# Patient Record
Sex: FEMALE | NOT HISPANIC OR LATINO | Employment: OTHER | URBAN - METROPOLITAN AREA
[De-identification: names, ages, dates, MRNs, and addresses within clinical notes are randomized per-mention and may not be internally consistent; named-entity substitution may affect disease eponyms.]

---

## 2017-01-09 ENCOUNTER — RECORDS - HEALTHEAST (OUTPATIENT)
Dept: BONE DENSITY | Facility: CLINIC | Age: 72
End: 2017-01-09

## 2017-01-09 ENCOUNTER — RECORDS - HEALTHEAST (OUTPATIENT)
Dept: ADMINISTRATIVE | Facility: OTHER | Age: 72
End: 2017-01-09

## 2017-01-09 DIAGNOSIS — Z85.3 PERSONAL HISTORY OF MALIGNANT NEOPLASM OF BREAST: ICD-10-CM

## 2017-01-09 DIAGNOSIS — M85.80 OTHER SPECIFIED DISORDERS OF BONE DENSITY AND STRUCTURE, UNSPECIFIED SITE: ICD-10-CM

## 2017-01-09 DIAGNOSIS — M89.9 DISORDER OF BONE, UNSPECIFIED: ICD-10-CM

## 2017-01-11 ENCOUNTER — RECORDS - HEALTHEAST (OUTPATIENT)
Dept: ADMINISTRATIVE | Facility: OTHER | Age: 72
End: 2017-01-11

## 2017-01-18 ENCOUNTER — COMMUNICATION - HEALTHEAST (OUTPATIENT)
Dept: FAMILY MEDICINE | Facility: CLINIC | Age: 72
End: 2017-01-18

## 2017-01-25 ENCOUNTER — OFFICE VISIT - HEALTHEAST (OUTPATIENT)
Dept: RHEUMATOLOGY | Facility: CLINIC | Age: 72
End: 2017-01-25

## 2017-01-25 DIAGNOSIS — M19.90 OSTEOARTHRITIS: ICD-10-CM

## 2017-01-25 DIAGNOSIS — M85.80 OSTEOPENIA: ICD-10-CM

## 2017-03-02 ENCOUNTER — OFFICE VISIT - HEALTHEAST (OUTPATIENT)
Dept: FAMILY MEDICINE | Facility: CLINIC | Age: 72
End: 2017-03-02

## 2017-03-02 ENCOUNTER — COMMUNICATION - HEALTHEAST (OUTPATIENT)
Dept: TELEHEALTH | Facility: CLINIC | Age: 72
End: 2017-03-02

## 2017-03-02 DIAGNOSIS — S83.207A ACUTE MENISCAL TEAR OF LEFT KNEE, INITIAL ENCOUNTER: ICD-10-CM

## 2017-03-02 DIAGNOSIS — E87.1 HYPONATREMIA: ICD-10-CM

## 2017-03-02 DIAGNOSIS — Z01.818 PRE-OPERATIVE EXAM: ICD-10-CM

## 2017-03-02 LAB
ATRIAL RATE - MUSE: 67 BPM
DIASTOLIC BLOOD PRESSURE - MUSE: NORMAL MMHG
INTERPRETATION ECG - MUSE: NORMAL
P AXIS - MUSE: 74 DEGREES
PR INTERVAL - MUSE: 144 MS
QRS DURATION - MUSE: 70 MS
QT - MUSE: 388 MS
QTC - MUSE: 409 MS
R AXIS - MUSE: 82 DEGREES
SYSTOLIC BLOOD PRESSURE - MUSE: NORMAL MMHG
T AXIS - MUSE: 73 DEGREES
VENTRICULAR RATE- MUSE: 67 BPM

## 2017-03-02 ASSESSMENT — MIFFLIN-ST. JEOR: SCORE: 1113.42

## 2017-03-03 ENCOUNTER — RECORDS - HEALTHEAST (OUTPATIENT)
Dept: ADMINISTRATIVE | Facility: OTHER | Age: 72
End: 2017-03-03

## 2017-03-07 ENCOUNTER — RECORDS - HEALTHEAST (OUTPATIENT)
Dept: ADMINISTRATIVE | Facility: OTHER | Age: 72
End: 2017-03-07

## 2017-03-09 ENCOUNTER — COMMUNICATION - HEALTHEAST (OUTPATIENT)
Dept: FAMILY MEDICINE | Facility: CLINIC | Age: 72
End: 2017-03-09

## 2017-04-03 ENCOUNTER — COMMUNICATION - HEALTHEAST (OUTPATIENT)
Dept: ONCOLOGY | Facility: CLINIC | Age: 72
End: 2017-04-03

## 2017-04-03 ENCOUNTER — OFFICE VISIT - HEALTHEAST (OUTPATIENT)
Dept: FAMILY MEDICINE | Facility: CLINIC | Age: 72
End: 2017-04-03

## 2017-04-03 DIAGNOSIS — R61 UNEXPLAINED NIGHT SWEATS: ICD-10-CM

## 2017-04-03 DIAGNOSIS — E87.1 HYPONATREMIA: ICD-10-CM

## 2017-05-18 ENCOUNTER — AMBULATORY - HEALTHEAST (OUTPATIENT)
Dept: LAB | Facility: CLINIC | Age: 72
End: 2017-05-18

## 2017-05-18 DIAGNOSIS — E87.1 HYPONATREMIA: ICD-10-CM

## 2017-05-23 ENCOUNTER — COMMUNICATION - HEALTHEAST (OUTPATIENT)
Dept: FAMILY MEDICINE | Facility: CLINIC | Age: 72
End: 2017-05-23

## 2017-05-30 ENCOUNTER — OFFICE VISIT - HEALTHEAST (OUTPATIENT)
Dept: FAMILY MEDICINE | Facility: CLINIC | Age: 72
End: 2017-05-30

## 2017-05-30 DIAGNOSIS — L50.9 FULL BODY HIVES: ICD-10-CM

## 2017-06-07 ENCOUNTER — OFFICE VISIT - HEALTHEAST (OUTPATIENT)
Dept: ONCOLOGY | Facility: CLINIC | Age: 72
End: 2017-06-07

## 2017-06-07 DIAGNOSIS — C50.919 BREAST CANCER (H): ICD-10-CM

## 2017-06-07 ASSESSMENT — MIFFLIN-ST. JEOR: SCORE: 1085.75

## 2017-06-08 LAB — BREAST CARCINOMA ASSOC AG(CA 27.29) - HISTORICAL: 34.1 U/ML

## 2017-06-09 ENCOUNTER — COMMUNICATION - HEALTHEAST (OUTPATIENT)
Dept: ONCOLOGY | Facility: CLINIC | Age: 72
End: 2017-06-09

## 2017-06-15 ENCOUNTER — COMMUNICATION - HEALTHEAST (OUTPATIENT)
Dept: ONCOLOGY | Facility: CLINIC | Age: 72
End: 2017-06-15

## 2017-10-12 ENCOUNTER — AMBULATORY - HEALTHEAST (OUTPATIENT)
Dept: NURSING | Facility: CLINIC | Age: 72
End: 2017-10-12

## 2017-10-12 ENCOUNTER — COMMUNICATION - HEALTHEAST (OUTPATIENT)
Dept: ONCOLOGY | Facility: CLINIC | Age: 72
End: 2017-10-12

## 2017-10-12 DIAGNOSIS — Z23 NEED FOR IMMUNIZATION AGAINST INFLUENZA: ICD-10-CM

## 2017-10-20 ENCOUNTER — RECORDS - HEALTHEAST (OUTPATIENT)
Dept: ADMINISTRATIVE | Facility: OTHER | Age: 72
End: 2017-10-20

## 2017-10-26 ENCOUNTER — OFFICE VISIT - HEALTHEAST (OUTPATIENT)
Dept: RHEUMATOLOGY | Facility: CLINIC | Age: 72
End: 2017-10-26

## 2017-10-26 DIAGNOSIS — M13.0 POLYARTHRITIS OF HAND: ICD-10-CM

## 2017-10-26 ASSESSMENT — MIFFLIN-ST. JEOR: SCORE: 1085.75

## 2017-11-20 ENCOUNTER — OFFICE VISIT - HEALTHEAST (OUTPATIENT)
Dept: FAMILY MEDICINE | Facility: CLINIC | Age: 72
End: 2017-11-20

## 2017-11-20 DIAGNOSIS — R30.0 DYSURIA: ICD-10-CM

## 2017-11-20 NOTE — ASSESSMENT & PLAN NOTE
Patient is describing dysuria and urgency to void.  She is status post hysterectomy approximately 20+ years ago.  She also had some type of bladder sling procedure.  Symptoms are new in the past3 weeks.  She had an expected postsurgical physical exam.  -Urinalysis today not showing infection   -Recommended attention to bowel function with judicious use of stool softening agents to achieve more regular bowel movements.  -If not improving with these measures, consider referral to gynecology to discuss urodynamic testing.

## 2018-03-16 ENCOUNTER — RECORDS - HEALTHEAST (OUTPATIENT)
Dept: ADMINISTRATIVE | Facility: OTHER | Age: 73
End: 2018-03-16

## 2018-04-09 ENCOUNTER — COMMUNICATION - HEALTHEAST (OUTPATIENT)
Dept: FAMILY MEDICINE | Facility: CLINIC | Age: 73
End: 2018-04-09

## 2018-04-09 DIAGNOSIS — M25.569 KNEE PAIN: ICD-10-CM

## 2018-05-09 ENCOUNTER — RECORDS - HEALTHEAST (OUTPATIENT)
Dept: ADMINISTRATIVE | Facility: OTHER | Age: 73
End: 2018-05-09

## 2018-06-15 ENCOUNTER — OFFICE VISIT - HEALTHEAST (OUTPATIENT)
Dept: FAMILY MEDICINE | Facility: CLINIC | Age: 73
End: 2018-06-15

## 2018-06-15 DIAGNOSIS — Z01.818 PRE-OPERATIVE EXAMINATION: ICD-10-CM

## 2018-06-15 DIAGNOSIS — M17.12 PRIMARY OSTEOARTHRITIS OF LEFT KNEE: ICD-10-CM

## 2018-06-15 DIAGNOSIS — Z12.11 SCREEN FOR COLON CANCER: ICD-10-CM

## 2018-06-15 LAB
ANION GAP SERPL CALCULATED.3IONS-SCNC: 12 MMOL/L (ref 5–18)
BUN SERPL-MCNC: 13 MG/DL (ref 8–28)
CALCIUM SERPL-MCNC: 9.8 MG/DL (ref 8.5–10.5)
CHLORIDE BLD-SCNC: 98 MMOL/L (ref 98–107)
CO2 SERPL-SCNC: 24 MMOL/L (ref 22–31)
CREAT SERPL-MCNC: 0.59 MG/DL (ref 0.6–1.1)
ERYTHROCYTE [DISTWIDTH] IN BLOOD BY AUTOMATED COUNT: 11.2 % (ref 11–14.5)
GFR SERPL CREATININE-BSD FRML MDRD: >60 ML/MIN/1.73M2
GLUCOSE BLD-MCNC: 86 MG/DL (ref 70–125)
HCT VFR BLD AUTO: 38.3 % (ref 35–47)
HGB BLD-MCNC: 13.2 G/DL (ref 12–16)
MCH RBC QN AUTO: 31.8 PG (ref 27–34)
MCHC RBC AUTO-ENTMCNC: 34.5 G/DL (ref 32–36)
MCV RBC AUTO: 92 FL (ref 80–100)
PLATELET # BLD AUTO: 187 THOU/UL (ref 140–440)
PMV BLD AUTO: 6.4 FL (ref 7–10)
POTASSIUM BLD-SCNC: 4.3 MMOL/L (ref 3.5–5)
RBC # BLD AUTO: 4.15 MILL/UL (ref 3.8–5.4)
SODIUM SERPL-SCNC: 134 MMOL/L (ref 136–145)
WBC: 5 THOU/UL (ref 4–11)

## 2018-06-15 ASSESSMENT — MIFFLIN-ST. JEOR: SCORE: 1078.95

## 2018-06-15 NOTE — ASSESSMENT & PLAN NOTE
Reviewed the documentation/thought process leading up to the decision to pursue total left knee arthroplasty.  I discussed this with the patient and she is clear and her desire to have the surgery in order to improve the quality of her life and her ability to walk.  There is no contra indication to surgery.  We discussed that she will be anticoagulated per Dr. Soria' recommendations in his note.  She is comfortable with these recommendations.  -Check laboratory tests.  History of hyponatremia which is been stable and previously worked up.  Anticipate low normal sodium or slightly decreased sodium.  Check hemoglobin.  -The patient would like to continue follow-up with rheumatology.  We discussed follow-up plans.

## 2018-06-18 ENCOUNTER — COMMUNICATION - HEALTHEAST (OUTPATIENT)
Dept: FAMILY MEDICINE | Facility: CLINIC | Age: 73
End: 2018-06-18

## 2018-06-21 LAB
ATRIAL RATE - MUSE: 67 BPM
DIASTOLIC BLOOD PRESSURE - MUSE: NORMAL MMHG
INTERPRETATION ECG - MUSE: NORMAL
P AXIS - MUSE: 62 DEGREES
PR INTERVAL - MUSE: 146 MS
QRS DURATION - MUSE: 64 MS
QT - MUSE: 396 MS
QTC - MUSE: 418 MS
R AXIS - MUSE: 76 DEGREES
SYSTOLIC BLOOD PRESSURE - MUSE: NORMAL MMHG
T AXIS - MUSE: 77 DEGREES
VENTRICULAR RATE- MUSE: 67 BPM

## 2018-07-05 ENCOUNTER — RECORDS - HEALTHEAST (OUTPATIENT)
Dept: ADMINISTRATIVE | Facility: OTHER | Age: 73
End: 2018-07-05

## 2018-07-09 ENCOUNTER — RECORDS - HEALTHEAST (OUTPATIENT)
Dept: ADMINISTRATIVE | Facility: OTHER | Age: 73
End: 2018-07-09

## 2018-07-11 ENCOUNTER — RECORDS - HEALTHEAST (OUTPATIENT)
Dept: LAB | Facility: CLINIC | Age: 73
End: 2018-07-11

## 2018-07-11 ENCOUNTER — RECORDS - HEALTHEAST (OUTPATIENT)
Dept: ADMINISTRATIVE | Facility: OTHER | Age: 73
End: 2018-07-11

## 2018-07-11 LAB
ALP SERPL-CCNC: 104 U/L (ref 45–120)
CALCIUM SERPL-MCNC: 9.7 MG/DL (ref 8.5–10.5)
PTH-INTACT SERPL-MCNC: 35 PG/ML (ref 10–86)

## 2018-07-12 LAB — 25(OH)D3 SERPL-MCNC: 40.1 NG/ML (ref 30–80)

## 2018-07-24 ENCOUNTER — OFFICE VISIT - HEALTHEAST (OUTPATIENT)
Dept: ONCOLOGY | Facility: CLINIC | Age: 73
End: 2018-07-24

## 2018-07-24 ENCOUNTER — AMBULATORY - HEALTHEAST (OUTPATIENT)
Dept: INFUSION THERAPY | Facility: CLINIC | Age: 73
End: 2018-07-24

## 2018-07-24 DIAGNOSIS — C50.919 BREAST CANCER (H): ICD-10-CM

## 2018-07-24 LAB
ALBUMIN SERPL-MCNC: 3.8 G/DL (ref 3.5–5)
ALP SERPL-CCNC: 101 U/L (ref 45–120)
ALT SERPL W P-5'-P-CCNC: 82 U/L (ref 0–45)
ANION GAP SERPL CALCULATED.3IONS-SCNC: 9 MMOL/L (ref 5–18)
AST SERPL W P-5'-P-CCNC: 51 U/L (ref 0–40)
BASOPHILS # BLD AUTO: 0 THOU/UL (ref 0–0.2)
BASOPHILS NFR BLD AUTO: 1 % (ref 0–2)
BILIRUB SERPL-MCNC: 0.3 MG/DL (ref 0–1)
BUN SERPL-MCNC: 12 MG/DL (ref 8–28)
CALCIUM SERPL-MCNC: 9.5 MG/DL (ref 8.5–10.5)
CHLORIDE BLD-SCNC: 96 MMOL/L (ref 98–107)
CO2 SERPL-SCNC: 26 MMOL/L (ref 22–31)
CREAT SERPL-MCNC: 0.61 MG/DL (ref 0.6–1.1)
EOSINOPHIL # BLD AUTO: 0.3 THOU/UL (ref 0–0.4)
EOSINOPHIL NFR BLD AUTO: 6 % (ref 0–6)
ERYTHROCYTE [DISTWIDTH] IN BLOOD BY AUTOMATED COUNT: 12.8 % (ref 11–14.5)
GFR SERPL CREATININE-BSD FRML MDRD: >60 ML/MIN/1.73M2
GLUCOSE BLD-MCNC: 125 MG/DL (ref 70–125)
HCT VFR BLD AUTO: 31.2 % (ref 35–47)
HGB BLD-MCNC: 11.1 G/DL (ref 12–16)
LYMPHOCYTES # BLD AUTO: 1 THOU/UL (ref 0.8–4.4)
LYMPHOCYTES NFR BLD AUTO: 20 % (ref 20–40)
MCH RBC QN AUTO: 30.8 PG (ref 27–34)
MCHC RBC AUTO-ENTMCNC: 35.6 G/DL (ref 32–36)
MCV RBC AUTO: 87 FL (ref 80–100)
MONOCYTES # BLD AUTO: 0.5 THOU/UL (ref 0–0.9)
MONOCYTES NFR BLD AUTO: 10 % (ref 2–10)
NEUTROPHILS # BLD AUTO: 3.1 THOU/UL (ref 2–7.7)
NEUTROPHILS NFR BLD AUTO: 64 % (ref 50–70)
PLATELET # BLD AUTO: 224 THOU/UL (ref 140–440)
PMV BLD AUTO: 8.7 FL (ref 8.5–12.5)
POTASSIUM BLD-SCNC: 4.1 MMOL/L (ref 3.5–5)
PROT SERPL-MCNC: 7.3 G/DL (ref 6–8)
RBC # BLD AUTO: 3.6 MILL/UL (ref 3.8–5.4)
SODIUM SERPL-SCNC: 131 MMOL/L (ref 136–145)
WBC: 4.9 THOU/UL (ref 4–11)

## 2018-07-25 LAB — CANCER AG27-29 SERPL-ACNC: 49 U/ML (ref 0–39)

## 2018-07-26 ENCOUNTER — AMBULATORY - HEALTHEAST (OUTPATIENT)
Dept: ONCOLOGY | Facility: CLINIC | Age: 73
End: 2018-07-26

## 2018-07-26 DIAGNOSIS — Z85.3 HISTORY OF BREAST CANCER: ICD-10-CM

## 2018-08-03 ENCOUNTER — COMMUNICATION - HEALTHEAST (OUTPATIENT)
Dept: ONCOLOGY | Facility: CLINIC | Age: 73
End: 2018-08-03

## 2018-08-03 ENCOUNTER — AMBULATORY - HEALTHEAST (OUTPATIENT)
Dept: ONCOLOGY | Facility: CLINIC | Age: 73
End: 2018-08-03

## 2018-08-08 ENCOUNTER — HOSPITAL ENCOUNTER (OUTPATIENT)
Dept: CT IMAGING | Facility: CLINIC | Age: 73
Discharge: HOME | End: 2018-08-08
Attending: INTERNAL MEDICINE

## 2018-08-08 DIAGNOSIS — Z85.3 HISTORY OF BREAST CANCER: ICD-10-CM

## 2018-08-09 ENCOUNTER — OFFICE VISIT - HEALTHEAST (OUTPATIENT)
Dept: ONCOLOGY | Facility: CLINIC | Age: 73
End: 2018-08-09

## 2018-08-09 DIAGNOSIS — R59.1 LYMPHADENOPATHY: ICD-10-CM

## 2018-08-09 DIAGNOSIS — Z85.3 HISTORY OF BREAST CANCER: ICD-10-CM

## 2018-08-09 DIAGNOSIS — R79.89 ELEVATED LFTS: ICD-10-CM

## 2018-08-09 ASSESSMENT — MIFFLIN-ST. JEOR: SCORE: 1080.76

## 2018-08-13 ENCOUNTER — RECORDS - HEALTHEAST (OUTPATIENT)
Dept: ADMINISTRATIVE | Facility: OTHER | Age: 73
End: 2018-08-13

## 2018-09-04 ENCOUNTER — COMMUNICATION - HEALTHEAST (OUTPATIENT)
Dept: FAMILY MEDICINE | Facility: CLINIC | Age: 73
End: 2018-09-04

## 2018-09-10 ENCOUNTER — RECORDS - HEALTHEAST (OUTPATIENT)
Dept: ADMINISTRATIVE | Facility: OTHER | Age: 73
End: 2018-09-10

## 2018-09-11 ENCOUNTER — RECORDS - HEALTHEAST (OUTPATIENT)
Dept: ADMINISTRATIVE | Facility: OTHER | Age: 73
End: 2018-09-11

## 2018-09-17 ENCOUNTER — RECORDS - HEALTHEAST (OUTPATIENT)
Dept: ADMINISTRATIVE | Facility: OTHER | Age: 73
End: 2018-09-17

## 2019-02-06 ENCOUNTER — OFFICE VISIT - HEALTHEAST (OUTPATIENT)
Dept: ONCOLOGY | Facility: CLINIC | Age: 74
End: 2019-02-06

## 2019-02-06 ENCOUNTER — AMBULATORY - HEALTHEAST (OUTPATIENT)
Dept: INFUSION THERAPY | Facility: CLINIC | Age: 74
End: 2019-02-06

## 2019-02-06 DIAGNOSIS — R59.1 LYMPHADENOPATHY: ICD-10-CM

## 2019-02-06 DIAGNOSIS — Z85.3 HISTORY OF BREAST CANCER: ICD-10-CM

## 2019-02-06 LAB
ALBUMIN SERPL-MCNC: 4.2 G/DL (ref 3.5–5)
ALP SERPL-CCNC: 67 U/L (ref 45–120)
ALT SERPL W P-5'-P-CCNC: 33 U/L (ref 0–45)
ANION GAP SERPL CALCULATED.3IONS-SCNC: 13 MMOL/L (ref 5–18)
AST SERPL W P-5'-P-CCNC: 35 U/L (ref 0–40)
BASOPHILS # BLD AUTO: 0 THOU/UL (ref 0–0.2)
BASOPHILS NFR BLD AUTO: 0 % (ref 0–2)
BILIRUB SERPL-MCNC: 0.6 MG/DL (ref 0–1)
BUN SERPL-MCNC: 14 MG/DL (ref 8–28)
CALCIUM SERPL-MCNC: 9.4 MG/DL (ref 8.5–10.5)
CHLORIDE BLD-SCNC: 99 MMOL/L (ref 98–107)
CO2 SERPL-SCNC: 23 MMOL/L (ref 22–31)
CREAT SERPL-MCNC: 0.65 MG/DL (ref 0.6–1.1)
EOSINOPHIL # BLD AUTO: 0 THOU/UL (ref 0–0.4)
EOSINOPHIL NFR BLD AUTO: 0 % (ref 0–6)
ERYTHROCYTE [DISTWIDTH] IN BLOOD BY AUTOMATED COUNT: 11.8 % (ref 11–14.5)
GFR SERPL CREATININE-BSD FRML MDRD: >60 ML/MIN/1.73M2
GLUCOSE BLD-MCNC: 114 MG/DL (ref 70–125)
HCT VFR BLD AUTO: 34.2 % (ref 35–47)
HGB BLD-MCNC: 12.4 G/DL (ref 12–16)
LDH SERPL L TO P-CCNC: 273 U/L (ref 125–220)
LYMPHOCYTES # BLD AUTO: 0.8 THOU/UL (ref 0.8–4.4)
LYMPHOCYTES NFR BLD AUTO: 14 % (ref 20–40)
MCH RBC QN AUTO: 31.2 PG (ref 27–34)
MCHC RBC AUTO-ENTMCNC: 36.3 G/DL (ref 32–36)
MCV RBC AUTO: 86 FL (ref 80–100)
MONOCYTES # BLD AUTO: 0.5 THOU/UL (ref 0–0.9)
MONOCYTES NFR BLD AUTO: 10 % (ref 2–10)
NEUTROPHILS # BLD AUTO: 4.1 THOU/UL (ref 2–7.7)
NEUTROPHILS NFR BLD AUTO: 75 % (ref 50–70)
PLATELET # BLD AUTO: 172 THOU/UL (ref 140–440)
PMV BLD AUTO: 8.8 FL (ref 8.5–12.5)
POTASSIUM BLD-SCNC: 3.9 MMOL/L (ref 3.5–5)
PROT SERPL-MCNC: 7.5 G/DL (ref 6–8)
RBC # BLD AUTO: 3.97 MILL/UL (ref 3.8–5.4)
SODIUM SERPL-SCNC: 135 MMOL/L (ref 136–145)
WBC: 5.4 THOU/UL (ref 4–11)

## 2019-02-07 LAB — CANCER AG27-29 SERPL-ACNC: 31 U/ML (ref 0–39)

## 2019-02-13 ENCOUNTER — RECORDS - HEALTHEAST (OUTPATIENT)
Dept: ADMINISTRATIVE | Facility: OTHER | Age: 74
End: 2019-02-13

## 2019-06-19 ENCOUNTER — RECORDS - HEALTHEAST (OUTPATIENT)
Dept: ADMINISTRATIVE | Facility: OTHER | Age: 74
End: 2019-06-19

## 2019-10-16 ENCOUNTER — OFFICE VISIT - HEALTHEAST (OUTPATIENT)
Dept: ONCOLOGY | Facility: CLINIC | Age: 74
End: 2019-10-16

## 2019-10-16 DIAGNOSIS — R59.1 LYMPHADENOPATHY: ICD-10-CM

## 2019-10-16 DIAGNOSIS — Z85.3 HISTORY OF BREAST CANCER: ICD-10-CM

## 2019-10-16 DIAGNOSIS — C50.812 MALIGNANT NEOPLASM OF OVERLAPPING SITES OF LEFT BREAST IN FEMALE, ESTROGEN RECEPTOR POSITIVE (H): ICD-10-CM

## 2019-10-16 DIAGNOSIS — Z17.0 MALIGNANT NEOPLASM OF OVERLAPPING SITES OF LEFT BREAST IN FEMALE, ESTROGEN RECEPTOR POSITIVE (H): ICD-10-CM

## 2020-07-21 ENCOUNTER — COMMUNICATION - HEALTHEAST (OUTPATIENT)
Dept: ADMINISTRATIVE | Facility: HOSPITAL | Age: 75
End: 2020-07-21

## 2020-08-06 ENCOUNTER — COMMUNICATION - HEALTHEAST (OUTPATIENT)
Dept: ADMINISTRATIVE | Facility: HOSPITAL | Age: 75
End: 2020-08-06

## 2020-08-27 ENCOUNTER — RECORDS - HEALTHEAST (OUTPATIENT)
Dept: ADMINISTRATIVE | Facility: OTHER | Age: 75
End: 2020-08-27

## 2020-10-22 ENCOUNTER — OFFICE VISIT - HEALTHEAST (OUTPATIENT)
Dept: ONCOLOGY | Facility: CLINIC | Age: 75
End: 2020-10-22

## 2020-10-22 DIAGNOSIS — C50.812 MALIGNANT NEOPLASM OF OVERLAPPING SITES OF LEFT BREAST IN FEMALE, ESTROGEN RECEPTOR POSITIVE (H): ICD-10-CM

## 2020-10-22 DIAGNOSIS — Z17.0 MALIGNANT NEOPLASM OF OVERLAPPING SITES OF LEFT BREAST IN FEMALE, ESTROGEN RECEPTOR POSITIVE (H): ICD-10-CM

## 2020-10-29 ENCOUNTER — OFFICE VISIT - HEALTHEAST (OUTPATIENT)
Dept: FAMILY MEDICINE | Facility: CLINIC | Age: 75
End: 2020-10-29

## 2020-10-29 DIAGNOSIS — Z00.00 ROUTINE GENERAL MEDICAL EXAMINATION AT A HEALTH CARE FACILITY: ICD-10-CM

## 2020-10-29 DIAGNOSIS — I10 BENIGN ESSENTIAL HYPERTENSION: ICD-10-CM

## 2020-10-29 DIAGNOSIS — R73.01 ELEVATED FASTING GLUCOSE: ICD-10-CM

## 2020-10-29 DIAGNOSIS — Z85.3 HISTORY OF BREAST CANCER: ICD-10-CM

## 2020-10-29 DIAGNOSIS — E78.00 PURE HYPERCHOLESTEROLEMIA: ICD-10-CM

## 2020-10-29 DIAGNOSIS — M85.80 OSTEOPENIA, UNSPECIFIED LOCATION: ICD-10-CM

## 2020-10-29 LAB
ALT SERPL W P-5'-P-CCNC: 46 U/L (ref 0–45)
ANION GAP SERPL CALCULATED.3IONS-SCNC: 12 MMOL/L (ref 5–18)
BUN SERPL-MCNC: 15 MG/DL (ref 8–28)
CALCIUM SERPL-MCNC: 9.6 MG/DL (ref 8.5–10.5)
CHLORIDE BLD-SCNC: 99 MMOL/L (ref 98–107)
CO2 SERPL-SCNC: 24 MMOL/L (ref 22–31)
CREAT SERPL-MCNC: 0.66 MG/DL (ref 0.6–1.1)
ERYTHROCYTE [DISTWIDTH] IN BLOOD BY AUTOMATED COUNT: 11.8 % (ref 11–14.5)
GFR SERPL CREATININE-BSD FRML MDRD: >60 ML/MIN/1.73M2
GLUCOSE BLD-MCNC: 95 MG/DL (ref 70–125)
HCT VFR BLD AUTO: 41.4 % (ref 35–47)
HGB BLD-MCNC: 14.1 G/DL (ref 12–16)
MCH RBC QN AUTO: 32.3 PG (ref 27–34)
MCHC RBC AUTO-ENTMCNC: 34.1 G/DL (ref 32–36)
MCV RBC AUTO: 95 FL (ref 80–100)
PLATELET # BLD AUTO: 194 THOU/UL (ref 140–440)
PMV BLD AUTO: 6.5 FL (ref 7–10)
POTASSIUM BLD-SCNC: 4 MMOL/L (ref 3.5–5)
RBC # BLD AUTO: 4.37 MILL/UL (ref 3.8–5.4)
SODIUM SERPL-SCNC: 135 MMOL/L (ref 136–145)
WBC: 4.8 THOU/UL (ref 4–11)

## 2020-10-29 ASSESSMENT — MIFFLIN-ST. JEOR: SCORE: 1123.18

## 2020-10-29 NOTE — ASSESSMENT & PLAN NOTE
Patient presents for annual exam.  She says that much of her emotional energy over the past year has been focused on supporting her  who is undergoing Parkinson's disease and has experienced a decline of overall function.  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  She recently saw her oncologist who believes that she is unlikely to have recurrence of breast cancer.  Blood pressure concerns as discussed elsewhere.  She takes a number of supplements.  We will discontinue checking cholesterol as the patient states a strong preferenceto not to be on a cholesterol-lowering medication.  Osteopenia as discussed elsewhere.

## 2020-10-29 NOTE — ASSESSMENT & PLAN NOTE
Declined treatment for osteopenia last year.  Recheck DEXA in 1-2 years given abnormal and worsening result.

## 2020-10-29 NOTE — ASSESSMENT & PLAN NOTE
This patient has had persistent elevation of blood pressure across clinic visits (2 different health system) over the past couple of years.  She does meet criteria for essential hypertension.  We discussed that treating high blood pressure is in her interest given her family history of stroke in her mother.  She did agree to a prescription of lisinopril.  10 mg was prescribed.  She will check her blood pressure at home frequently and report back whether or not her body is responding.  She was resistant to my suggestion that she return to clinic for recheck.  However,given the severity of elevation I think it is reasonable start this medication.  Consider BMP recheck.

## 2020-10-30 ENCOUNTER — COMMUNICATION - HEALTHEAST (OUTPATIENT)
Dept: FAMILY MEDICINE | Facility: CLINIC | Age: 75
End: 2020-10-30

## 2020-10-30 LAB — HBA1C MFR BLD: 5.1 %

## 2020-12-03 ENCOUNTER — AMBULATORY - HEALTHEAST (OUTPATIENT)
Dept: FAMILY MEDICINE | Facility: CLINIC | Age: 75
End: 2020-12-03

## 2020-12-03 DIAGNOSIS — I10 BENIGN ESSENTIAL HYPERTENSION: ICD-10-CM

## 2020-12-03 DIAGNOSIS — L98.9 SKIN LESION: ICD-10-CM

## 2020-12-03 LAB
ANION GAP SERPL CALCULATED.3IONS-SCNC: 13 MMOL/L (ref 5–18)
BUN SERPL-MCNC: 14 MG/DL (ref 8–28)
CALCIUM SERPL-MCNC: 9.3 MG/DL (ref 8.5–10.5)
CHLORIDE BLD-SCNC: 95 MMOL/L (ref 98–107)
CO2 SERPL-SCNC: 24 MMOL/L (ref 22–31)
CREAT SERPL-MCNC: 0.73 MG/DL (ref 0.6–1.1)
GFR SERPL CREATININE-BSD FRML MDRD: >60 ML/MIN/1.73M2
GLUCOSE BLD-MCNC: 123 MG/DL (ref 70–125)
POTASSIUM BLD-SCNC: 3.8 MMOL/L (ref 3.5–5)
SODIUM SERPL-SCNC: 132 MMOL/L (ref 136–145)

## 2020-12-03 NOTE — ASSESSMENT & PLAN NOTE
Blood pressure improved with today's measurement.  No side effects from lisinopril.  Check basic metabolic panel.  Recheck in 6 monthsrecommended.

## 2020-12-07 LAB
LAB AP CHARGES (HE HISTORICAL CONVERSION): NORMAL
PATH REPORT.COMMENTS IMP SPEC: NORMAL
PATH REPORT.FINAL DX SPEC: NORMAL
PATH REPORT.GROSS SPEC: NORMAL
PATH REPORT.MICROSCOPIC SPEC OTHER STN: NORMAL
PATH REPORT.RELEVANT HX SPEC: NORMAL
RESULT FLAG (HE HISTORICAL CONVERSION): NORMAL

## 2020-12-08 ENCOUNTER — COMMUNICATION - HEALTHEAST (OUTPATIENT)
Dept: FAMILY MEDICINE | Facility: CLINIC | Age: 75
End: 2020-12-08

## 2020-12-08 DIAGNOSIS — E87.1 HYPONATREMIA: ICD-10-CM

## 2021-02-05 ENCOUNTER — RECORDS - HEALTHEAST (OUTPATIENT)
Dept: ADMINISTRATIVE | Facility: OTHER | Age: 76
End: 2021-02-05

## 2021-03-05 ENCOUNTER — RECORDS - HEALTHEAST (OUTPATIENT)
Dept: ADMINISTRATIVE | Facility: OTHER | Age: 76
End: 2021-03-05

## 2021-03-09 ENCOUNTER — OFFICE VISIT - HEALTHEAST (OUTPATIENT)
Dept: FAMILY MEDICINE | Facility: CLINIC | Age: 76
End: 2021-03-09

## 2021-03-09 DIAGNOSIS — I10 BENIGN ESSENTIAL HYPERTENSION: ICD-10-CM

## 2021-03-09 NOTE — ASSESSMENT & PLAN NOTE
This patient's blood pressure measurements over the past month have been elevated.  She is a history of hyponatremia of unclear etiology.  She is tolerated lisinopril well.  No obvious secondary cause of worsening hypertension.  We will simply titrate lisinopril from 10 mg to 20 mg/day.  Check basic metabolic panel.  Nurse visit will be scheduled in a couple of weeks for blood pressure measurement.  I will see her back likely in 1 month when she comes in for a preoperative exam (cataracts).

## 2021-05-13 ENCOUNTER — OFFICE VISIT - HEALTHEAST (OUTPATIENT)
Dept: FAMILY MEDICINE | Facility: CLINIC | Age: 76
End: 2021-05-13

## 2021-05-13 DIAGNOSIS — I10 BENIGN ESSENTIAL HYPERTENSION: ICD-10-CM

## 2021-05-13 DIAGNOSIS — Z01.818 PREOP GENERAL PHYSICAL EXAM: ICD-10-CM

## 2021-05-13 DIAGNOSIS — H26.9 CATARACT OF BOTH EYES, UNSPECIFIED CATARACT TYPE: ICD-10-CM

## 2021-05-13 DIAGNOSIS — C50.812 MALIGNANT NEOPLASM OF OVERLAPPING SITES OF LEFT BREAST IN FEMALE, ESTROGEN RECEPTOR POSITIVE (H): ICD-10-CM

## 2021-05-13 DIAGNOSIS — Z17.0 MALIGNANT NEOPLASM OF OVERLAPPING SITES OF LEFT BREAST IN FEMALE, ESTROGEN RECEPTOR POSITIVE (H): ICD-10-CM

## 2021-05-13 DIAGNOSIS — Z01.818 PRE-OP EXAM: ICD-10-CM

## 2021-05-13 LAB
ANION GAP SERPL CALCULATED.3IONS-SCNC: 10 MMOL/L (ref 5–18)
BUN SERPL-MCNC: 18 MG/DL (ref 8–28)
CALCIUM SERPL-MCNC: 9.3 MG/DL (ref 8.5–10.5)
CHLORIDE BLD-SCNC: 96 MMOL/L (ref 98–107)
CO2 SERPL-SCNC: 27 MMOL/L (ref 22–31)
CREAT SERPL-MCNC: 0.7 MG/DL (ref 0.6–1.1)
GFR SERPL CREATININE-BSD FRML MDRD: >60 ML/MIN/1.73M2
GLUCOSE BLD-MCNC: 104 MG/DL (ref 70–125)
POTASSIUM BLD-SCNC: 5 MMOL/L (ref 3.5–5)
SODIUM SERPL-SCNC: 133 MMOL/L (ref 136–145)

## 2021-05-13 ASSESSMENT — MIFFLIN-ST. JEOR: SCORE: 1110.7

## 2021-05-13 NOTE — ASSESSMENT & PLAN NOTE
No contraindication to surgery.  Able to perform >4 metabolic equivalents.  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

## 2021-05-14 ENCOUNTER — COMMUNICATION - HEALTHEAST (OUTPATIENT)
Dept: FAMILY MEDICINE | Facility: CLINIC | Age: 76
End: 2021-05-14

## 2021-05-14 DIAGNOSIS — I10 BENIGN ESSENTIAL HYPERTENSION: ICD-10-CM

## 2021-05-14 NOTE — ASSESSMENT & PLAN NOTE
Potassium is elevated.  The patient's blood pressure did come down after she was seated and rested for about 1/2-hour.  Given risks of hyperkalemia, I think it is worth while at this time to maintain her current dose of 20 mg.  Prescription was adjusted at the pharmacy.  We will need to contact the patient and let her know of this adjustment from our discussion yesterday.

## 2021-05-27 VITALS
WEIGHT: 143 LBS | TEMPERATURE: 98 F | BODY MASS INDEX: 24.41 KG/M2 | DIASTOLIC BLOOD PRESSURE: 62 MMHG | SYSTOLIC BLOOD PRESSURE: 138 MMHG | HEART RATE: 72 BPM | OXYGEN SATURATION: 97 % | HEIGHT: 64 IN

## 2021-05-30 VITALS — WEIGHT: 134.7 LBS | BODY MASS INDEX: 22.07 KG/M2

## 2021-05-30 VITALS — WEIGHT: 136.6 LBS | BODY MASS INDEX: 21.95 KG/M2 | HEIGHT: 66 IN

## 2021-05-30 VITALS — WEIGHT: 128 LBS | BODY MASS INDEX: 21.63 KG/M2

## 2021-05-31 VITALS — BODY MASS INDEX: 20.97 KG/M2 | WEIGHT: 130.5 LBS | HEIGHT: 66 IN

## 2021-05-31 VITALS — WEIGHT: 130.5 LBS | HEIGHT: 66 IN | BODY MASS INDEX: 20.97 KG/M2

## 2021-05-31 VITALS — WEIGHT: 133.4 LBS | BODY MASS INDEX: 21.86 KG/M2

## 2021-05-31 VITALS — BODY MASS INDEX: 20.81 KG/M2 | WEIGHT: 127 LBS

## 2021-06-01 VITALS — WEIGHT: 129.4 LBS | HEIGHT: 66 IN | BODY MASS INDEX: 20.79 KG/M2

## 2021-06-01 VITALS — WEIGHT: 130 LBS | BODY MASS INDEX: 21.3 KG/M2

## 2021-06-01 VITALS — WEIGHT: 129 LBS | HEIGHT: 66 IN | BODY MASS INDEX: 20.73 KG/M2

## 2021-06-02 VITALS — WEIGHT: 131.9 LBS | BODY MASS INDEX: 21.62 KG/M2

## 2021-06-02 NOTE — PROGRESS NOTES
[facility] Hematology and Oncology Progress Note    Patient: Sharon L Schwab  MRN: [id number]  Date of Service:10/16/2019      Reason for Visit    Chief Complaint   Patient presents with     HE Cancer     History of breast cancer       Assessment and Plan  Cancer Staging  No matching staging information was found for the patient.    ECOG Performance   ECOG Performance Status: 0     Distress Assessment  Distress Assessment Score: No distress    Pain  Currently in Pain: No/denies    #.  Several upper normal size lymphadenopathy above and below the diaphragm.  Incidentally found in August 2018.   Possibly reactive or low-grade lymphoproliferative neoplasm.  Currently asymptomatic.     Patient would like to hold off any further evaluation or repeat imaging at this point and I agree with that.    Continue clinical exam once a year and sooner with any concern.     #.  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  #.  Left breast invasive lobular carcinoma, stage I, ER positive in 2002.  Post left breast mastectomy and axillary lymph node dissection.  No adjuvant radiation or chemotherapy.  Completed 5 years of tamoxifen.  #.  Low bone density      Clinically well without evidence of breast cancer recurrence.  Examination was unremarkable.  No need for mammogram or imaging at this point.  Continue clinical exam once a year.  Call me sooner with any concern.      #.  Chronic arthritis  . She is using CBD while and which has been helping her a lot.    Problem List    1. Lymphadenopathy     2. History of breast cancer        ______________________________________________________________________________  Diagnosis  1989- diagnosed with DCIS in the right breast by screening mammogram.  2002-left breast invasive lobular carcinoma, early stage.  No lymph no involvement.    Treatment to date  1989-right radical mastectomy and axillary lymph node dissection.  2002-left breast radical  mastectomy and axillary lymph node dissection.  No adjuvant chemotherapy or radiation.  Completed 5 years of tamoxifen.  2008-hysterectomy and bilateral oophorectomy.    History of Present Illness    Eva present herself today for ongoing follow-up.  She is doing well overall.  No new concern.  She has ongoing arthritis, but she found CBD while has been really helpful for her.  Denies any fever, drenching night sweats.  No other lumps or bumps.  Appetite is good.  No new bone pain.     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)  Hot flashes/Night Sweats: Concerns  Neurosensory  Neurosensory (WDL): All neurosensory elements are within defined limits  Eye   Eye Disorder (WDL): All eye disorder elements are within defined limits  Ear  Ear Disorder (WDL): All ear disorder elements are within defined limits  Cardiovascular  Cardiovascular (WDL): All cardiovascular elements are within defined limits  Pulmonary  Respiratory (WDL): Within Defined Limits  Gastrointestinal  Gastrointestinal (WDL): Exceptions to WDL  Constipation: Concerns(prune juice prn)  Genitourinary  Genitourinary (WDL): All genitourinary elements are within defined limits  Lymphatic  Lymph (WDL): All lymph disorder elements are within defined limits  Musculoskeletal and Connective Tissue  Musculoskeletal and Connetive Tissue Disorders (WDL): Exceptions to WDL  Arthralgia: Concerns(bilateral hands and knees)  Integumentary  Integumentary (WDL): All integumentary elements are within defined limits  Patient Coping  Patient Coping: Accepting  Accompanied by  Accompanied by: Alone  Oral Chemo Adherence       Past History  Past Medical History:   Diagnosis Date     Breast CA (H)        Physical Exam    Recent Vitals 10/16/2019   Height -   Weight 131 lbs 6 oz   BSA (m2) 1.66 m2   /85   Pulse 70   Temp 98.1   Temp src 1   SpO2  99   Some recent data might be hidden     General: alert, awake, not in acute distress  HEENT: Head: Normal, normocephalic, atraumatic.  Eye: Normal external eye, conjunctiva, lids cornea, MYRON.  Ears:  Non-tender.  Nose: Normal external nose, mucus membranes and septum.  Pharynx: Dental Hygiene adequate. Normal buccal mucosa. Normal pharynx.  Neck / Thyroid: Supple, no masses, nodes, nodules or enlargement.  Lymphatics: No abnormally enlarged lymph nodes.  Chest: Normal chest wall and respirations. Clear to auscultation.  Breasts: Bilateral mastectomy with post bilateral implants.  No palpable skin nodules or abnormalities seen.  Heart: S1 S2 RRR, no murmur.   Abdomen: abdomen is soft without significant tenderness, masses, organomegaly or guarding  Extremities: normal strength, tone, and muscle mass  Skin: normal. no rash or abnormalities  CNS: non focal.      Lab Results    No results found for this or any previous visit (from the past 168 hour(s)).    Imaging    No results found.    Signed by: August Arvizu MD

## 2021-06-03 VITALS
WEIGHT: 131.4 LBS | HEART RATE: 70 BPM | SYSTOLIC BLOOD PRESSURE: 183 MMHG | DIASTOLIC BLOOD PRESSURE: 85 MMHG | BODY MASS INDEX: 21.53 KG/M2 | TEMPERATURE: 98.1 F | OXYGEN SATURATION: 99 %

## 2021-06-05 VITALS
WEIGHT: 144 LBS | OXYGEN SATURATION: 99 % | DIASTOLIC BLOOD PRESSURE: 80 MMHG | RESPIRATION RATE: 16 BRPM | BODY MASS INDEX: 24.72 KG/M2 | TEMPERATURE: 98.2 F | HEART RATE: 68 BPM | SYSTOLIC BLOOD PRESSURE: 152 MMHG

## 2021-06-05 VITALS
RESPIRATION RATE: 16 BRPM | HEART RATE: 80 BPM | BODY MASS INDEX: 24.37 KG/M2 | DIASTOLIC BLOOD PRESSURE: 74 MMHG | SYSTOLIC BLOOD PRESSURE: 126 MMHG | TEMPERATURE: 98.1 F | WEIGHT: 142 LBS | OXYGEN SATURATION: 98 %

## 2021-06-05 VITALS
DIASTOLIC BLOOD PRESSURE: 90 MMHG | BODY MASS INDEX: 24.59 KG/M2 | SYSTOLIC BLOOD PRESSURE: 160 MMHG | HEART RATE: 74 BPM | OXYGEN SATURATION: 98 % | RESPIRATION RATE: 20 BRPM | TEMPERATURE: 98.2 F | HEIGHT: 64 IN | WEIGHT: 144 LBS

## 2021-06-05 VITALS
WEIGHT: 142.8 LBS | SYSTOLIC BLOOD PRESSURE: 216 MMHG | OXYGEN SATURATION: 98 % | DIASTOLIC BLOOD PRESSURE: 99 MMHG | BODY MASS INDEX: 23.4 KG/M2 | TEMPERATURE: 98.3 F | HEART RATE: 80 BPM

## 2021-06-08 NOTE — PROGRESS NOTES
ASSESSMENT/PLAN:     Eva Shepard is a 71 y.o. female patient with history of   1. Osteoarthritis    2. Osteopenia     who presents for a new patient visit.    Patient with a history of osteoarthritis affecting primarily the hands and the left knee.  Within the last month she had left knee Hyalgan injection and corticosteroid injection which she states did not significantly help.  The joint pain is exacerbated with cold weather.  She does take ibuprofen liquid gelcaps which help the pain.  She has been tolerating this medication without any issues.  Of note she had seen a rheumatologist in [place] prior to moving here 4 months ago who had placed her on hydroxychloroquine presumably for osteoarthritis.  She was also given prednisone when necessary for joint pain flares.  She states the prednisone does not help during any flareup of her pain and she is unsure hydroxychloroquine has been helping.  She has no signs or symptoms of systemic lupus or rheumatoid arthritis.  At this point given no evidence of inflammatory arthritis have recommended that she stop taking prednisone even as a when necessary basis.  She should take NSAIDs as needed for her joint pains given that these have proven beneficial.  We'll check renal function panel today to ensure that her kidney function is stable.  Hydroxychloroquine has been some cases been used for severe erosive arthritis in patients who have not responded to traditional medications.  Given that patient has been responding to NSAIDs and does not have any contraindications including coronary artery disease, history of peptic ulcer disease, significant hypertension, GI bleed, chronic kidney disease- as needed NSAID medication is still a good option for her.  Have recommended that she stop the hydroxychloroquine and assess for any worsening of her joint pains.  If she sees no difference in her joint symptoms while off the hydroxychloroquine, would not restart.  Other potential  "medications for treating osteoarthritis if she does not respond to ibuprofen include meloxicam, Celebrex, duloxetine.  Patient states that she does not need new treatment at this time.  She is to follow-up with the orthopedic surgeon within the next 2 months to consider knee replacement of the left knee.        Assessments and associated orders:   1. Osteoarthritis     2. Osteopenia             Risk and benefits of medications were addressed during this visit for possible future treatment. Handouts and educational materials  were provided to patient.     Return to clinic in 3-6mos for f/u.        SUBJECTIVE:     Eva Shepard is a 71 y.o. female with history of   Patient Active Problem List   Diagnosis     Arthritis     History of breast cancer     History of urinary incontinence     Pure hypercholesterolemia      Osteopenia    here for a new patient visit on  1/25/2017.      Patient recently relocated to [place] from [place].  She was followed by a rheumatologist there who treated her osteoarthritis.  She was started on hydroxychloroquine several years ago, presumably for osteoarthritis.  She does not carry a diagnosis of SLE or RA.  She was also given prednisone to take on an as needed basis, again unclear but presumably for osteoarthritic flare.  Today she states that her joint pain is primarily in the left knee and worse with walking and weightbearing.  In the past, she has had some joint pain in the hands associated with stiffness in the morning for about 10-15 minutes but currently does not have any pain.  She states that since she started drinking a \"seaweed\" juice she noticed a remarkable improvement in the pain in her hands.  She is unsure if he hydroxychloroquine really help with anything and has noted that the prednisone does not help when she takes it for flares.  She has bony enlargement of the DIP and PIPs.  She otherwise denies any ulcers in the mouth or nose, no rashes, no dry eyes or dry mouth, " no Raynaud symptoms, no family history of autoimmune diseases, no history of psoriasis or inflammatory bowel disease, no history of uveitis.  She had a recent DEXA scan done which revealed osteopenia and takes calcium and vitamin D.  She walks regularly and is limited only by her left knee pain.  She was seen by an orthopedic surgeon initially in December 2016 and had Hyalgan injections which did not help with the pain.  She was subsequently seen on January 11, 2017 and had a corticosteroid injection to the left knee which also did not help.  She notices that the weather in [place] definitely makes her joint pains after.    Otherwise, she denies any fevers, chills, shakes, nausea, vomiting, diarrhea, chest pain, shortness of breath, or headache.  No complaints of lymphadenopathy, oral ulcers, rashes, kidney stones or infections.        Review of Systems:  General: No weight loss/weight gain, no fatigue  Eyes: No visual changes or vision loss  Nose: no ulcers in the nose, no nasal discharge, no epistaxis  Ears: no changes in hearing, or hearing loss  Cardiovascular: no chest pain, palpiations  Respiratory: no chronic cough, productive cough, shortness of breath  Gastrointestinal: no abdominal pain, nausea, vomiting, diarrhea, melena, hematochezia  Musculoskeletal: see HPI  Neurological: no seizures, stroke, focal weakness, numbness/tingling  Endocrine: no heat or cold intolerance    Limitation on activities as noted in the MDHAQ scanned in the EMR. Further historical information, including ROS as noted in the multidimensional health assessment questionnaire scanned in the EMR and in the assessment and plan section.    Past Medical History   Diagnosis Date     Breast CA      Past Surgical History   Procedure Laterality Date     Mastectomy       Mastectomy       Hysterectomy       Incontinence surgery       Social History   Substance Use Topics     Smoking status: Never Smoker     Smokeless tobacco: Never Used      Alcohol use Not on file     Family History   Problem Relation Age of Onset     Stroke Mother      Heart disease Brother          Current Outpatient Prescriptions   Medication Sig Dispense Refill     ascorbic acid, vitamin C, (ASCORBIC ACID WITH MANUEL HIPS) 500 MG tablet Take 500 mg by mouth daily.       aspirin 81 mg chewable tablet Chew 81 mg daily.       CALCIUM CITRATE/VITAMIN D3 (CITRACAL + D ORAL) Take 1 tablet by mouth 3 (three) times a day.       cinnamon bark (CINNAMON) 500 mg capsule Take 1,000 mg by mouth daily.       hydroxychloroquine (PLAQUENIL) 200 mg tablet Take 400 mg by mouth daily.       NON FORMULARY lionu juice seaweed 20mg daily       OMEGA 3,6,9 COMBINATION NO.7 ORAL Take 1,000 mg by mouth daily.       omega-3 fatty acids (FISH OIL) 500 mg cap Take 1 tablet by mouth daily.       predniSONE (DELTASONE) 10 MG tablet Take 1 tablet (10 mg total) by mouth daily as needed. 30 tablet 0     PROCYAN OLIG/UBI/VIT A/HB#155 (PYCNOGENOL COMPLEX ORAL) Take 60 mg by mouth daily.       No current facility-administered medications for this visit.        SEE STANDARD SCANNED SHEET FOR MORE DETAILS OF INFORMATION DISCUSSED including expectations sheet.  Reviewed during visit today.     HPI information along with expectation sheet addressed and fully covered as per patient agreement before leaving office visit today.     See staff notes with full reviewed documented details.      OBJECTIVE:  There were no vitals filed for this visit.    General Appearance: Pleasant, alert, appropriate appearance for age. No acute distress  Head Exam: Normal. Normocephalic, atraumatic.  Eye Exam: Normal external eye, conjunctiva, lids, cornea. MYRON.  Ear Exam: Normal TM's bilaterally. Normal auditory canals and external ears.   Nose Exam: Normal external nose, mucus membranes, and septum. No ulcers  OroPharynx Exam: Dental hygiene adequate. Normal buccal mucosa. Normal pharynx. No ulcers.  Neck Exam: Supple, no masses or  nodes.Thyroid Exam: No nodules or enlargement.  Chest/Respiratory Exam: Normal chest wall and respirations. Clear to auscultation.  Cardiovascular Exam: Regular rate and rhythm. S1, S2, no murmur, click, gallop, or rubs.  Gastrointestinal Exam: Soft, non-tender, no masses or organomegaly.  Lymphatic Exam: Non-palpable nodes in neck, clavicular, axillary, or inguinal regions.  Skin: no rash  Neurologic Exam: Nonfocal, normal gross motor and sensory exams, no tremor.    MSK: No synovitis in the hands or the feet.  Heberden's and Sandoval's nodes throughout the bilateral hands.  Full range of motion at the elbows and the shoulders.  No effusions in the right knee, mild effusion in the left knee with soft tissue hypertrophy.  No synovitis in the ankles or the toes.  Full range of motion at the right knee and left knee with slightly limited flexion by about 10 .     No results found for this or any previous visit.  No results found for: WBC, HGB, HCT, MCV, PLT  No results found for: ALT, AST, GGT, ALKPHOS, BILITOT

## 2021-06-09 ENCOUNTER — COMMUNICATION - HEALTHEAST (OUTPATIENT)
Dept: FAMILY MEDICINE | Facility: CLINIC | Age: 76
End: 2021-06-09

## 2021-06-09 DIAGNOSIS — I10 BENIGN ESSENTIAL HYPERTENSION: ICD-10-CM

## 2021-06-09 RX ORDER — LISINOPRIL 20 MG/1
20 TABLET ORAL DAILY
Qty: 90 TABLET | Refills: 1 | Status: SHIPPED | OUTPATIENT
Start: 2021-06-09 | End: 2021-11-17

## 2021-06-09 NOTE — PROGRESS NOTES
Assessment/Plan:    Hyponatremia  71-year-old woman with history of laboratory abnormality consistent with hyponatremia.  No obvious etiology.  Based on her history, it might be some degree of SIADH or primary polydipsia.  This is also in the context of unexplained hot flashes which is been present since treatment for breast cancer.  I suspect these are related to the treatments that she has had for breast cancer.  Given lack of clear explanation and lack of other associated symptoms in intensity/stability I am not recommending an aggressive workup but we will recheck her BMP in 1-2 months time period.  At that time, we will also confirm that she is in normal thyroid hormone.  Serum osmolality will also be obtained.  She does not have any concurrent symptoms of multiple myeloma.   -See EMR for laboratory orders.   -Given that she is describing symptoms of hot flashes, I am recommending that she accelerate her establishment of care with the local oncology group and be seen in the next 1-2 months to discuss confirm that this is expected result of previous cancer therapies.    Mor Chaidez MD  _______________________________    Chief Complaint   Patient presents with     Follow-up     recent blood work      Subjective: Eva Shepard is a 71 y.o. year old female who I have seen in clinic before who presents with the following chronic complaint(s):    hyponatremia:   -The patient has been told that she has diluted her blood prior to previous blood measurements.  She tells me that she consumes massive amounts of water throughout the day.  She has no known liver disease, kidney disease, diabetes.  She feels well, indeed she feels better than she has in quite some time following her recent knee surgery.  She also tells me that she has had problems with low potassiums in the past but not recently.  Hot flashes/night sweats as described below.  She is a cancer survivor.  No back pain.    Hot flashes and night sweats:    "- with \"cycle.\"  Sudden sensation of tiredness and \"wants to rip clothes off.\"  Sleepy.  Has these non-stop but worse towards the end of the months.  Start to finish is ~5 minutes.    - menopause   - had with breast cancer medications.  Has been off a long time.   - will see oncology in October.    - has been on SSRI for hotflash was on therapy at the time     ROS: Complete review of systems obtained.  Pertinent items are listed above.     The following portions of the patient's history were reviewed and updated as appropriate: allergies, current medications, past medical history, past social history and problem list.    Objective:    weight is 134 lb 11.2 oz (61.1 kg). Her oral temperature is 97.9  F (36.6  C). Her blood pressure is 130/60 and her pulse is 76.   General: No acute distress  Psych: Normal affect.      This note has been dictated using voice recognition software. Any grammatical or context distortions are unintentional and inherent to the software  "

## 2021-06-09 NOTE — PROGRESS NOTES
"Assessment/Plan:      Visit for Preoperative Exam.      Had been on steroids for 13 years for \"arthritis.\"  Prednisone had been discontinued.  Consider stress dose steroids.      No contradindication to surgery.  Able to perform >4METS.      Patient approved for surgery with general or local anesthesia. Postoperative Care will be managed by Hospital Service. Labs will be done as indicated. No Cardiology consultation or non-invasive testing. No active cardiac conditions.     Subjective:     Chief Complaint   Patient presents with     Pre-op Exam     Scheduled Procedure: knee scope left   Surgery Date:  03/20/2017  Surgery Location:  [facility]  Surgeon:  Dr. Juan [facility]     Current Outpatient Prescriptions   Medication Sig Dispense Refill     ascorbic acid, vitamin C, (ASCORBIC ACID WITH MANUEL HIPS) 500 MG tablet Take 500 mg by mouth daily.       aspirin 81 mg chewable tablet Chew 81 mg daily.       CALCIUM CITRATE/VITAMIN D3 (CITRACAL + D ORAL) Take 1 tablet by mouth 3 (three) times a day.       cinnamon bark (CINNAMON) 500 mg capsule Take 1,000 mg by mouth daily.       NON FORMULARY lionu juice seaweed 20mg daily       OMEGA 3,6,9 COMBINATION NO.7 ORAL Take 1,000 mg by mouth daily.       omega-3 fatty acids (FISH OIL) 500 mg cap Take 1 tablet by mouth daily.       PROCYAN OLIG/UBI/VIT A/HB#155 (PYCNOGENOL COMPLEX ORAL) Take 60 mg by mouth daily.       No current facility-administered medications for this visit.        Allergies   Allergen Reactions     Shellfish Containing Products          There is no immunization history on file for this patient.    Patient Active Problem List   Diagnosis     Arthritis     History of breast cancer     History of urinary incontinence     Pure hypercholesterolemia      Osteopenia       Past Medical History:   Diagnosis Date     Breast CA        Social History     Social History     Marital status:      Spouse name: N/A     Number of children: N/A     " Years of education: N/A     Occupational History     Not on file.     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  Feel recently.  Walking was worse after fall.  MRI showed torn meniscus.  She might need a replacement. She has received injections for a number of years.      Recent Health  Fever: no  Chills: no  Fatigue: no  Chest Pain: no  Cough: no  Dyspnea: no  Urinary Frequency: no  Nausea: no  Vomiting: no   Diarrhea: no  Abdominal Pain: no  Easy Bruising: yes  Lower Extremity Swelling: no  Poor Exercise Tolerance: no    Most recent Health Maintenance Visit:  6 month(s) ago    Pertinent History  Prior Anesthesia: yes  Previous Anesthesia Reaction:  no  Diabetes: no  Cardiovascular Disease: no  Pulmonary Disease: no  Renal Disease: no  GI Disease: no  Sleep Apnea: no  Thromboembolic Problems: no  Clotting Disorder: no  Bleeding Disorder: no  Transfusion Reaction: No.  Had a transfusion [id number] (acute blood loss after elective breast surgery). No reaction.    Impaired Immunity: no  Steroid use in the last 6 months: yes prednisone   Frequent Aspirin use: no    No family history of anesthesia reaction, clotting disorder and bleeding disorder    Social history of patient does not wear denture or partial plates and there are no concerns regarding care after surgery    After surgery, the patient plans to recover at home with family.    Review of Systems  Review of Systems   Constitutional: Negative.    HENT: Negative.    Eyes: Negative.    Respiratory: Negative.    Cardiovascular: Negative.    Gastrointestinal:  "Negative.    Endocrine: Negative.    Genitourinary: Negative.    Musculoskeletal: Negative.    Skin: Negative.    Neurological: Negative.    Hematological: Negative.    Psychiatric/Behavioral: Negative.              Objective:         Vitals:    03/02/17 1514   BP: 134/74   Pulse: 74   Resp: 24   Temp: 97.7  F (36.5  C)   TempSrc: Oral   SpO2: 97%   Weight: 136 lb 9.6 oz (62 kg)   Height: 5' 5.5\" (1.664 m)       Physical Exam:  Physical Exam   Constitutional: She appears well-developed and well-nourished.   HENT:   Right Ear: External ear normal.   Left Ear: External ear normal.   Nose: Nose normal.   Mouth/Throat: Oropharynx is clear and moist.   Eyes: Conjunctivae and EOM are normal. Pupils are equal, round, and reactive to light. Right eye exhibits no discharge. Left eye exhibits no discharge.   Neck: No thyromegaly present.   Cardiovascular: Normal rate, regular rhythm and normal heart sounds.    No murmur heard.  Pulmonary/Chest: Effort normal and breath sounds normal.   Abdominal: Soft. Bowel sounds are normal. She exhibits no distension and no mass. There is no tenderness. There is no rebound and no guarding.   Musculoskeletal: Normal range of motion. She exhibits edema.   Trace edema at left ankle.     Lymphadenopathy:     She has no cervical adenopathy.   Neurological: She is alert. She has normal reflexes.   Skin: Skin is warm and dry. No rash noted.   Psychiatric: She has a normal mood and affect.     EKG: My personal interpretation-normal sinus rhythm.      Recent Results (from the past 24 hour(s))   Electrocardiogram Perform and Read   Result Value Ref Range    SYSTOLIC BLOOD PRESSURE  mmHg    DIASTOLIC BLOOD PRESSURE  mmHg    VENTRICULAR RATE 67 BPM    ATRIAL RATE 67 BPM    P-R INTERVAL 144 ms    QRS DURATION 70 ms    Q-T INTERVAL 388 ms    QTC CALCULATION (BEZET) 409 ms    P Axis 74 degrees    R AXIS 82 degrees    T AXIS 73 degrees    MUSE DIAGNOSIS       Normal sinus rhythm  Normal ECG  No previous " ECGs available

## 2021-06-09 NOTE — PROGRESS NOTES
I spoke with Tawana at  oncology, where patient is scheduled in October. Tawana, is going to call Eva later on today and get her appointment moved up sooner, she said that would not be a problem.  Manjula

## 2021-06-10 NOTE — PROGRESS NOTES
Subjective:      Patient ID: Eva Shepard is a 71 y.o. female.    Chief Complaint:    HPI Eva Shepard is a 71 y.o. female who presents today complaining of rash and itching x 2 days. Rash started on arms and underarms, and is now spreading down the torso onto the legs. Patient reports that she has sensitive skin. She just moved here from [place]. She was staying with a friend for a few days and used Tide with scent. She usually uses scent free detergent. She also reports that she has some itching on her hand. She wears gloves when she is gardening. She denies any sick symptoms such as fever or chills. Patient reports that she is taking benadryl regularly with little improvement of her symptoms.       Past Medical History:   Diagnosis Date     Breast CA        Past Surgical History:   Procedure Laterality Date     HYSTERECTOMY       INCONTINENCE SURGERY       MASTECTOMY       MASTECTOMY         Family History   Problem Relation Age of Onset     Stroke Mother      Heart disease Brother        Social History   Substance Use Topics     Smoking status: Never Smoker     Smokeless tobacco: Never Used     Alcohol use None       Review of Systems   Constitutional: Negative for chills and fever.   HENT: Negative.    Respiratory: Negative.    Cardiovascular: Negative.    Gastrointestinal: Negative.    Skin: Positive for rash.       Objective:     /76  Pulse 71  Temp 98.3  F (36.8  C) (Oral)   Resp 14  Wt 133 lb 6.4 oz (60.5 kg)  SpO2 98%  BMI 21.86 kg/m2    Physical Exam   Constitutional: She appears well-developed. No distress.   HENT:   Head: Normocephalic and atraumatic.   Right Ear: External ear normal.   Left Ear: External ear normal.   Pulmonary/Chest: Effort normal. No respiratory distress.   Skin: Rash noted. Rash is urticarial. She is not diaphoretic.   Large welt like hives in the left axilla and on the back. Some on the torso and groin area. Patient has some mild excoriation on her palms.     Nursing note and vitals reviewed.    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 Patient was prescribed a short burst of Prednisone to help with the itching and inflammation. I also recommended that she re-wash all of her clothes with scentless detergent.       Procedures      Assessment / Plan:     1. Full body hives  predniSONE (DELTASONE) 20 MG tablet         Patient Instructions   1.Take 2 Tablets of Prednisone for 5 days.   2.Continue taking the Benadryl as you are.   3.Rewash your clothing in scent free detergent.   4.Follow up if no improvement or other symptoms such as lip or throat swelling develop.

## 2021-06-11 NOTE — CONSULTS
[facility] Hematology and Oncology Consult Note    Patient: Eva Shepard  MRN: [id number]  Date of Service: 06/07/2017      Reason for Visit    Referred by Dr. Mor Chaidez MD regarding follow up care for breast cancer    Assessment/Plan    ECOG Performance   ECOG Performance Status: 0  Distress Assessment  Distress Assessment Score: No distress    71-year-old female with history of ER positive right breast DCIS in 1989 and stage I ER/OK positive infiltrating lobular carcinoma of the left breast cancer in 2002.  She underwent right modified radical mastectomy with a right axillary lymph node dissection in 1989. She then had left modified radical mastectomy and axillary lymph node dissection in 10/2002.  She did not receive radiation or chemotherapy.  She was treated with tamoxifen for 5 years.  Reported genetic testing was negative although I do not have actual report.    She is transferring care to us as she is moving back to [place].  She was previously followed by annual chest x-ray and labs including hemogram, complete metabolic profile and CA 27-29. She is about 26-28 years out from her initial diagnosis and overall it appears to be very good prognostic disease.     We discussed about a follow-up surveillance after bilateral mastectomies for early stage breast cancer.  According to the NCCN guidelines, she will not need blood work or chest x-ray, but clinical exam.   We discussed about surveillance going forward.  She reported that it will make her feel better if we see at least once a year with labs and CXR.  I discussed about potential radiation exposure or even a small dose from the chest x-ray.  I explained that I might follow-up for next year with labs and chest x-ray as she has been routinely performing these surveillance measures.  If things going well, I encourage her to return to clinic to exam follow-up alone and the labs according to standard annual physical exam by Dr. Mor Chaidez.  I  discussed about the risk and benefits of continuing annual chest x-ray especially she is nearly 30 years out from her diagnosis of breast cancer due to a small but actual exposure radiation with chest x-ray every year.    - She does not clearly have any evidence of clinical recurrence of breast cancer.  Screening mammogram is not needed.  -As she had recent hemogram and complete metabolic profile within 3 months, I would not recommend repeating these tests.  However I requested CA 27-29 to compare from her baseline.  I requested chest x-ray to complete at her convenience.  - She is also getting colonoscopy every 5 years because of her breast cancer per patient report.  She reported she never had polyps.  She is due for another colonoscopy in 2 years when she turns 73.  I would let gastroenterologist to determine further screening and I do not necessarily think that she needed every 5 year colonoscopy based on her history of breast cancer, rather issued entirely based on the findings on colonoscopy.  -She has osteopenia that she takes calcium and vitamin D supplementation.  -Follow-up with me on Brock Szymanski NP in a year for exam and labs +/-chest x-ray.  She is advised to call me with any concerns or question arise in the meantime.    TT: 65 minutes and more than 50% was spent on coordination and counseling of care.    Problem List    1. Breast cancer  Breast Carcinoma-Associated Antigen (CA 27.29)    XR Chest PA and Lateral     ______________________________________________________________________________    History    Ms. Eva Shepard is a 71 y.o. female presented today herself to establish care with us.  She was initially diagnosed with DCIS in the right breast by screening mammogram in 1989.  She underwent radical mastectomy and axillary lymph node dissection.  In 2002, she was subsequently found to have left breast invasive lobular carcinoma and reported small and no lymph node involvement.  She underwent  left radical mastectomy and axillary lymph node dissection in 2002.  She did not receive any radiation or chemotherapy.  She completed 5 years of tamoxifen as adjuvant endocrine therapy.  She underwent a hysterectomy with bilateral oophorectomy while she had a bladder repair in 2008.  She reported that she underwent genetic testing less than 10 years ago and reported she does not carry mutation.    Her first surgery was completed at [facility] in [facility].  Subsequently she has been followed by an oncologist in [place].  She is now moving back to [place] to closer to her family.  She has hot flushes.  She has tried several different medications and strategies in the past and not help.  She does not have any interest in pursuing any other intervention for hot flashes.  She reported her mother and her sister had hot flashes until in the 70s.  Apart from that she does not have any discomfort or pain.  No new bone pain.  She has chronic arthritis.  She has 2 children and several grandchildren.  She has never smoker.  She drinks alcohol about 8 drinks per week.    Her menarche was at age 11.  LMP was around age 48.  She had 3 pregnancies and 2 children.  Age at first pregnancy was 19.  She was on hormone replacement therapy for about 2 years before she was diagnosed with breast cancer.  Now she is not taking any systemic hormonal therapy.    She reported family history of breast and ovarian cancer but details were unclear as her family did not disclosed much.    Past History    Past Medical History:   Diagnosis Date     Breast CA     osteopenia  Family History   Problem Relation Age of Onset     Stroke Mother      Heart disease Brother        Social History     Social History     Marital status:      Spouse name: N/A     Number of children: N/A     Years of education: N/A     Occupational History     Not on file.     Social History Main Topics     Smoking status: Never Smoker     Smokeless tobacco: Never Used      Alcohol use Not on file     Drug use: Not on file     Sexual activity: Not on file     Other Topics Concern     Not on file     Social History Narrative        Allergies    Allergies   Allergen Reactions     Shellfish Containing Products        Review of Systems    General  General (WDL): Exceptions to WDL  Fatigue: None  Fever: None  Generalized Muscle Weakness: None  Weight Loss: No  ENT  ENT (WDL): Exceptions to WDL  Changes in vision: None  Glasses or Contacts: Yes - Recent (Less than 3 months)  Hearing loss: None  Hearing Aids: None  Tinnitus: None  Pain/Pressure in ears: None  Sinus Congestion/Drainage: None  Hoarseness: None  Sore Throat: None  Dental Problems: None  Dentures: None  Respiratory  Respiratory (WDL): All respiratory elements are within defined limits  Cardiovascular  Cardiovascular (WDL): All cardiovascular elements are within defined limits  Endocrine  Endocrine (WDL): Exceptions to WDL  Heat Intolerance: None  Excessive Thirst: None  Cold Intolerance: None  Excessive Urination: None  Hotflashes: Yes- Chronic (Greater than 3 months)  Gastrointestinal  Gastrointestinal (WDL): All gastrointestinal elements are within defined limits  Musculoskeletal  Musculoskeletal (WDL): Exceptions to WDL  Range of Motion Limitation: None  Joint pain: Yes - Recent (Less than 3 months)  Back Pain: None  Activity Assistance: None  Difficulty to lie flat for more than 30 minutes: None  Pain interfering with walking: None  Muscle pain or stiffness: None  Recent fall: None  Assistive device: None  Neurological  Neurological (WDL): All neurological elements are within defined limits  Psychological/Emotional  Psychological/Emotional (WDL): All psychological/emotional elements are within defined limits  Hematological/Lymphatic  Hematological/Lymphatic (WDL): All hematological/lymphatic elements are within defined limits  Dermatological  Dermatologic (WDL): All dermatological elements are within defined  "limits  Genitourinary/Reproductive  Genitourinary/Reproductive (WDL): All genitourinary/reproductive elements are within defined limits  Reproductive (Females only)     Pain  Currently in Pain: No/denies (recent knee arthroscopy)    Physical Exam    Recent Vitals 6/7/2017   Height 5' 5.5\"   Weight 130 lbs 8 oz   BSA (m2) 1.65 m2   /73   Pulse 75   Temp -   Temp src -   SpO2 99     General: alert, awake, not in acute distress  HEENT: Head: Normal, normocephalic, atraumatic.  Eye: Normal external eye, conjunctiva, lids cornea, MYRON.  Ears:  Non-tender.  Nose: Normal external nose, mucus membranes and septum.  Pharynx: Normal buccal mucosa. Normal pharynx.  Neck / Thyroid: Supple, no masses, nodes, nodules or enlargement.  Lymphatics: No abnormally enlarged lymph nodes.  Chest: Normal chest wall and respirations. Clear to auscultation.  Breasts: Bilateral mastectomies with implants placed.  Nose palpable nodules or skin abnormalities.  Heart: S1 S2 RRR, no murmur.   Abdomen: abdomen is soft without significant tenderness, masses, organomegaly or guarding  Extremities: normal strength, tone, and muscle mass  Skin: normal. no rash or abnormalities  CNS: non focal.      Lab Results    No results found for this or any previous visit (from the past 168 hour(s)).    Imaging Results    No results found.      Signed by: August Arvizu MD  "

## 2021-06-12 NOTE — PROGRESS NOTES
[facility] Hematology and Oncology Progress Note    Patient: Sharon L Schwab  MRN: [id number]  Date of Service:10/22/2020      Reason for Visit    Chief Complaint   Patient presents with     HE Cancer     Breast Cancer       Assessment and Plan  Cancer Staging  Malignant neoplasm of overlapping sites of left breast in female, estrogen receptor positive (H)  Staging form: Breast, AJCC 8th Edition  - Clinical: No stage assigned - Unsigned      ECOG Performance   ECOG Performance Status: 0     Distress Assessment  Distress Assessment Score: No distress    Pain  Currently in Pain: No/denies  Pain Score (Initial OR Reassessment): 5  Location: hands, knees    #.  Right breast DCIS in 1989, hormone receptor positive left breast invasive lobular carcinoma in 2002.    No clinical signs and symptoms suggestive of breast cancer recurrence.  No need for mammographic surveillance, given post bilateral mastectomies.   She would like to continue annual clinical exam.  Follow-up with me in 1 year.    #.  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  Asymptomatic.    No clinically palpable lymphadenopathy.  No B symptoms.  Continue clinical surveillance.    #.  Significantly elevated blood pressure.    Reported she has home blood pressure monitoring device and it normally showed 150-160 and systolic blood pressure.  She was nervous about coming in here today with driving in the snow this morning because there is elevated blood pressure reading.  She has been taking medication regularly.  I strongly advised her to follow-up with her primary care provider.  She agreed with the plan.     Problem List    1. Malignant neoplasm of overlapping sites of left breast in female, estrogen receptor positive (H)        ______________________________________________________________________________  Diagnosis  1989- diagnosed with DCIS in  the right breast by screening mammogram.  2002-left breast invasive lobular carcinoma, early stage.  No lymph no involvement.    Treatment to date  1989-right radical mastectomy and axillary lymph node dissection.  2002-left breast radical mastectomy and axillary lymph node dissection.  No adjuvant chemotherapy or radiation.  Completed 5 years of tamoxifen.  2008-hysterectomy and bilateral oophorectomy.    History of Present Illness    Eva presented herself today.    She has gained about 9 pounds since last visit a year ago.  She denies any new health issue.  Her  passed away last year and she is now remarried to her first .  Her appetite is good.  Her energy level is decent.  She has chronic arthritis in her knees and hands the overall stable.    Pain Status  Currently in Pain: No/denies    Review of Systems    Constitutional  Constitutional (WDL): Exceptions to WDL  Fatigue: None  Fever: None  Chills: None  Weight Gain: 5 - <10% from baseline(9# 10/16/19)  Weight Loss: None  Neurosensory  Neurosensory (WDL): All neurosensory elements are within defined limits  Eye   Eye Disorder (WDL): All eye disorder elements are within defined limits  Ear  Ear Disorder (WDL): All ear disorder elements are within defined limits  Cardiovascular  Cardiovascular (WDL): All cardiovascular elements are within defined limits  Pulmonary  Respiratory (WDL): Within Defined Limits  Gastrointestinal  Gastrointestinal (WDL): All gastrointestinal elements are within defined limits  Genitourinary  Genitourinary (WDL): All genitourinary elements are within defined limits  Lymphatic  Lymph (WDL): All lymph disorder elements are within defined limits  Musculoskeletal and Connective Tissue  Musculoskeletal and Connetive Tissue Disorders (WDL): Exceptions to WDL  Arthralgia: Moderate pain, limiting instrumental ADL  Bone Pain: None  Muscle Weakness : None  Myalgia: Moderate pain, limiting instrumental  ADL  Integumentary  Integumentary (WDL): All integumentary elements are within defined limits  Patient Coping  Patient Coping: Accepting;Open/discussion  Distress Assessment  Distress Assessment Score: No distress  Accompanied by  Accompanied by: Alone  Oral Chemo Adherence       Past History  Past Medical History:   Diagnosis Date     Breast CA (H)        Physical Exam    Recent Vitals 10/22/2020   Height -   Weight -   BSA (m2) -   /99   Pulse -   Temp -   Temp src -   SpO2 -   Some recent data might be hidden     General: alert, awake, not in acute distress  HEENT: Head: Normal, normocephalic, atraumatic.  Eye: Normal external eye, conjunctiva, lids cornea, MYRON.  Ears:  Non-tender.  Nose: Normal external nose, mucus membranes and septum.  Pharynx: Dental Hygiene adequate. Normal buccal mucosa. Normal pharynx.  Neck / Thyroid: Supple, no masses, nodes, nodules or enlargement.  Lymphatics: No abnormally enlarged lymph nodes.  Chest: Normal chest wall and respirations. Clear to auscultation.  Breasts: Bilateral mastectomy with post implants.  No palpable abnormalities.  Heart: S1 S2 RRR, no murmur.   Abdomen: abdomen is soft without significant tenderness, masses, organomegaly or guarding  Extremities: normal strength, tone, and muscle mass  Skin: normal. no rash or abnormalities  CNS: non focal.      Lab Results    No results found for this or any previous visit (from the past 168 hour(s)).    Imaging    No results found.    Signed by: August Arvizu MD

## 2021-06-12 NOTE — PROGRESS NOTES
Assessment and Plan:     Patient has been advised of split billing requirements and indicates understanding: Yes  Problem List Items Addressed This Visit     History of breast cancer    Relevant Orders    HM2(CBC w/o Differential) (Completed)    Pure hypercholesterolemia     Osteopenia     Declined treatment for osteopenia last year.  Recheck DEXA in 1-2 years given abnormal and worsening result.           Benign essential hypertension     This patient has had persistent elevation of blood pressure across clinic visits (2 different health system) over the past couple of years.  She does meet criteria for essential hypertension.  We discussed that treating high blood pressure is in her interest given her family history of stroke in her mother.  She did agree to a prescription of lisinopril.  10 mg was prescribed.  She will check her blood pressure at home frequently and report back whether or not her body is responding.  She was resistant to my suggestion that she return to clinic for recheck.  However, given the severity of elevation I think it is reasonable start this medication.  Consider BMP recheck.         Relevant Medications    lisinopriL (PRINIVIL,ZESTRIL) 10 MG tablet    Other Relevant Orders    Basic Metabolic Panel    ALT (SGPT)    Routine general medical examination at a health care facility - Primary     Patient presents for annual exam.  She says that much of her emotional energy over the past year has been focused on supporting her  who is undergoing Parkinson's disease and has experienced a decline of overall function.  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  She recently saw her oncologist who believes that she is unlikely to have recurrence of breast cancer.  Blood pressure concerns as discussed elsewhere.  She takes a number of supplements.  We will discontinue checking cholesterol as the patient states a strong preference to not to be on a  "cholesterol-lowering medication.  Osteopenia as discussed elsewhere.           Other Visit Diagnoses     Elevated fasting glucose        Relevant Orders    Basic Metabolic Panel    ALT (SGPT)    Glycosylated Hemoglobin A1c        The patient's current medical problems were reviewed.    I have had an Advance Directives discussion with the patient.  The following health maintenance schedule was reviewed with the patient and provided in printed form in the after visit summary:   Health Maintenance   Topic Date Due     HEPATITIS C SCREENING  1945     DXA SCAN  01/09/2019     Pneumococcal Vaccine: Pediatrics (0 to 5 Years) and At-Risk Patients (6 to 64 Years) (2 of 3 - PPSV23) 11/03/2020 (Originally 3/13/2019)     Pneumococcal Vaccine: 65+ Years (2 of 2 - PPSV23) 11/03/2020 (Originally 1/16/2020)     MEDICARE ANNUAL WELLNESS VISIT  10/29/2021     FALL RISK ASSESSMENT  10/29/2021     LIPID  12/12/2021     ADVANCE CARE PLANNING  10/29/2025     COLORECTAL CANCER SCREENING  09/10/2028     TD 18+ HE  03/04/2029     INFLUENZA VACCINE RULE BASED  Completed     ZOSTER VACCINES  Completed     HEPATITIS B VACCINES  Aged Out        Subjective:   Chief Complaint: Sharon L Schwab is an 75 y.o. female here for an Annual Wellness visit.   HPI:      Some aches and pains.   with parkinson's (symptoms are mostly movement.)  with DM. Kids live close by.  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  Busier than normal in the context of COVID-19.      BP: \"I am always high.\"  At home BP is in the 150s.  Mom with history of stroke.  Colonoscopy was okay last year.      Knee osteoarthritis: she continues to see rheum for right knee pain.       Review of Systems:  Please see above.  The rest of the review of systems are negative for all systems.    Patient Care Team:  Mor Chaidez MD as PCP - General (Family Medicine)  Lombardi, Susan L, RN as RN, Care Manager  August Arvizu MD as Physician (Hematology and Oncology)  St Roman, " MD Mor as Assigned PCP  August Arvizu MD as Assigned Cancer Care Provider     Patient Active Problem List   Diagnosis     Arthritis     History of breast cancer     History of urinary incontinence     Pure hypercholesterolemia      Osteopenia     Unexplained night sweats     Polyarthritis of hand     Dysuria     Primary osteoarthritis of left knee     Lymphadenopathy     Malignant neoplasm of overlapping sites of left breast in female, estrogen receptor positive (H)     Benign essential hypertension     Routine general medical examination at a health care facility     Past Medical History:   Diagnosis Date     Breast CA (H)       Past Surgical History:   Procedure Laterality Date     HYSTERECTOMY       INCONTINENCE SURGERY       MASTECTOMY       MASTECTOMY       REPLACEMENT TOTAL KNEE      left knee      Family History   Problem Relation Age of Onset     Stroke Mother      No Medical Problems Father      Heart disease Brother      Arthritis Sister      Breast cancer Paternal Grandmother      No Medical Problems Daughter      No Medical Problems Daughter      Colon cancer Neg Hx      Prostate cancer Neg Hx       Social History     Socioeconomic History     Marital status:      Spouse name: Not on file     Number of children: Not on file     Years of education: Not on file     Highest education level: Not on file   Occupational History     Not on file   Social Needs     Financial resource strain: Not on file     Food insecurity     Worry: Not on file     Inability: Not on file     Transportation needs     Medical: Not on file     Non-medical: Not on file   Tobacco Use     Smoking status: Never Smoker     Smokeless tobacco: Never Used   Substance and Sexual Activity     Alcohol use: Not on file     Drug use: Not on file     Sexual activity: Not on file   Lifestyle     Physical activity     Days per week: Not on file     Minutes per session: Not on file     Stress: Not on file   Relationships      "Social connections     Talks on phone: Not on file     Gets together: Not on file     Attends Protestant service: Not on file     Active member of club or organization: Not on file     Attends meetings of clubs or organizations: Not on file     Relationship status: Not on file     Intimate partner violence     Fear of current or ex partner: Not on file     Emotionally abused: Not on file     Physically abused: Not on file     Forced sexual activity: Not on file   Other Topics Concern     Not on file   Social History Narrative     Not on file      Current Outpatient Medications   Medication Sig Dispense Refill     ascorbic acid, vitamin C, (ASCORBIC ACID WITH MANUEL HIPS) 500 MG tablet Take 500 mg by mouth daily.       aspirin 81 MG EC tablet Take 81 mg by mouth daily.       CALCIUM CITRATE/VITAMIN D3 (CITRACAL + D ORAL) Take 1 tablet by mouth 3 (three) times a day.       cholecalciferol, vitamin D3, (VITAMIN D3) 2,000 unit capsule Take 2,000 Units by mouth daily.       cinnamon bark (CINNAMON) 500 mg capsule Take 1,000 mg by mouth daily.       OMEGA 3,6,9 COMBINATION NO.7 ORAL Take 1,000 mg by mouth daily.       lisinopriL (PRINIVIL,ZESTRIL) 10 MG tablet Take 1 tablet (10 mg total) by mouth daily. 30 tablet 11     No current facility-administered medications for this visit.       Objective:   Vital Signs:   Visit Vitals  /90 (Patient Site: Left Arm, Patient Position: Sitting, Cuff Size: Adult Regular)   Pulse 74   Temp 98.2  F (36.8  C) (Oral)   Resp 20   Ht 5' 4\" (1.626 m)   Wt 144 lb (65.3 kg)   SpO2 98% Comment: room air   Breastfeeding No   BMI 24.72 kg/m      VisionScreening:  No exam data present     PHYSICAL EXAM  Physical Exam  Constitutional:       General: She is not in acute distress.     Appearance: She is well-developed.   HENT:      Head: Normocephalic and atraumatic.      Right Ear: External ear normal.      Left Ear: External ear normal.      Mouth/Throat:      Pharynx: No oropharyngeal exudate. "   Eyes:      General: No scleral icterus.        Left eye: No discharge.      Conjunctiva/sclera: Conjunctivae normal.      Pupils: Pupils are equal, round, and reactive to light.   Neck:      Musculoskeletal: Normal range of motion and neck supple.      Thyroid: No thyromegaly.   Cardiovascular:      Rate and Rhythm: Normal rate and regular rhythm.      Heart sounds: Normal heart sounds. No murmur. No friction rub. No gallop.    Pulmonary:      Effort: Pulmonary effort is normal. No respiratory distress.      Breath sounds: Normal breath sounds. No wheezing.   Abdominal:      General: There is no distension.      Palpations: Abdomen is soft. There is no mass.      Tenderness: There is no abdominal tenderness.      Hernia: No hernia is present.   Musculoskeletal: Normal range of motion.   Lymphadenopathy:      Cervical: No cervical adenopathy.   Skin:     General: Skin is warm.      Findings: No rash.   Neurological:      Mental Status: She is alert and oriented to person, place, and time.      Cranial Nerves: No cranial nerve deficit.      Deep Tendon Reflexes: Reflexes are normal and symmetric. Reflexes normal.   Psychiatric:         Behavior: Behavior normal.         Thought Content: Thought content normal.         Judgment: Judgment normal.             Assessment Results 10/29/2020   Activities of Daily Living No help needed   Instrumental Activities of Daily Living No help needed   Get Up and Go Score Less than 12 seconds   Mini Cog Total Score 4   Some recent data might be hidden     A Mini-Cog score of 0-2 suggests the possibility of dementia, score of 3-5 suggests no dementia    Identified Health Risks:     The patient reports that she drinks more than one alcoholic drink per day but denies binge or excessive drinking. She was counseled and given information about possible harmful effects of excessive alcohol intake.  She is at risk for falling and has been provided with information to reduce the risk of  falling at home.  Patient's advanced directive was discussed and I am comfortable with the patient's wishes.

## 2021-06-13 NOTE — PROGRESS NOTES
Assessment/Plan:    Benign essential hypertension  Blood pressure improved with today's measurement.  No side effects from lisinopril.  Check basic metabolic panel.  Recheck in 6 months recommended.    Skin lesion  Anterior chest.  Procedure only.  Medical decision making included with recent physical.    Return in about 6 months (around 6/3/2021) for Hypertension.    Mor Chaidez MD  _______________________________    Chief Complaint   Patient presents with     Follow-up     blood pressure     [place]     pt would like mole on chest removed     Subjective: Sharon L Schwab is a 75 y.o. year old female who returns to clinic for the following chronic complaints/concerns:     BP:   - no concerns.  No lightheadedness.  No dizziness.  No cough.  Doing well.     Skin lesion: Anterior chest.  Identified during recent physical.  Interested in excision.  See procedure note.    Review of systems is negative except for as shown in the HPI.    The following portions of the patient's history were reviewed and updated as appropriate: allergies, current medications, past medical history and problem list.    Objective:    weight is 142 lb (64.4 kg). Her oral temperature is 98.1  F (36.7  C). Her blood pressure is 126/74 and her pulse is 80. Her respiration is 16 and oxygen saturation is 98%.   General: No acute distress  Respiratory: Nonlabored breathing.  Psych: Bright affect.  Skin: Anterior medial chest just left of the distal left sternal border with 2-3 mm circular homogenous slightly raised dark lesion.  No other adjacent lesions with similar appearance.  Underlying the lesion is a 4 mm in length raised flesh-colored lesion.    No data recorded  No data recorded  No data recorded  No data recorded    No results found for this or any previous visit (from the past 24 hour(s)).    Additional History from Old Records Summarized (2): no  Decision to Obtain Records (1): no  Radiology Tests Summarized or Ordered (1): no  Labs  Reviewed or Ordered (1): yes  Medicine Test Summarized or Ordered (1): no  Independent Review of EKG or X-RAY(2 each): no    This note has been dictated using voice recognition software. Any grammatical or context distortions are unintentional and inherent to the software

## 2021-06-13 NOTE — PROGRESS NOTES
ASSESSMENT AND PLAN:  Eva Shepard 72 y.o. female is seen here on 10/26/17 for evaluation of polyarthralgias, well described osteoarthritis, in the past on hydroxychloroquine, been off it for the past 8 months without noticing any deleterious effects.  That she has arthropathy of her hands is quite apparent clinically.  This may be an atypical joint for primary degenerative arthropathy such as #2 and #3 MCPs more likely a secondary degenerative process such as calcium pyrophosphate disease, and of course rheumatoid arthritis can affect the same joints.  Of the various options that we have today including taking x-rays and looking into anti-CCP antibody rheumatoid factor she is more inclined to keep it under a watch and return for follow-up.  Down the road.  That sounds reasonable.  She is aware that should she have any significant symptoms she will return sooner.  We will meet here in 6 months.  Diagnoses and all orders for this visit:    Polyarthritis of hand          HISTORY OF PRESENTING ILLNESS ON 10/26/17 :  Eva Shepard 72 y.o. is here for follow-up of her visit here earlier this year with Dr. Una doyle rheumatologist.  She had moved from [place] back to [place] to be closer to her family after having lost her  in 2016 February.  She has had children and grandchildren [place].  She was on hydroxychloroquine and presumably for osteoarthritis.  She had discussed this with Dr. Sidhu and the decision was for her to stop and see how she does.  Having stopped it over the past nearly 8 months now she does not feel that anything has changed for the worse.  She still takes ibuprofen off and on.  She would have discomfort in her knees, in her hands.  She points to the second MCP on the right hand and the left hand to be the more bothersome area.  This is noted to be mild.  3.0/10.  She has undergone arthroscopic surgery of the knee, she is undergoing currently a course of Visco supplementation at  orthopedics.  She is able to do all her day-to-day activities without difficulty there is no stiffness in the morning.  So she is not only not stiff in the morning but does many of the chores in the morning regarding feeding the horses.  She has no history of rash psoriasis she has history of constipation easy bruising.  Is lost 5 pounds of weight recently.  There is no history of smoking alcohol 4 drinks per week.  She has had mastectomy bilateral for breast cancer in 1989.  There is no family history of psoriasis ulcerative colitis Crohn's disease rheumatoid arthritis or lupus.  .    Further historical information, including ROS and limitation in activities as noted in the multidimensional health assessment questionnaire scanned in the EMR and in the assessment and plan section.    ALLERGIES:Shellfish containing products    PAST MEDICAL/ACTIVE PROBLEMS/MEDICATION/SOCIAL DATA  Past Medical History:   Diagnosis Date     Breast CA      History   Smoking Status     Never Smoker   Smokeless Tobacco     Never Used     Patient Active Problem List   Diagnosis     Arthritis     History of breast cancer     History of urinary incontinence     Pure hypercholesterolemia      Osteopenia     Hyponatremia     Unexplained night sweats     Current Outpatient Prescriptions   Medication Sig Dispense Refill     ascorbic acid, vitamin C, (ASCORBIC ACID WITH MANUEL HIPS) 500 MG tablet Take 500 mg by mouth daily.       aspirin 81 mg chewable tablet Chew 81 mg daily.       CALCIUM CITRATE/VITAMIN D3 (CITRACAL + D ORAL) Take 1 tablet by mouth 3 (three) times a day.       cinnamon bark (CINNAMON) 500 mg capsule Take 1,000 mg by mouth daily.       HYDROcodone-acetaminophen 5-325 mg per tablet   0     NON FORMULARY lionu juice seaweed 20mg daily       OMEGA 3,6,9 COMBINATION NO.7 ORAL Take 1,000 mg by mouth daily.       omega-3 fatty acids (FISH OIL) 500 mg cap Take 1 tablet by mouth daily.       PROCYAN OLIG/UBI/VIT A/HB#155 (PYCNOGENOL  "COMPLEX ORAL) Take 60 mg by mouth daily.       No current facility-administered medications for this visit.      DETAILED EXAMINATION  10/26/17  :  Vitals:    10/26/17 1006   BP: 156/70   Pulse: 60   Weight: 130 lb 8 oz (59.2 kg)   Height: 5' 5.5\" (1.664 m)     Alert oriented. Head including the face is examined for malar rash, heliotropes, scarring, lupus pernio. Eyes examined for redness such as in episcleritis/scleritis, periorbital lesions.   Neck examined  for lymph nodes, range of motion Both upper and lower extremities (all four) examined for swollen, warm &/or  tender joints, range of motion, rash, muscle weakness, edema. The salient normal / abnormal findings are appended.  She has JLT of the knees she has Heberden nodes she has squaring of the first CMC's.  She has quite impressive synovial thickening of the MCPs #2 and 3 bilaterally.  Probably more prominent on the right second MCP area.    LAB / IMAGING DATA:  ALT   Date Value Ref Range Status   01/25/2017 42 0 - 45 U/L Final     Albumin   Date Value Ref Range Status   01/25/2017 3.9 3.5 - 5.0 g/dL Final     Creatinine   Date Value Ref Range Status   05/18/2017 0.66 0.60 - 1.10 mg/dL Final   03/02/2017 0.61 0.60 - 1.10 mg/dL Final   01/25/2017 0.61 0.60 - 1.10 mg/dL Final       WBC   Date Value Ref Range Status   03/02/2017 4.3 4.0 - 11.0 thou/uL Final     Hemoglobin   Date Value Ref Range Status   03/02/2017 12.7 12.0 - 16.0 g/dL Final     Platelets   Date Value Ref Range Status   03/02/2017 237 140 - 440 thou/uL Final       No results found for: JONAS, RF, SEDRATE       "

## 2021-06-13 NOTE — PROGRESS NOTES
"Skin / nail biopsy    Date/Time: 12/3/2020 10:16 AM  Performed by: Mor Chaidez MD  Authorized by: Mor Chiadez MD   Consent: Written consent obtained.  Risks and benefits: risks, benefits and alternatives were discussed  Consent given by: patient  Patient understanding: patient states understanding of the procedure being performed  Patient consent: the patient's understanding of the procedure matches consent given  Procedure consent: procedure consent matches procedure scheduled  Relevant documents: relevant documents present and verified  Site marked: the operative site was marked  Required items: required blood products, implants, devices, and special equipment available  Patient identity confirmed: verbally with patient  Time out: Immediately prior to procedure a \"time out\" was called to verify the correct patient, procedure, equipment, support staff and site/side marked as required.  Preparation: Patient was prepped and draped in the usual sterile fashion.  Local anesthesia used: yes    Anesthesia:  Local anesthesia used: yes    Sedation:  Patient sedated: no    Patient tolerance: Patient tolerated the procedure well with no immediate complications  Comments: After discussion of risks and benefits, using sterile technique a shave biopsy was taken of a 2-3 mm circular dark lesion on the anterior chest.  Hemostasis obtained.  EBL less than 1 cc.  Bandage applied.  Specimen dropped in a marked container for pathology evaluation.          "

## 2021-06-14 NOTE — PROGRESS NOTES
"Assessment/Plan:    Problem List Items Addressed This Visit     Dysuria - Primary     Patient is describing dysuria and urgency to void.  She is status post hysterectomy approximately 20+ years ago.  She also had some type of bladder sling procedure.  Symptoms are new in the past 3 weeks.  She had an expected postsurgical physical exam.  -Urinalysis today not showing infection   -Recommended attention to bowel function with judicious use of stool softening agents to achieve more regular bowel movements.  -If not improving with these measures, consider referral to gynecology to discuss urodynamic testing.         Relevant Orders    Urinalysis-UC if Indicated (Completed)          Return in about 4 weeks (around 12/18/2017) for recheck follow-up visit.    Mor Chaidez MD  _______________________________    Chief Complaint   Patient presents with     Questions For Providers/results     pt states she is concerned her \"bladder is falling\"      Subjective: Eva Shepard is a 72 y.o. year old female who returns to clinic for the following chronic complaints/concerns:     Bladder concerns:   - monthly symptoms of dysuria.  Pressure.  She is s/p bladder repair.  S/p Hysterectomy in 2008   - she does not leak urine.  She does have urgency.     - she does wear a pad in case.   - BM: 1x/week.  She takes prune juice.     - duration: the last three weeks has had more urgency   - she has had double voiding.    Review of systems is negative except for as shown in the HPI.    The following portions of the patient's history were reviewed and updated as appropriate: allergies, current medications, past medical history and problem list.    Objective:    weight is 127 lb (57.6 kg). Her oral temperature is 98.2  F (36.8  C). Her blood pressure is 126/60 and her pulse is 72.   General: No acute distress, pleasant.  Abdomen: Soft, nondistended, nontender.  : External genitalia is without abnormalities.  The vaginal vault mucosa is " without abnormalities.  No cervix is present.  No discharge.  Bimanual exam does not identify abnormalities.    No results found for this or any previous visit (from the past 24 hour(s)).    Additional History from Old Records Summarized (2): no  Decision to Obtain Records (1): no  Radiology Tests Summarized or Ordered (1): no  Labs Reviewed or Ordered (1): yes  Medicine Test Summarized or Ordered (1): no  Independent Review of EKG or X-RAY(2 each): no    This note has been dictated using voice recognition software. Any grammatical or context distortions are unintentional and inherent to the software

## 2021-06-15 PROBLEM — R61 UNEXPLAINED NIGHT SWEATS: Status: ACTIVE | Noted: 2017-04-03

## 2021-06-15 NOTE — PROGRESS NOTES
Assessment/Plan:    Benign essential hypertension  This patient's blood pressure measurements over the past month have been elevated.  She is a history of hyponatremia of unclear etiology.  She is tolerated lisinopril well.  No obvious secondary cause of worsening hypertension.  We will simply titrate lisinopril from 10 mg to 20 mg/day.  Check basic metabolic panel.  Nurse visit will be scheduled in a couple of weeks for blood pressure measurement.  I will see her back likely in 1 month when she comes in for a preoperative exam (cataracts).     Return in about 4 weeks (around 4/6/2021) for Recheck follow-up visit, Hypertension.    Mor Chaidez MD  _______________________________    Chief Complaint   Patient presents with     Follow-up     blood pressure      Subjective: Sharon L Schwab is a 75 y.o. year old female who I have seen in clinic before who presents with the following acute complaint(s):    BP:   -The patient has had some recent problems with her eyes which her ophthalmology clinic has associated with elevated blood pressure.  Her measurements at home have been consistently elevated (through home care nurse using a manual cuff).  She otherwise feels well.  No lightheadedness.  No dizziness.  No fainting.  No falling down.  She does not identify any side effects from the lisinopril that she is currently taking.  She wonders if she needs additional dosing given the recent measurements.    ROS: Complete review of systems obtained.  Pertinent items are listed above.     The following portions of the patient's history were reviewed and updated as appropriate: allergies, current medications, past medical history and problem list.     Objective:   /80 (Patient Site: Left Arm, Patient Position: Sitting, Cuff Size: Adult Regular)   Pulse 68   Temp 98.2  F (36.8  C) (Oral)   Resp 16   Wt 144 lb (65.3 kg)   SpO2 99% Comment: room air  Breastfeeding No   BMI 24.72 kg/m    Gen: No acute distress,  pleasant.  Cardiac: Regular rate and rhythm, normal S1/S2, no murmurs of the gallops  Respiratory: Clear to auscultation bilaterally.  Psych: Normal affect    No results found for this or any previous visit (from the past 24 hour(s)).  No results found.    This note has been dictated using voice recognition software. Any grammatical or context distortions are unintentional and inherent to the software

## 2021-06-16 PROBLEM — C50.812 MALIGNANT NEOPLASM OF OVERLAPPING SITES OF LEFT BREAST IN FEMALE, ESTROGEN RECEPTOR POSITIVE (H): Status: ACTIVE | Noted: 2019-10-17

## 2021-06-16 PROBLEM — H26.9 CATARACT OF BOTH EYES, UNSPECIFIED CATARACT TYPE: Status: ACTIVE | Noted: 2021-05-13

## 2021-06-16 PROBLEM — R30.0 DYSURIA: Status: ACTIVE | Noted: 2017-11-20

## 2021-06-16 PROBLEM — Z17.0 MALIGNANT NEOPLASM OF OVERLAPPING SITES OF LEFT BREAST IN FEMALE, ESTROGEN RECEPTOR POSITIVE (H): Status: ACTIVE | Noted: 2019-10-17

## 2021-06-16 PROBLEM — L98.9 SKIN LESION: Status: ACTIVE | Noted: 2020-12-03

## 2021-06-16 PROBLEM — I10 BENIGN ESSENTIAL HYPERTENSION: Status: ACTIVE | Noted: 2020-10-29

## 2021-06-16 PROBLEM — M13.0 POLYARTHRITIS OF HAND: Status: ACTIVE | Noted: 2017-10-26

## 2021-06-16 PROBLEM — M17.12 PRIMARY OSTEOARTHRITIS OF LEFT KNEE: Status: ACTIVE | Noted: 2018-06-15

## 2021-06-16 PROBLEM — Z00.00 ROUTINE GENERAL MEDICAL EXAMINATION AT A HEALTH CARE FACILITY: Status: ACTIVE | Noted: 2020-10-29

## 2021-06-16 PROBLEM — R59.1 LYMPHADENOPATHY: Status: ACTIVE | Noted: 2019-10-17

## 2021-06-17 NOTE — TELEPHONE ENCOUNTER
Benign essential hypertension  Potassium is elevated.  The patient's blood pressure did come down after she was seated and rested for about 1/2-hour.  Given risks of hyperkalemia, I think it is worth while at this time to maintain her current dose of 20 mg.  Prescription was adjusted at the pharmacy.  We will need to contact the patient and let her know of this adjustment from our discussion yesterday.

## 2021-06-17 NOTE — PATIENT INSTRUCTIONS - HE

## 2021-06-17 NOTE — PROGRESS NOTES
[facility]  [address]  [place] [address]  Dept: [phone]  Dept Fax: [phone]  Primary Provider: Mor Chaidez MD    PREOPERATIVE EVALUATION:  Today's date: 5/13/2021    Sharon L Schwab is a 75 y.o. female who presents for a preoperative evaluation.    Surgical Information:  Surgery/Procedure: cataract right eye and left eye   Surgery Location: [facility]   Surgeon: Dr. Carver   Surgery Date: 05/17/2021 and 06/07/2021  Time of Surgery: TBD   Where patient plans to recover: At home with family  Fax number for surgical facility: [phone]    Type of Anesthesia Anticipated: Local with MAC    Benign essential hypertension  BP elevated at home and in clinic.  Increase lisinopril.  Check BMP.      Cataract of both eyes, unspecified cataract type  No contraindication to surgery.  Able to perform >4 metabolic equivalents.  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       Malignant neoplasm of overlapping sites of left breast in female, estrogen receptor positive (H)  Infrequent surveillance.  Not a concern this year.     Subjective     HPI related to upcoming procedure: cannot see at night.  Concerns for high blood pressure.  RN for  checks periodically.  SHe feels well.  Loss of visual acuity.  No chest pain.  No chest tightness.     Preop Questions 5/13/2021   Have you ever had a heart attack or stroke? No   Have you ever had surgery on your heart or blood vessels, such as a stent placement, a coronary artery bypass, or surgery on an artery in your head, neck, heart, or legs? No   Do you have chest pain with activity? No   Do you have a history of  heart failure? No   Do you currently have a cold, bronchitis or symptoms of other infection? No   Do you have a cough, shortness of breath, or wheezing? No   Do you or anyone in your family have previous history of blood clots? No   Do you or does anyone in your family have a serious  bleeding problem such as prolonged bleeding following surgeries or cuts? No   Have you ever had problems with anemia or been told to take iron pills? No   Have you had any abnormal blood loss such as black, tarry or bloody stools, or abnormal vaginal bleeding? No   Have you ever had a blood transfusion? No   Are you willing to have a blood transfusion if it is medically needed before, during, or after your surgery? Yes   Have you or any of your relatives ever had problems with anesthesia? No   Do you have sleep apnea, excessive snoring or daytime drowsiness? No   Do you have any artifical heart valves or other implanted medical devices like a pacemaker, defibrillator, or continuous glucose monitor? No   Do you have artificial joints? YES - left knee   Are you allergic to latex? No     Health Care Directive:  Patient has a Health Care Directive on file.     Review of Systems   Constitutional: Negative.    HENT: Negative.    Eyes: Negative.    Respiratory: Negative.    Cardiovascular: Negative.    Gastrointestinal: Negative.    Genitourinary: Negative.    Musculoskeletal: Negative.    Skin: Negative.    Neurological: Negative.    Psychiatric/Behavioral: Negative.        Patient Active Problem List    Diagnosis Date Noted     Cataract of both eyes, unspecified cataract type 05/13/2021     Skin lesion 12/03/2020     Benign essential hypertension 10/29/2020     Routine general medical examination at a health care facility 10/29/2020     Lymphadenopathy 10/17/2019     Malignant neoplasm of overlapping sites of left breast in female, estrogen receptor positive (H) 10/17/2019     Primary osteoarthritis of left knee 06/15/2018     Dysuria 11/20/2017     Polyarthritis of hand 10/26/2017     Unexplained night sweats 04/03/2017     Arthritis 12/09/2016     History of breast cancer 12/09/2016     History of urinary incontinence 12/09/2016     Pure hypercholesterolemia  12/09/2016     Osteopenia 12/09/2016     Past Medical  "History:   Diagnosis Date     Breast CA (H)      Past Surgical History:   Procedure Laterality Date     HYSTERECTOMY       INCONTINENCE SURGERY       MASTECTOMY       MASTECTOMY       REPLACEMENT TOTAL KNEE      left knee     Current Outpatient Medications   Medication Sig Dispense Refill     ascorbic acid, vitamin C, (ASCORBIC ACID WITH MANUEL HIPS) 500 MG tablet Take 500 mg by mouth daily.       aspirin 81 MG EC tablet Take 81 mg by mouth daily.       CALCIUM CITRATE/VITAMIN D3 (CITRACAL + D ORAL) Take 1 tablet by mouth 3 (three) times a day.       cholecalciferol, vitamin D3, (VITAMIN D3) 2,000 unit capsule Take 2,000 Units by mouth daily.       cinnamon bark (CINNAMON) 500 mg capsule Take 1,000 mg by mouth daily.       lisinopriL (PRINIVIL,ZESTRIL) 40 MG tablet Take 1 tablet (40 mg total) by mouth daily. 90 tablet 1     OMEGA 3,6,9 COMBINATION NO.7 ORAL Take 1,000 mg by mouth daily.       No current facility-administered medications for this visit.        Allergies   Allergen Reactions     Glucosamine Angioedema     Shellfish Containing Products        Social History     Tobacco Use     Smoking status: Never Smoker     Smokeless tobacco: Never Used   Substance Use Topics     Alcohol use: Not on file       Social History     Substance and Sexual Activity   Drug Use Not on file        Objective     /68 (Patient Site: Left Arm, Patient Position: Sitting, Cuff Size: Adult Regular)   Pulse 72   Temp 98  F (36.7  C) (Oral)   Ht 5' 3.5\" (1.613 m)   Wt 143 lb (64.9 kg)   SpO2 97% Comment: room air  Breastfeeding No   BMI 24.93 kg/m    Physical Exam   Constitutional: She appears well-developed and well-nourished.   HENT:   Right Ear: External ear normal.   Left Ear: External ear normal.   Nose: Nose normal.   Mouth/Throat: Oropharynx is clear and moist.   Eyes: Pupils are equal, round, and reactive to light. Conjunctivae and EOM are normal. Right eye exhibits no discharge. Left eye exhibits no discharge. "   Neck: No thyromegaly present.   Cardiovascular: Normal rate, regular rhythm and normal heart sounds.   No murmur heard.  Pulmonary/Chest: Effort normal and breath sounds normal.   Abdominal: Soft. Bowel sounds are normal. She exhibits no distension and no mass. There is no abdominal tenderness. There is no rebound and no guarding.   Musculoskeletal: Normal range of motion.      Comments: No joint swelling or deformity.   Lymphadenopathy:     She has no cervical adenopathy.   Neurological: She is alert. She has normal reflexes.   Skin: Skin is warm and dry. No rash noted.   Psychiatric: She has a normal mood and affect.         Recent Labs   Lab Test 12/03/20  1019 10/29/20  1202   HGB  --  14.1   PLT  --  194   * 135*   K 3.8 4.0   CREATININE 0.73 0.66        PRE-OP Diagnostics:   No results found for this or any previous visit (from the past 48 hour(s)).  No EKG required for low risk surgery (cataract, skin procedure, breast biopsy, etc).    REVISED CARDIAC RISK INDEX (RCRI)   The patient has the following serious cardiovascular risks for perioperative complications:   - No serious cardiac risks = 0 points    RCRI INTERPRETATION: 0 points: Class I (very low risk - 0.4% complication rate)         Signed Electronically by: Mor Chaidez MD    Copy of this evaluation report is provided to requesting physician.

## 2021-06-18 NOTE — PROGRESS NOTES
Preoperative Exam    Scheduled Procedure: left knee replacement   Surgery Date:  06/27/2018  Surgery Location: [facility]     Surgeon:  Dr. Soria     Assessment/Plan:     Problem List Items Addressed This Visit     Primary osteoarthritis of left knee     Reviewed the documentation/thought process leading up to the decision to pursue total left knee arthroplasty.  I discussed this with the patient and she is clear and her desire to have the surgery in order to improve the quality of her life and her ability to walk.  There is no contra indication to surgery.  We discussed that she will be anticoagulated per Dr. Soria' recommendations in his note.  She is comfortable with these recommendations.  -Check laboratory tests.  History of hyponatremia which is been stable and previously worked up.  Anticipate low normal sodium or slightly decreased sodium.  Check hemoglobin.  -The patient would like to continue follow-up with rheumatology.  We discussed follow-up plans.         Relevant Orders    HM2(CBC w/o Differential) (Completed)    Basic Metabolic Panel (Completed)      Other Visit Diagnoses     Pre-operative examination    -  Primary    Relevant Orders    Electrocardiogram Perform and Read (Completed)    HM2(CBC w/o Differential) (Completed)    Basic Metabolic Panel (Completed)    Screen for colon cancer        Relevant Orders    Ambulatory referral for Colonoscopy        Surgical Procedure Risk: Intermediate (reported cardiac risk generally 1-5%)  Have you had prior anesthesia?: Yes  Have you or any family members had a previous anesthesia reaction:  No  Do you or any family members have a history of a clotting or bleeding disorder?: No  Cardiac Risk Assessment: no increased risk for major cardiac complications    Patient approved for surgery with general or local anesthesia.  No contraindication to surgery.  Able to perform >4 metabolic equivalents.    Functional Status: Independent  Patient plans to recover at  home with family.     Subjective:      Sharon L Schwab is a 72 y.o. female who presents for a preoperative consultation.  This patient has approximately 14 years of knee pain without specific injury.  Pain is worse with stairs and standing as well as walking.  She has stiffness as well as decreased range of motion.  She has tried cortisone injections.  She has tried viscous supplementation injections.  Minimal relief.  She is also had a left knee arthroscopy with partial medial meniscectomy in 2017.  X-rays show bone-on-bone arthritis the medial compartment.  Reviewed the patient's most recent note from her orthopedist who is recommending total knee arthroplasty.    All other systems reviewed and are negative, other than those listed in the HPI.    Pertinent History  Do you have difficulty breathing or chest pain after walking up a flight of stairs: No  History of obstructive sleep apnea: No  Steroid use in the last 6 months: No  Frequent Aspirin/NSAID use: Yes: daily ASA   Prior Blood Transfusion: yes due to breast surgery from implant   Prior Blood Transfusion Reaction: No  If for some reason prior to, during or after the procedure, if it is medically indicated, would you be willing to have a blood transfusion?:  There is no transfusion refusal.    Current Outpatient Prescriptions   Medication Sig Dispense Refill     ascorbic acid, vitamin C, (ASCORBIC ACID WITH MANUEL HIPS) 500 MG tablet Take 500 mg by mouth daily.       aspirin 81 mg chewable tablet Chew 81 mg daily.       CALCIUM CITRATE/VITAMIN D3 (CITRACAL + D ORAL) Take 1 tablet by mouth 3 (three) times a day.       cinnamon bark (CINNAMON) 500 mg capsule Take 1,000 mg by mouth daily.       NON FORMULARY lionu juice seaweed 20mg daily       OMEGA 3,6,9 COMBINATION NO.7 ORAL Take 1,000 mg by mouth daily.       omega-3 fatty acids (FISH OIL) 500 mg cap Take 1 tablet by mouth daily.       PROCYAN OLIG/UBI/VIT A/HB#155 (PYCNOGENOL COMPLEX ORAL) Take 60 mg by mouth  "daily.       No current facility-administered medications for this visit.         Allergies   Allergen Reactions     Shellfish Containing Products        Patient Active Problem List   Diagnosis     Arthritis     History of breast cancer     History of urinary incontinence     Pure hypercholesterolemia      Osteopenia     Unexplained night sweats     Polyarthritis of hand     Dysuria     Primary osteoarthritis of left knee       Past Medical History:   Diagnosis Date     Breast CA (H)        Past Surgical History:   Procedure Laterality Date     HYSTERECTOMY       INCONTINENCE SURGERY       MASTECTOMY       MASTECTOMY         Social History     Social History     Marital status:      Spouse name: N/A     Number of children: N/A     Years of education: N/A     Occupational History     Not on file.     Social History Main Topics     Smoking status: Never Smoker     Smokeless tobacco: Never Used     Alcohol use Not on file     Drug use: Not on file     Sexual activity: Not on file     Other Topics Concern     Not on file     Social History Narrative       Patient Care Team:  Mor Chaidez MD as PCP - General (Family Medicine)  Susan L Lombardi, RN as RN, Care Manager  Auugst Arvizu MD as Physician (Hematology and Oncology)    Objective:     Vitals:    06/15/18 1109   BP: 110/70   Pulse: 68   Resp: 18   Temp: 98.1  F (36.7  C)   TempSrc: Oral   SpO2: 99%   Weight: 129 lb (58.5 kg)   Height: 5' 5.5\" (1.664 m)     Physical Exam:  Physical Exam   Constitutional: She is oriented to person, place, and time. She appears well-developed and well-nourished. No distress.   HENT:   Head: Normocephalic and atraumatic.   Right Ear: External ear normal.   Left Ear: External ear normal.   Mouth/Throat: Oropharynx is clear and moist. No oropharyngeal exudate.   Eyes: Conjunctivae and EOM are normal. Pupils are equal, round, and reactive to light. Left eye exhibits no discharge. No scleral icterus.   Neck: Normal range of " motion. Neck supple. No thyromegaly present.   Cardiovascular: Normal rate, regular rhythm and normal heart sounds.  Exam reveals no gallop and no friction rub.    No murmur heard.  Pulmonary/Chest: Effort normal and breath sounds normal. No respiratory distress. She has no wheezes.   Abdominal: Soft. She exhibits no distension and no mass. There is no tenderness. No hernia.   Musculoskeletal: Normal range of motion. She exhibits no edema.   Lymphadenopathy:     She has no cervical adenopathy.   Neurological: She is alert and oriented to person, place, and time. She has normal reflexes. She displays normal reflexes. No cranial nerve deficit.   Skin: Skin is warm. No rash noted.   Psychiatric: She has a normal mood and affect. Her behavior is normal. Judgment and thought content normal.     There are no Patient Instructions on file for this visit.    EKG:  NSR    Labs:  Recent Results (from the past 120 hour(s))   2(CBC w/o Differential)    Collection Time: 06/15/18 11:52 AM   Result Value Ref Range    WBC 5.0 4.0 - 11.0 thou/uL    RBC 4.15 3.80 - 5.40 mill/uL    Hemoglobin 13.2 12.0 - 16.0 g/dL    Hematocrit 38.3 35.0 - 47.0 %    MCV 92 80 - 100 fL    MCH 31.8 27.0 - 34.0 pg    MCHC 34.5 32.0 - 36.0 g/dL    RDW 11.2 11.0 - 14.5 %    Platelets 187 140 - 440 thou/uL    MPV 6.4 (L) 7.0 - 10.0 fL   Basic Metabolic Panel    Collection Time: 06/15/18 11:52 AM   Result Value Ref Range    Sodium 134 (L) 136 - 145 mmol/L    Potassium 4.3 3.5 - 5.0 mmol/L    Chloride 98 98 - 107 mmol/L    CO2 24 22 - 31 mmol/L    Anion Gap, Calculation 12 5 - 18 mmol/L    Glucose 86 70 - 125 mg/dL    Calcium 9.8 8.5 - 10.5 mg/dL    BUN 13 8 - 28 mg/dL    Creatinine 0.59 (L) 0.60 - 1.10 mg/dL    GFR MDRD Af Amer >60 >60 mL/min/1.73m2    GFR MDRD Non Af Amer >60 >60 mL/min/1.73m2       Immunization History   Administered Date(s) Administered     Influenza high dose, seasonal 10/12/2017     Electronically signed by Mor Chaidez MD  06/18/18 11:18 AM

## 2021-06-19 NOTE — PROGRESS NOTES
[facility] Hematology and Oncology Progress Note    Patient: Sharon L Schwab  MRN: [id number]  Date of Service: 07/24/2018        Reason for Visit    Chief Complaint   Patient presents with     HE Cancer     Breast Cancer       Assessment and Plan  No matching staging information was found for the patient.    ECOG Performance   ECOG Performance Status: 0     Distress Assessment  Distress Assessment Score: No distress    Pain  Currently in Pain: No/denies    #.  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  #.  Left breast invasive lobular carcinoma, stage I, ER positive in 2002.  Post left breast mastectomy and axillary lymph node dissection.  No adjuvant radiation or chemotherapy.  Completed 5 years of tamoxifen.  #.  Low bone density     There is no clinical evidence of breast cancer recurrence.  Screening mammogram was not indicated due to bilateral mastectomies.  Labs are reviewed and hemogram was fairly normal with mild normocytic anemia likely attributed to recent left knee arthroplasty.  Electrolytes were at her baseline.  However, hepatic enzyme levels were slightly elevated.  Along with that, CA 27-19 was noted to be 49 from 34 a year ago.  I explained to her that this serial markers are not specific however with her circumstances, we will would make a judgment to proceed with a CT scan to further evaluate for breast cancer recurrence.  She voiced understanding and she would like to proceed with that.      Plan:  -CT scan of chest abdomen pelvis.  -Follow-up with me after completion of the scan.    Problem List    1. Breast cancer (H)  HM1(CBC and Differential)    HM1 (CBC with Diff)      ______________________________________________________________________________  Diagnosis  1989- diagnosed with DCIS in the right breast by screening mammogram.  2002-left breast invasive lobular carcinoma, early stage.  No lymph no involvement.    Treatment to date  1989-right  radical mastectomy and axillary lymph node dissection.  2002-left breast radical mastectomy and axillary lymph node dissection.  No adjuvant chemotherapy or radiation.  Completed 5 years of tamoxifen.  2008-hysterectomy and bilateral oophorectomy.    History of Present Illness    Eva is here for routine follow-up.  In the last year, she has been doing fairly well.  No new medical issues.  She recently had a left total knee arthroplasty and recovery from that very well.  No bone pain.    Pain Status  Currently in Pain: No/denies    Review of Systems    Constitutional  Constitutional (WDL): Exceptions to WDL  Fatigue: None  Fever: None  Chills: None  Weight Gain: None  Weight Loss: None  Neurosensory  Neurosensory (WDL): All neurosensory elements are within defined limits  Eye   Eye Disorder (WDL): All eye disorder elements are within defined limits  Ear  Ear Disorder (WDL): All ear disorder elements are within defined limits  Cardiovascular  Cardiovascular (WDL): Exceptions to WDL  Palpitations: None  Edema: Yes  Edema Limbs: 5 - 10% inter-limb discrepancy in volume or circumference at point of greatest visible difference, swelling or obscuration of anatomic architecture on close inspection (Left leg, Left knee surgery 6/27/18)  Phlebitis: None  Superficial thrombophlebitis: None  Pulmonary  Respiratory (WDL): Within Defined Limits  Gastrointestinal  Gastrointestinal (WDL): Exceptions to WDL  Anorexia: None  Constipation: Occasional or intermittent symptoms, occasional use of stool softeners, laxatives, dietary modification, or enema (controls with drinking prune juice)  Diarrhea: None  Dysphagia: None  Esophagitis: None  Nausea: None  Pharyngitis: None  Vomiting: None  Dysgeusia: None  Dry Mouth: None  Genitourinary  Genitourinary (WDL): All genitourinary elements are within defined limits  Lymphatic  Lymph (WDL): All lymph disorder elements are within defined limits  Musculoskeletal and Connective  Tissue  Musculoskeletal and Connetive Tissue Disorders (WDL): All Musculoskeletal and Connetive Tissue Disorder elements are within defined limits  Integumentary  Integumentary (WDL): Exceptions to WDL (bruises easily-on  mg daily)  Alopecia: None  Rash Maculo-Papular: None  Pruritus: None  Urticaria: None  Palmar-Plantar Erythrodysesthesia Syndrome: None  Flushing: None  Patient Coping  Patient Coping: Accepting  Distress Assessment  Distress Assessment Score: No distress  Accompanied by  Accompanied by: Alone  Oral Chemo Adherence       Past History  Past Medical History:   Diagnosis Date     Breast CA (H)        Physical Exam    Recent Vitals 7/24/2018   Height -   Weight 130 lbs   BSA (m2) -   /71   Pulse 73   Temp 98.7   Temp src 1   SpO2 97   Some recent data might be hidden     General: alert, awake, not in acute distress  HEENT: Head: Normal, normocephalic, atraumatic.  Eye: Normal external eye, conjunctiva, lids cornea, MYRON.  Ears:  Non-tender.  Nose: Normal external nose, mucus membranes and septum.  Pharynx: Dental Hygiene adequate. Normal buccal mucosa. Normal pharynx.  Neck / Thyroid: Supple, no masses, nodes, nodules or enlargement.  Lymphatics: No abnormally enlarged lymph nodes.  Chest: Normal chest wall and respirations. Clear to auscultation.  Breasts: Bilateral mastectomy with implant placed.  No palpable masses.  No skin abnormalities.  Heart: S1 S2 RRR, no murmur.   Abdomen: abdomen is soft without significant tenderness, masses, organomegaly or guarding  Extremities: normal strength, tone, and muscle mass  Skin: normal. no rash or abnormalities  CNS: non focal.      Lab Results    Recent Results (from the past 168 hour(s))   Comprehensive Metabolic Panel   Result Value Ref Range    Sodium 131 (L) 136 - 145 mmol/L    Potassium 4.1 3.5 - 5.0 mmol/L    Chloride 96 (L) 98 - 107 mmol/L    CO2 26 22 - 31 mmol/L    Anion Gap, Calculation 9 5 - 18 mmol/L    Glucose 125 70 - 125 mg/dL     BUN 12 8 - 28 mg/dL    Creatinine 0.61 0.60 - 1.10 mg/dL    GFR MDRD Af Amer >60 >60 mL/min/1.73m2    GFR MDRD Non Af Amer >60 >60 mL/min/1.73m2    Bilirubin, Total 0.3 0.0 - 1.0 mg/dL    Calcium 9.5 8.5 - 10.5 mg/dL    Protein, Total 7.3 6.0 - 8.0 g/dL    Albumin 3.8 3.5 - 5.0 g/dL    Alkaline Phosphatase 101 45 - 120 U/L    AST 51 (H) 0 - 40 U/L    ALT 82 (H) 0 - 45 U/L   HM1 (CBC with Diff)   Result Value Ref Range    WBC 4.9 4.0 - 11.0 thou/uL    RBC 3.60 (L) 3.80 - 5.40 mill/uL    Hemoglobin 11.1 (L) 12.0 - 16.0 g/dL    Hematocrit 31.2 (L) 35.0 - 47.0 %    MCV 87 80 - 100 fL    MCH 30.8 27.0 - 34.0 pg    MCHC 35.6 32.0 - 36.0 g/dL    RDW 12.8 11.0 - 14.5 %    Platelets 224 140 - 440 thou/uL    MPV 8.7 8.5 - 12.5 fL    Neutrophils % 64 50 - 70 %    Lymphocytes % 20 20 - 40 %    Monocytes % 10 2 - 10 %    Eosinophils % 6 0 - 6 %    Basophils % 1 0 - 2 %    Neutrophils Absolute 3.1 2.0 - 7.7 thou/uL    Lymphocytes Absolute 1.0 0.8 - 4.4 thou/uL    Monocytes Absolute 0.5 0.0 - 0.9 thou/uL    Eosinophils Absolute 0.3 0.0 - 0.4 thou/uL    Basophils Absolute 0.0 0.0 - 0.2 thou/uL       Imaging    No results found.      Signed by: August Arvizu MD

## 2021-06-19 NOTE — PROGRESS NOTES
[facility] Hematology and Oncology Progress Note    Patient: Sharon L Schwab  MRN: [id number]  Date of Service: 8/9/2018        Reason for Visit    Chief Complaint   Patient presents with     HE Cancer     Breast Cancer       Assessment and Plan  No matching staging information was found for the patient.    ECOG Performance   ECOG Performance Status: 0     Distress Assessment  Distress Assessment Score: No distress    Pain  Currently in Pain: No/denies    #.  Several upper normal lymphadenopathy above and below the diaphragm.  #.  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  #.  Left breast invasive lobular carcinoma, stage I, ER positive in 2002.  Post left breast mastectomy and axillary lymph node dissection.  No adjuvant radiation or chemotherapy.  Completed 5 years of tamoxifen.  #.  Low bone density     I reviewed CT scan result and images were reviewed with the patient.  She had slightly elevated CA-27-29 which is new since a year ago.  The CT scan images did not show no clear evidence of breast cancer recurrence however, there are several upper normal bilateral axillary lymph nodes, retroperitoneal and pelvic adenopathy, left inguinal adenopathy without any focal liver lesion or splenomegaly.  I explained to her that I may not be able to tell exactly the lymph nodes are from breast cancer or a separate new pathology of lymphoproliferative disease.  I do not have a prior scan to compare.  We discussed about potential needle biopsy of the lymph node or clinical surveillance on the lymph nodes.  Currently lymph nodes are nonpalpable.  She feels that she is feeling well and does not want to do any more aggressive at this point, rather clinical surveillance.   Follow-up with me in 6 months with labs and exam.    Problem List    1. History of breast cancer     2. Elevated LFTs     3. Lymphadenopathy         ______________________________________________________________________________  Diagnosis  1989- diagnosed with DCIS in the right breast by screening mammogram.  2002-left breast invasive lobular carcinoma, early stage.  No lymph no involvement.    Treatment to date  1989-right radical mastectomy and axillary lymph node dissection.  2002-left breast radical mastectomy and axillary lymph node dissection.  No adjuvant chemotherapy or radiation.  Completed 5 years of tamoxifen.  2008-hysterectomy and bilateral oophorectomy.    History of Present Illness    Eva is here for routine follow-up.  In the last year, she has been doing fairly well.  No new medical issues.  She recently had a left total knee arthroplasty and recovery from that very well.  No bone pain.    Pain Status  Currently in Pain: No/denies    Review of Systems    Constitutional  Constitutional (WDL): Exceptions to WDL  Fatigue: None  Fever: None  Chills: None  Weight Gain: None  Weight Loss: None  Neurosensory  Neurosensory (WDL): All neurosensory elements are within defined limits  Eye   Eye Disorder (WDL): All eye disorder elements are within defined limits  Ear  Ear Disorder (WDL): All ear disorder elements are within defined limits  Cardiovascular  Cardiovascular (WDL): Exceptions to WDL  Palpitations: None  Edema: Yes  Edema Limbs: 5 - 10% inter-limb discrepancy in volume or circumference at point of greatest visible difference, swelling or obscuration of anatomic architecture on close inspection (Left leg)  Phlebitis: None  Superficial thrombophlebitis: None  Pulmonary  Respiratory (WDL): Within Defined Limits  Gastrointestinal  Gastrointestinal (WDL): Exceptions to WDL  Anorexia: None  Constipation: Occasional or intermittent symptoms, occasional use of stool softeners, laxatives, dietary modification, or enema (prune juice helps)  Diarrhea: None  Dysphagia: None  Esophagitis: None  Nausea: None  Pharyngitis: None  Vomiting: None  Dysgeusia:  "None  Dry Mouth: None  Genitourinary  Genitourinary (WDL): All genitourinary elements are within defined limits  Lymphatic  Lymph (WDL): All lymph disorder elements are within defined limits  Musculoskeletal and Connective Tissue  Musculoskeletal and Connetive Tissue Disorders (WDL): All Musculoskeletal and Connetive Tissue Disorder elements are within defined limits  Integumentary  Integumentary (WDL): Exceptions to WDL (bruises easily. bilat forearms , L leg)  Alopecia: None  Rash Maculo-Papular: None  Pruritus: None  Urticaria: None  Palmar-Plantar Erythrodysesthesia Syndrome: None  Flushing: None  Patient Coping  Patient Coping: Accepting  Distress Assessment  Distress Assessment Score: No distress  Accompanied by  Accompanied by: Alone  Oral Chemo Adherence       Past History  Past Medical History:   Diagnosis Date     Breast CA (H)        Physical Exam    Recent Vitals 8/9/2018   Height 5' 5.5\"   Weight 129 lbs 6 oz   BSA (m2) 1.65 m2   /67   Pulse 77   Temp -   Temp src -   SpO2 95   Some recent data might be hidden     General: alert, awake, not in acute distress  HEENT: Head: Normal, normocephalic, atraumatic.  Eye: Normal external eye, conjunctiva, lids cornea, MYRON.  Ears:  Non-tender.  Nose: Normal external nose, mucus membranes and septum.  Pharynx: Dental Hygiene adequate. Normal buccal mucosa. Normal pharynx.  Neck / Thyroid: Supple, no masses, nodes, nodules or enlargement.  Lymphatics: No abnormally enlarged lymph nodes.  Chest: Normal chest wall and respirations. Clear to auscultation.  Breasts: Bilateral mastectomy with implant placed.  No palpable masses.  No skin abnormalities.  Heart: S1 S2 RRR, no murmur.   Abdomen: abdomen is soft without significant tenderness, masses, organomegaly or guarding  Extremities: normal strength, tone, and muscle mass  Skin: normal. no rash or abnormalities  CNS: non focal.      Lab Results    No results found for this or any previous visit (from the past " 168 hour(s)).    Imaging    Ct Chest Abdomen Pelvis With Oral With Iv Cont    Result Date: 8/8/2018  CT CHEST, ABDOMEN, AND PELVIS 8/8/2018 9:58 AM      INDICATION: H/o lobular carcinoma of the breast in 2002, hepatic enzyme elevation. TECHNIQUE: CT chest, abdomen, and pelvis. Dose reduction techniques were used. IV CONTRAST: Iohexol (Omni) 100 mL. COMPARISON: None. FINDINGS: CHEST: Borderline enlarged 9 x 11 mm right axillary node (series 2, image 30). A few other upper normal bilateral axillary lymph nodes. No mediastinal or hilar adenopathy. 2 mm peripheral right apex nodule (series 2, image 21). 1 mm anterior right lower lobe nodule (image 74). No effusions. Mild coronary and aortic calcifications. Nonaneurysmal aorta.  ABDOMEN: Mild retroperitoneal adenopathy. Example left retroperitoneal node measures 12 x 13 mm (series 2, image 111). No focal liver lesion. Unremarkable gallbladder, pancreas, spleen, adrenals and kidneys. Left renal cyst. Nonaneurysmal aorta. No free fluid. PELVIS: Mild pelvic adenopathy along the bilateral common and external iliac arteries. Borderline left inguinal adenopathy. Example proximal left external iliac node measures 15 x 20 mm (series 2, image 136). Left inguinal node measures 11 x 12 mm (image  193). Grossly unremarkable bowel. Mild diffuse colonic stool. No free fluid. MUSCULOSKELETAL: Bilateral breast prosthetics. Multilevel degenerative changes spine. Lumbar spine curvature to the left. No focal bony lesion. Bony demineralization.     CONCLUSION: 1.  Upper normal bilateral axillary lymph nodes. Mild retroperitoneal and pelvic adenopathy. Borderline left inguinal adenopathy. Axillary nodes are indeterminate with history of bilateral breast malignancy. Retroperitoneal and pelvic adenopathy raises suspicion for neoplasm; metastatic disease or lymphoproliferative disease / lymphoma possible differentials. 2.  Few tiny pulmonary nodules are nonspecific. These could be incidental,  but recommend 3 month follow-up for stability or comparison to old imaging available. 3.  No focal liver lesion seen. 4.  Surgical changes breast prosthetics. NOTE: ABNORMAL REPORT THE DICTATION ABOVE DESCRIBES AN ABNORMALITY FOR WHICH FOLLOW-UP IS NEEDED.         Signed by: August Arvizu MD

## 2021-06-19 NOTE — PROGRESS NOTES
Navigator follow up letter mailed to Eva, along with a [facility] invite and card, reminding her she can call me with any needs.

## 2021-06-20 NOTE — LETTER
Letter by August Arvizu MD at      Author: August Arvizu MD Service: -- Author Type: --    Filed:  Encounter Date: 8/6/2020 Status: (Other)                   Office Hours: Monday - Friday 8:00 - 4:30PM    Sharon L Schwab  [address]  [place] [address]           August 6, 2020      Dear Eva:    It looks as though you are due to see Dr. Arvizu. If you would like to schedule this please call [phone]       Sincerely,        [facility]

## 2021-06-20 NOTE — LETTER
Letter by August Arvizu MD at      Author: August Arvizu MD Service: -- Author Type: --    Filed:  Encounter Date: 7/21/2020 Status: (Other)                   Office Hours: Monday - Friday 8:00 - 4:30PM    Sharon L Schwab  [address]  [facility] [address]           July 21, 2020      Dear Eva:    It looks as though you are due to see Dr. Arvizu in October. If you would like to schedule this please call [phone]       Sincerely,        [facility]

## 2021-06-21 NOTE — LETTER
Letter by Mor Chaidez MD at      Author: Mor Chaidez MD Service: -- Author Type: --    Filed:  Encounter Date: 5/14/2021 Status: (Other)         Sharon L Schwab  [address] [place] [address]             May 14, 2021         Dear Ms. Schwab,    Below are the results from your recent visit:    Resulted Orders   Basic Metabolic Panel   Result Value Ref Range    Sodium 133 (L) 136 - 145 mmol/L    Potassium 5.0 3.5 - 5.0 mmol/L    Chloride 96 (L) 98 - 107 mmol/L    CO2 27 22 - 31 mmol/L    Anion Gap, Calculation 10 5 - 18 mmol/L    Glucose 104 70 - 125 mg/dL    Calcium 9.3 8.5 - 10.5 mg/dL    BUN 18 8 - 28 mg/dL    Creatinine 0.70 0.60 - 1.10 mg/dL    GFR MDRD Af Amer >60 >60 mL/min/1.73m2    GFR MDRD Non Af Amer >60 >60 mL/min/1.73m2    Narrative    Fasting Glucose reference range is 70-99 mg/dL per  American Diabetes Association (ADA) guidelines.     Your laboratory testing shows that your potassium is slightly higher in comparison to previous measurements.  Fortunately, this suggests that moving from 20 mg to 40 mg on lisinopril will potentially cause electrolyte problems.  Given that most of your measurements were more or less close to the normal range and that the repeat measurement in clinic was in the target range, I think it is reasonable to continue 20 mg for now.  Please call with questions or contact us using [employer].    Sincerely,      Electronically signed by Mor Chaidez MD

## 2021-06-21 NOTE — LETTER
Letter by Mor Chaidez MD at      Author: Mor Chaidez MD Service: -- Author Type: --    Filed:  Encounter Date: 10/30/2020 Status: (Other)         Sharon L Schwab  [address] [place] [place] [address]             October 30, 2020         Dear Ms. Schwab,    Below are the results from your recent visit:    Resulted Orders   Basic Metabolic Panel   Result Value Ref Range    Sodium 135 (L) 136 - 145 mmol/L    Potassium 4.0 3.5 - 5.0 mmol/L    Chloride 99 98 - 107 mmol/L    CO2 24 22 - 31 mmol/L    Anion Gap, Calculation 12 5 - 18 mmol/L    Glucose 95 70 - 125 mg/dL    Calcium 9.6 8.5 - 10.5 mg/dL    BUN 15 8 - 28 mg/dL    Creatinine 0.66 0.60 - 1.10 mg/dL    GFR MDRD Af Amer >60 >60 mL/min/1.73m2    GFR MDRD Non Af Amer >60 >60 mL/min/1.73m2    Narrative    Fasting Glucose reference range is 70-99 mg/dL per  American Diabetes Association (ADA) guidelines.   ALT (SGPT)   Result Value Ref Range    ALT 46 (H) 0 - 45 U/L   HM2(CBC w/o Differential)   Result Value Ref Range    WBC 4.8 4.0 - 11.0 thou/uL    RBC 4.37 3.80 - 5.40 mill/uL    Hemoglobin 14.1 12.0 - 16.0 g/dL    Hematocrit 41.4 35.0 - 47.0 %    MCV 95 80 - 100 fL    MCH 32.3 27.0 - 34.0 pg    MCHC 34.1 32.0 - 36.0 g/dL    RDW 11.8 11.0 - 14.5 %    Platelets 194 140 - 440 thou/uL    MPV 6.5 (L) 7.0 - 10.0 fL   Glycosylated Hemoglobin A1c   Result Value Ref Range    Hemoglobin A1c 5.1 <=5.6 %      Comment:      Prediabetes:   HBA1c       5.7 to 6.4%        Diabetes:        HBA1c        >=6.5%   Patients with Hgb F >5%, total bilirubin >10.0 mg/dL, abnormal red cell turnover, severe renal or hepatic disease or malignancy should not have this A1C method used to diagnose or monitor diabetes.         All testing is stable in comparison to recent measurements.  We should plan to recheck in 1-2 years.    Please call with questions or contact us using [employer].    Sincerely,        Electronically signed by Mor Chaidez MD

## 2021-06-21 NOTE — LETTER
"Letter by Mor Chaidez MD at      Author: Mor Chaidez MD Service: -- Author Type: --    Filed:  Encounter Date: 12/8/2020 Status: (Other)         Sharon L Schwab  [address] [address]             December 8, 2020         Dear Ms. Schwab,    Below are the results from your recent visit:    Resulted Orders   Basic Metabolic Panel   Result Value Ref Range    Sodium 132 (L) 136 - 145 mmol/L    Potassium 3.8 3.5 - 5.0 mmol/L    Chloride 95 (L) 98 - 107 mmol/L    CO2 24 22 - 31 mmol/L    Anion Gap, Calculation 13 5 - 18 mmol/L    Glucose 123 70 - 125 mg/dL    Calcium 9.3 8.5 - 10.5 mg/dL    BUN 14 8 - 28 mg/dL    Creatinine 0.73 0.60 - 1.10 mg/dL    GFR MDRD Af Amer >60 >60 mL/min/1.73m2    GFR MDRD Non Af Amer >60 >60 mL/min/1.73m2    Narrative    Fasting Glucose reference range is 70-99 mg/dL per  American Diabetes Association (ADA) guidelines.   Surgical Pathology Exam   Result Value Ref Range    Case Report       Surgical Pathology Report                         Case: [id number]                                    Authorizing Provider:  Mor Chaidez MD      Collected:           12/03/2020 [facility]              Ordering Location:     [facility]   Received:            12/03/2020 [address] [place] [address]                                                                   Pathologist:           Colton Borges MD                                                        Specimen:    Chest                                                                                      Final Diagnosis       SKIN, DARK RAISED LESION, CHEST, SHAVE BIOPSY:     - SEBORRHEIC KERATOSIS    Microscopic Description       Microscopic examination performed, substantiating the above diagnosis.    Clinical Information       Clinical history: Dark raised lesion.  Growing. \"Chest\"  Reason for procedure: Determine pathology.    Gross Description       The specimen is received in " "formalin, labeled with the patient's name and \"chest.\" It consists of a tan skin shave biopsy measuring 0.5 x 0.3 cm. There is a slightly raised black lesion measuring 0.3 x 0.2 cm and involving the peripheral edge of the biopsy. The base of the biopsy is inked black. Bisected, TE-1C  REKHAW:zion Grier CPT: 16299  ICD-10: L82.1     Result Flag        Comment:      SPECIMEN PROCESSING:    All histology slide preparation and stains; and cytology slide preparation, staining, and cytotechnologist screening done at [facility] are performed at , [address] [place] [address], [id number], with final interpretation, frozen section analysis, and cytology adequacy assessment at indicated laboratory.         The skin biopsy came back benign.  It was a seborrheic keratoses which is an age-related lesion with no potential to become a malignancy/cancer.    Blood work: your sodium has declined somewhat in comparison to the most recent measurement.  The last few measurements have been in the low range of normal.  This can sometimes be age-related.  It also can be medication related (lisinopril).  I suggest that we recheck in 2-3 months and see if there continues to be a trend before making any additional changes.  If the number remains low at that time, we can talk about whether or not you still need to be on lisinopril, in particular given that your most recent blood pressure was well controlled.  T    Please call with questions or contact us using [employer].    Sincerely,        Electronically signed by Mor Chaidez MD       "

## 2021-06-23 NOTE — PROGRESS NOTES
[facility] Hematology and Oncology Progress Note    Patient: Sharon L Schwab  MRN: [id number]  Date of Service:2/6/2019      Reason for Visit    Chief Complaint   Patient presents with     HE Cancer     Breast Cancer       Assessment and Plan  Cancer Staging  No matching staging information was found for the patient.    ECOG Performance   ECOG Performance Status: 0     Distress Assessment  Distress Assessment Score: No distress    Pain  Currently in Pain: No/denies  Pain Score (Initial OR Reassessment): No/Denies Pain  Location: muscles from shoveling    #.  Several upper normal lymphadenopathy above and below the diaphragm.  #.  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  #.  Left breast invasive lobular carcinoma, stage I, ER positive in 2002.  Post left breast mastectomy and axillary lymph node dissection.  No adjuvant radiation or chemotherapy.  Completed 5 years of tamoxifen.  #.  Low bone density  #.  Several borderline lymphadenopathy above and below the diaphragm-found incidentally in August 2018     She is clinically well without any evidence of breast cancer recurrence. CA 27-29 is in normal range now, which is reassuring. There are no clinically palpable enlarged lymphadenopathy no splenomegaly.  No clear B symptoms.  Hemogram is unremarkable.    Follow-up with me in 8 months with labs and exam.  Going forward, we would plan to follow-up once a year in fall (to avoid visits in winter).    Problem List    1. History of breast cancer        ______________________________________________________________________________  Diagnosis  1989- diagnosed with DCIS in the right breast by screening mammogram.  2002-left breast invasive lobular carcinoma, early stage.  No lymph no involvement.    Treatment to date  1989-right radical mastectomy and axillary lymph node dissection.  2002-left breast radical mastectomy and axillary lymph node dissection.  No adjuvant  "chemotherapy or radiation.  Completed 5 years of tamoxifen.  2008-hysterectomy and bilateral oophorectomy.    History of Present Illness    Eva is here for routine follow-up.   He is overall doing well.  She has gained about 2 pounds.  She has tolerable hot flashes.  Denies weight loss, drenching night sweats or profound fatigue.    Pain Status  Currently in Pain: No/denies    Review of Systems    Constitutional  Constitutional (WDL): Exceptions to WDL  Fatigue: None  Fever: None  Chills: None  Weight Gain: 5 - <10% from baseline(8/9/18 #2)  Weight Loss: None  Neurosensory  Neurosensory (WDL): All neurosensory elements are within defined limits  Eye   Eye Disorder (WDL): All eye disorder elements are within defined limits(yearly eye exam next 2/13/19-wears glasses)  Ear  Ear Disorder (WDL): All ear disorder elements are within defined limits  Cardiovascular  Cardiovascular (WDL): All cardiovascular elements are within defined limits  Edema: No(dependent edema.  L>R)  Pulmonary  Respiratory (WDL): Within Defined Limits  Gastrointestinal  Gastrointestinal (WDL): Exceptions to WDL  Anorexia: Loss of appetite without alteration in eating habits(\"so-so\")  Constipation: Occasional or intermittent symptoms, occasional use of stool softeners, laxatives, dietary modification, or enema(prune juice controls)  Diarrhea: None  Dysphagia: None  Esophagitis: None  Nausea: None  Pharyngitis: None  Vomiting: None  Dysgeusia: None  Genitourinary  Genitourinary (WDL): All genitourinary elements are within defined limits  Lymphatic  Lymph (WDL): All lymph disorder elements are within defined limits  Musculoskeletal and Connective Tissue  Musculoskeletal and Connetive Tissue Disorders (WDL): Exceptions to WDL  Arthralgia: None  Bone Pain: None  Muscle Weakness : None  Myalgia: Mild pain(from shoveling)  Integumentary  Integumentary (WDL): All integumentary elements are within defined limits  Patient Coping  Patient Coping: " Accepting;Open/discussion  Distress Assessment  Distress Assessment Score: No distress  Accompanied by  Accompanied by: Alone  Oral Chemo Adherence       Past History  Past Medical History:   Diagnosis Date     Breast CA (H)        Physical Exam    Recent Vitals 2/6/2019   Height -   Weight 131 lbs 14 oz   BSA (m2) 1.66 m2   /70   Pulse 77   Temp 97.9   Temp src 1   SpO2 97   Some recent data might be hidden     General: alert, awake, not in acute distress  HEENT: Head: Normal, normocephalic, atraumatic.  Eye: Normal external eye, conjunctiva, lids cornea, MYRON.  Ears:  Non-tender.  Nose: Normal external nose, mucus membranes and septum.  Pharynx: Dental Hygiene adequate. Normal buccal mucosa. Normal pharynx.  Neck / Thyroid: Supple, no masses, nodes, nodules or enlargement.  Lymphatics: No abnormally enlarged lymph nodes.  Chest: Normal chest wall and respirations. Clear to auscultation.  Breasts: Bilateral mastectomy with implant placed.  No palpable masses.  No skin abnormalities.  Heart: S1 S2 RRR, no murmur.   Abdomen: abdomen is soft without significant tenderness, masses, organomegaly or guarding  Extremities: normal strength, tone, and muscle mass  Skin: normal. no rash or abnormalities  CNS: non focal.      Lab Results    Recent Results (from the past 168 hour(s))   Comprehensive Metabolic Panel   Result Value Ref Range    Sodium 135 (L) 136 - 145 mmol/L    Potassium 3.9 3.5 - 5.0 mmol/L    Chloride 99 98 - 107 mmol/L    CO2 23 22 - 31 mmol/L    Anion Gap, Calculation 13 5 - 18 mmol/L    Glucose 114 70 - 125 mg/dL    BUN 14 8 - 28 mg/dL    Creatinine 0.65 0.60 - 1.10 mg/dL    GFR MDRD Af Amer >60 >60 mL/min/1.73m2    GFR MDRD Non Af Amer >60 >60 mL/min/1.73m2    Bilirubin, Total 0.6 0.0 - 1.0 mg/dL    Calcium 9.4 8.5 - 10.5 mg/dL    Protein, Total 7.5 6.0 - 8.0 g/dL    Albumin 4.2 3.5 - 5.0 g/dL    Alkaline Phosphatase 67 45 - 120 U/L    AST 35 0 - 40 U/L    ALT 33 0 - 45 U/L   Lactate Dehydrogenase  (LDH)   Result Value Ref Range    LD (LDH) 273 (H) 125 - 220 U/L   HM1 (CBC with Diff)   Result Value Ref Range    WBC 5.4 4.0 - 11.0 thou/uL    RBC 3.97 3.80 - 5.40 mill/uL    Hemoglobin 12.4 12.0 - 16.0 g/dL    Hematocrit 34.2 (L) 35.0 - 47.0 %    MCV 86 80 - 100 fL    MCH 31.2 27.0 - 34.0 pg    MCHC 36.3 (H) 32.0 - 36.0 g/dL    RDW 11.8 11.0 - 14.5 %    Platelets 172 140 - 440 thou/uL    MPV 8.8 8.5 - 12.5 fL    Neutrophils % 75 (H) 50 - 70 %    Lymphocytes % 14 (L) 20 - 40 %    Monocytes % 10 2 - 10 %    Eosinophils % 0 0 - 6 %    Basophils % 0 0 - 2 %    Neutrophils Absolute 4.1 2.0 - 7.7 thou/uL    Lymphocytes Absolute 0.8 0.8 - 4.4 thou/uL    Monocytes Absolute 0.5 0.0 - 0.9 thou/uL    Eosinophils Absolute 0.0 0.0 - 0.4 thou/uL    Basophils Absolute 0.0 0.0 - 0.2 thou/uL       Imaging    No results found.    TT: 40 minutes and more than 50% was spent on coordination and counseling of care.    Signed by: August Arvizu MD

## 2021-06-25 NOTE — TELEPHONE ENCOUNTER
Reason for Call:  Medication or medication refill:  lisinopriL (PRINIVIL,ZESTRIL) 20 MG tablet 90 tablet         Do you use a [facility]?  Name of the pharmacy and phone number for the current request: [facility]    Name of the medication requested:   lisinopriL (PRINIVIL,ZESTRIL) 20 MG tablet 90 tablet         Other request:last seen 5/13/21, patient states the 20mg is working well for her. Pharmacy said no refills.    Can we leave a detailed message on this number? No call back needed    Phone number patient can be reached at: Home number on file [phone] (home)    Best Time:     Call taken on 6/9/2021 at 9:40 AM by Sridevi Zaman

## 2021-06-28 ENCOUNTER — OFFICE VISIT - HEALTHEAST (OUTPATIENT)
Dept: FAMILY MEDICINE | Facility: CLINIC | Age: 76
End: 2021-06-28

## 2021-06-28 DIAGNOSIS — J06.9 UPPER RESPIRATORY TRACT INFECTION, UNSPECIFIED TYPE: ICD-10-CM

## 2021-06-28 DIAGNOSIS — H65.92 FLUID LEVEL BEHIND TYMPANIC MEMBRANE OF LEFT EAR: ICD-10-CM

## 2021-06-28 RX ORDER — BENZONATATE 100 MG/1
100 CAPSULE ORAL 3 TIMES DAILY PRN
Qty: 30 CAPSULE | Refills: 0 | Status: SHIPPED | OUTPATIENT
Start: 2021-06-28 | End: 2022-02-14

## 2021-06-28 NOTE — ASSESSMENT & PLAN NOTE
Given the recent context of upper respiratory infection, presumably this is eustachian tube dysfunction.  She will try nasal steroid.  If the sensation of plugging continues, I will refer her to ENT.  She states that she had some sense of plugging even after removal of cerumen last week.  Unclear if this is chronic or acute.   -In terms of cough almost completely resolved.  I recommended that he take Tessalon Perles only as needed.  Small refill dispensed at the pharmacy.  Anticipate complete resolution of symptoms in the next few days.

## 2021-07-03 NOTE — ADDENDUM NOTE
Addendum Note by Mor Mccarthy MD at 3/9/2017  5:33 AM     Author: Mor Mccarthy MD Service: -- Author Type: Physician    Filed: 3/9/2017  5:33 AM Encounter Date: 3/2/2017 Status: Signed    : Mor Mccarthy MD (Physician)    Addended by: MOR MCCARTHY on: 3/9/2017 05:33 AM        Modules accepted: Orders

## 2021-07-05 PROBLEM — H65.92 FLUID LEVEL BEHIND TYMPANIC MEMBRANE OF LEFT EAR: Status: ACTIVE | Noted: 2021-06-28

## 2021-07-06 VITALS
DIASTOLIC BLOOD PRESSURE: 76 MMHG | RESPIRATION RATE: 18 BRPM | SYSTOLIC BLOOD PRESSURE: 130 MMHG | HEART RATE: 64 BPM | BODY MASS INDEX: 24.59 KG/M2 | WEIGHT: 141 LBS | OXYGEN SATURATION: 97 % | TEMPERATURE: 98.2 F

## 2021-07-07 NOTE — PROGRESS NOTES
"Assessment/Plan:    Fluid level behind tympanic membrane of left ear  Given the recent context of upper respiratory infection, presumably this is eustachian tube dysfunction.  She will try nasal steroid.  If the sensation of plugging continues, I will refer her to ENT.  She states that she had some sense of plugging even after removal of cerumen last week.  Unclear if this is chronic or acute.   -In terms of cough almost completely resolved.  I recommended that he take Tessalon Perles only as needed.  Small refill dispensed at the pharmacy.  Anticipate complete resolution of symptoms in the next few days.     Return in about 4 weeks (around 7/26/2021) for recheck if not improving.    Mor Chaidez MD  _______________________________    Chief Complaint   Patient presents with     Follow-up     06/21/2021 ( pt continues to have cough)      Cerumen Impaction     left ear      Subjective: Sharon L Schwab is a 75 y.o. year old female who I have seen in clinic before who presents with the following acute complaint(s):    cough:   - mostly resolved.  She has been taking tessalon perles. Voice has recovered.  No fevers.  \"I feel good.\" Able to take of horses.     - ear: still puglled.     ROS: Complete review of systems obtained.  Pertinent items are listed above.     The following portions of the patient's history were reviewed and updated as appropriate: allergies, current medications, past medical history and problem list.     Objective:   /76 (Patient Site: Left Arm, Patient Position: Sitting, Cuff Size: Adult Regular)   Pulse 64   Temp 98.2  F (36.8  C) (Oral)   Resp 18   Wt 141 lb (64 kg)   LMP  (LMP Unknown)   SpO2 97% Comment: room air  Breastfeeding No   BMI 24.59 kg/m    General: No acute distress  Ears: The right tympanic membrane is not well visualized.  There is crusted flakes of cerumen.  No pain with manipulation of the pinna.  The left TM is well visualized and there appears to be a middle " ear effusion.  No mobility with insufflation.  The external auditory canal is without abnormalities.    No results found for this or any previous visit (from the past 24 hour(s)).  No results found.    This note has been dictated using voice recognition software. Any grammatical or context distortions are unintentional and inherent to the software

## 2021-08-18 ENCOUNTER — TRANSFERRED RECORDS (OUTPATIENT)
Dept: HEALTH INFORMATION MANAGEMENT | Facility: CLINIC | Age: 76
End: 2021-08-18

## 2021-10-13 ENCOUNTER — ONCOLOGY VISIT (OUTPATIENT)
Dept: ONCOLOGY | Facility: CLINIC | Age: 76
End: 2021-10-13
Attending: INTERNAL MEDICINE
Payer: MEDICARE

## 2021-10-13 VITALS
OXYGEN SATURATION: 97 % | RESPIRATION RATE: 16 BRPM | SYSTOLIC BLOOD PRESSURE: 175 MMHG | DIASTOLIC BLOOD PRESSURE: 90 MMHG | HEART RATE: 72 BPM | TEMPERATURE: 98.5 F | BODY MASS INDEX: 23.91 KG/M2 | WEIGHT: 137.1 LBS

## 2021-10-13 DIAGNOSIS — R59.1 LYMPHADENOPATHY: ICD-10-CM

## 2021-10-13 DIAGNOSIS — I10 BENIGN ESSENTIAL HYPERTENSION: ICD-10-CM

## 2021-10-13 DIAGNOSIS — Z17.0 MALIGNANT NEOPLASM OF OVERLAPPING SITES OF LEFT BREAST IN FEMALE, ESTROGEN RECEPTOR POSITIVE (H): Primary | ICD-10-CM

## 2021-10-13 DIAGNOSIS — C50.812 MALIGNANT NEOPLASM OF OVERLAPPING SITES OF LEFT BREAST IN FEMALE, ESTROGEN RECEPTOR POSITIVE (H): Primary | ICD-10-CM

## 2021-10-13 PROCEDURE — G0463 HOSPITAL OUTPT CLINIC VISIT: HCPCS

## 2021-10-13 PROCEDURE — 99214 OFFICE O/P EST MOD 30 MIN: CPT | Performed by: INTERNAL MEDICINE

## 2021-10-13 ASSESSMENT — PAIN SCALES - GENERAL: PAINLEVEL: NO PAIN (0)

## 2021-10-13 NOTE — LETTER
"    10/13/2021         RE: Sharon L Curran Schwab  [address]  [facility] [address]        Dear Colleague,    Thank you for referring your patient, Sharon L Curran Schwab, to the [facility]. Please see a copy of my visit note below.    Oncology Rooming Note    October 13, 2021 12:45 PM   Sharon L Curran Schwab is a 76 year old female who presents for:    Chief Complaint   Patient presents with     Oncology Clinic Visit     Initial Vitals: BP (!) 175/90   Pulse 72   Temp 98.5  F (36.9  C)   Resp 16   Wt 62.2 kg (137 lb 1.6 oz)   SpO2 97%   BMI 23.91 kg/m   Estimated body mass index is 23.91 kg/m  as calculated from the following:    Height as of 6/21/21: 1.613 m (5' 3.5\").    Weight as of this encounter: 62.2 kg (137 lb 1.6 oz). Body surface area is 1.67 meters squared.  No Pain (0) Comment: Data Unavailable   No LMP recorded.  Allergies reviewed: Yes  Medications reviewed: Yes    Medications: Medication refills not needed today.  Pharmacy name entered into [employer]: [id number] - [place], [place] - [address]    Clinical concerns: None       Gloria Medeiros LPN              [facility] Hematology and Oncology Progress Note    Patient: Sharon L Curran Schwab  MRN: [id number]  Date of Service: Oct 13, 2021         Reason for Visit    Chief Complaint   Patient presents with     Oncology Clinic Visit       Assessment and Plan    Cancer Staging  No matching staging information was found for the patient.    ECOG Performance    0 - Independent     Pain  Pain Score: No Pain (0)    #.  Right breast DCIS in 1989, hormone receptor positive.  #.  Left breast invasive lobular carcinoma in 2002, hormone receptor positive.   There is no clinical signs and symptoms tested for breast cancer recurrence.    I do not recommend additional blood work or imaging. She is feeling reassured.  Because of her personal history of breast cancer x2, she requested to continue with " annual clinical exam.   Follow-up clinical exam in 1 year.  We will request labs and imaging depending on symptoms only.    #.  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   She has several upper limit of normal size lymphadenopathy above and below the diaphragm incidentally found in 2018.  She is completely asymptomatic.  No clinically palpable lymphadenopathy today.  We will hold off reimaging or biopsy at this point due to lack of symptoms.   Continue clinical surveillance.    #.  Hypertension, uncontrolled.   Attributed to stress.  She has prior elevated readings.  She is advised to continue to follow-up with her primary care provider for high blood pressure management.    #.  Grievance from death of her    She reported that she is coping very well.  I offered referral to psychotherapy or any assistance she needs.  She does not feel she needed at this point.  She has very good support system with her family and friends.    Encounter Diagnoses:    Problem List Items Addressed This Visit        Oncology Diagnoses    Malignant neoplasm of overlapping sites of left breast in female, estrogen receptor positive (H) - Primary       Other    Lymphadenopathy    Benign essential hypertension             CC: Mor Chaidez MD   ______________________________________________________________________________  Diagnosis  1989- diagnosed with DCIS in the right breast by screening mammogram.  2002-left breast invasive lobular carcinoma, early stage.  No lymph node involvement.     Treatment to date  1989-right radical mastectomy and axillary lymph node dissection.  2002-left breast radical mastectomy and axillary lymph node dissection.  No adjuvant chemotherapy or radiation.  Completed 5 years of tamoxifen.  2008-hysterectomy and bilateral oophorectomy.    History of Present Illness    Ms. Sharon L Curran Schwab presented herself today for annual follow-up.  She does not have concern about breast  cancer recurrence.  No palpable masses.  Appetite is good.  She does not have bone pain.  She denies persistent headaches.  No fever or drenching sweats.  Her weight is stable.    She reported her  passed away last week and  later this week.  She is coping well.  She is also trying to distract by doing something.     Review of systems  Apart from describing in HPI, the remainder of comprehensive ROS was negative.    Past History    Past Medical History:   Diagnosis Date     Breast CA (H)        Past Surgical History:   Procedure Laterality Date     HYSTERECTOMY       INCONTINENCE SURGERY       MASTECTOMY       MASTECTOMY       REPLACEMENT TOTAL KNEE      left knee         Physical Exam    BP (!) 175/90   Pulse 72   Temp 98.5  F (36.9  C)   Resp 16   Wt 62.2 kg (137 lb 1.6 oz)   SpO2 97%   BMI 23.91 kg/m        General: alert, awake, not in acute distress  HEENT: Head: Normal, normocephalic, atraumatic.  Eye: Normal external eye, conjunctiva, lids cornea, MYRON.  Nose: Normal external nose, mucus membranes and septum.  Pharynx: Normal buccal mucosa. Normal pharynx.  Neck / Thyroid: Supple, no masses, nodes, nodules or enlargement.  Lymphatics: No abnormally enlarged lymph nodes.  Chest: Normal chest wall and respirations. Clear to auscultation.  Breasts: Reconstructed breasts with implants.  No palpable abnormalities.  Heart: S1 S2 RRR, no murmur.   Abdomen: abdomen is soft without significant tenderness, masses, organomegaly or guarding  Extremities: normal strength, tone, and muscle mass  Skin: normal. no rash or abnormalities  CNS: non focal.    Lab Results    No results found for this or any previous visit (from the past 168 hour(s)).    Imaging    No results found.    TT: 30 minutes: time consisted of preparing to see the patient (eg. review of tests/medical records)-5 minutes, obtaining an/or reviewing separately obtained history, focused examination, review of the diagnosis, review of  management plan (20 minutes), ordering medication, test or procedure, referring and communicating with other healthcare professionals, and documenting clinical information in the electronic or other health record (5 minutes).    Signed by: August Arvizu MD        Again, thank you for allowing me to participate in the care of your patient.        Sincerely,        August Arvizu MD

## 2021-10-13 NOTE — PROGRESS NOTES
"Oncology Rooming Note    October 13, 2021 12:45 PM   Sharon L Curran Schwab is a 76 year old female who presents for:    Chief Complaint   Patient presents with     Oncology Clinic Visit     Initial Vitals: BP (!) 175/90   Pulse 72   Temp 98.5  F (36.9  C)   Resp 16   Wt 62.2 kg (137 lb 1.6 oz)   SpO2 97%   BMI 23.91 kg/m   Estimated body mass index is 23.91 kg/m  as calculated from the following:    Height as of 6/21/21: 1.613 m (5' 3.5\").    Weight as of this encounter: 62.2 kg (137 lb 1.6 oz). Body surface area is 1.67 meters squared.  No Pain (0) Comment: Data Unavailable   No LMP recorded.  Allergies reviewed: Yes  Medications reviewed: Yes    Medications: Medication refills not needed today.  Pharmacy name entered into [employer]: [id number] [place] [address]    Clinical concerns: None       Gloria Medeiros LPN            "

## 2021-10-13 NOTE — PROGRESS NOTES
[facility] Hematology and Oncology Progress Note    Patient: Sharon L Curran Schwab  MRN: [id number]  Date of Service: Oct 13, 2021         Reason for Visit    Chief Complaint   Patient presents with     Oncology Clinic Visit       Assessment and Plan    Cancer Staging  No matching staging information was found for the patient.    ECOG Performance    0 - Independent     Pain  Pain Score: No Pain (0)    #.  Right breast DCIS in 1989, hormone receptor positive.  #.  Left breast invasive lobular carcinoma in 2002, hormone receptor positive.   There is no clinical signs and symptoms tested for breast cancer recurrence.    I do not recommend additional blood work or imaging. She is feeling reassured.  Because of her personal history of breast cancer x2, she requested to continue with annual clinical exam.   Follow-up clinical exam in 1 year.  We will request labs and imaging depending on symptoms only.    #.  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   She has several upper limit of normal size lymphadenopathy above and below the diaphragm incidentally found in 2018.  She is completely asymptomatic.  No clinically palpable lymphadenopathy today.  We will hold off reimaging or biopsy at this point due to lack of symptoms.   Continue clinical surveillance.    #.  Hypertension, uncontrolled.   Attributed to stress.  She has prior elevated readings.  She is advised to continue to follow-up with her primary care provider for high blood pressure management.    #.  Grievance from death of her    She reported that she is coping very well.  I offered referral to psychotherapy or any assistance she needs.  She does not feel she needed at this point.  She has very good support system with her family and friends.    Encounter Diagnoses:    Problem List Items Addressed This Visit        Oncology Diagnoses    Malignant neoplasm of overlapping sites of left breast in female, estrogen receptor positive  (H) - Primary       Other    Lymphadenopathy    Benign essential hypertension             CC: Mor Chaidez MD   ______________________________________________________________________________  Diagnosis  - diagnosed with DCIS in the right breast by screening mammogram.  -left breast invasive lobular carcinoma, early stage.  No lymph node involvement.     Treatment to date  -right radical mastectomy and axillary lymph node dissection.  -left breast radical mastectomy and axillary lymph node dissection.  No adjuvant chemotherapy or radiation.  Completed 5 years of tamoxifen.  -hysterectomy and bilateral oophorectomy.    History of Present Illness    Ms. Sharon L Curran Schwab presented herself today for annual follow-up.  She does not have concern about breast cancer recurrence.  No palpable masses.  Appetite is good.  She does not have bone pain.  She denies persistent headaches.  No fever or drenching sweats.  Her weight is stable.    She reported her  passed away last week and  later this week.  She is coping well.  She is also trying to distract by doing something.     Review of systems  Apart from describing in HPI, the remainder of comprehensive ROS was negative.    Past History    Past Medical History:   Diagnosis Date     Breast CA (H)        Past Surgical History:   Procedure Laterality Date     HYSTERECTOMY       INCONTINENCE SURGERY       MASTECTOMY       MASTECTOMY       REPLACEMENT TOTAL KNEE      left knee         Physical Exam    BP (!) 175/90   Pulse 72   Temp 98.5  F (36.9  C)   Resp 16   Wt 62.2 kg (137 lb 1.6 oz)   SpO2 97%   BMI 23.91 kg/m        General: alert, awake, not in acute distress  HEENT: Head: Normal, normocephalic, atraumatic.  Eye: Normal external eye, conjunctiva, lids cornea, MYRON.  Nose: Normal external nose, mucus membranes and septum.  Pharynx: Normal buccal mucosa. Normal pharynx.  Neck / Thyroid: Supple, no masses, nodes, nodules or  enlargement.  Lymphatics: No abnormally enlarged lymph nodes.  Chest: Normal chest wall and respirations. Clear to auscultation.  Breasts: Reconstructed breasts with implants.  No palpable abnormalities.  Heart: S1 S2 RRR, no murmur.   Abdomen: abdomen is soft without significant tenderness, masses, organomegaly or guarding  Extremities: normal strength, tone, and muscle mass  Skin: normal. no rash or abnormalities  CNS: non focal.    Lab Results    No results found for this or any previous visit (from the past 168 hour(s)).    Imaging    No results found.    TT: 30 minutes: time consisted of preparing to see the patient (eg. review of tests/medical records)-5 minutes, obtaining an/or reviewing separately obtained history, focused examination, review of the diagnosis, review of management plan (20 minutes), ordering medication, test or procedure, referring and communicating with other healthcare professionals, and documenting clinical information in the electronic or other health record (5 minutes).    Signed by: August Arvizu MD

## 2021-11-27 ENCOUNTER — TRANSFERRED RECORDS (OUTPATIENT)
Dept: HEALTH INFORMATION MANAGEMENT | Facility: CLINIC | Age: 76
End: 2021-11-27
Payer: MEDICARE

## 2021-12-08 ENCOUNTER — OFFICE VISIT (OUTPATIENT)
Dept: FAMILY MEDICINE | Facility: CLINIC | Age: 76
End: 2021-12-08
Payer: MEDICARE

## 2021-12-08 VITALS
DIASTOLIC BLOOD PRESSURE: 100 MMHG | WEIGHT: 138.5 LBS | OXYGEN SATURATION: 98 % | HEIGHT: 64 IN | HEART RATE: 100 BPM | BODY MASS INDEX: 23.64 KG/M2 | SYSTOLIC BLOOD PRESSURE: 174 MMHG

## 2021-12-08 DIAGNOSIS — I10 BENIGN ESSENTIAL HYPERTENSION: Primary | ICD-10-CM

## 2021-12-08 PROCEDURE — 99214 OFFICE O/P EST MOD 30 MIN: CPT | Mod: 25 | Performed by: FAMILY MEDICINE

## 2021-12-08 PROCEDURE — 91300 PR COVID VAC PFIZER DIL RECON 30 MCG/0.3 ML IM: CPT | Performed by: FAMILY MEDICINE

## 2021-12-08 PROCEDURE — 90662 IIV NO PRSV INCREASED AG IM: CPT | Performed by: FAMILY MEDICINE

## 2021-12-08 PROCEDURE — G0008 ADMIN INFLUENZA VIRUS VAC: HCPCS | Performed by: FAMILY MEDICINE

## 2021-12-08 PROCEDURE — 0004A PR COVID VAC PFIZER DIL RECON 30 MCG/0.3 ML IM: CPT | Performed by: FAMILY MEDICINE

## 2021-12-08 ASSESSMENT — MIFFLIN-ST. JEOR: SCORE: 1085.29

## 2021-12-08 NOTE — ASSESSMENT & PLAN NOTE
Blood pressure is elevated.  She had a ophthalmologic procedure brought 30 minutes ago and appears quite distressed.  Home nurse measurements over the past couple of months were 130s-140s.  Before making changes, I will have the patient return to clinic for an additional measurement.  At that time will check basic metabolic panel.  If we need to adjust her dose, will base decision on her electrolytes.  Both sodium and potassium have been at the extreme edges of abnormal.  Would consider adding a low-dose of a calcium channel blocker as additional therapy.

## 2021-12-08 NOTE — PATIENT INSTRUCTIONS

## 2021-12-08 NOTE — PROGRESS NOTES
"Assessment/Plan:    Benign essential hypertension  Blood pressure is elevated.  She had a ophthalmologic procedure brought 30 minutes ago and appears quite distressed.  Home nurse measurements over the past couple of months were 130s-140s.  Before making changes, I will have the patient return to clinic for an additional measurement.  At that time will check basic metabolic panel.  If we need to adjust her dose, will base decision on her electrolytes.  Both sodium and potassium have been at the extreme edges of abnormal.  Would consider adding a low-dose of a calcium channel blocker as additional therapy.     Return in about 1 week (around 12/15/2021) for Hypertension - Nurse Visit.    Mor Chaidez MD  _______________________________    Chief Complaint   Patient presents with     Blood Pressure Check     f/u     Imm/Inj     Flu and Pfizer COVID booster     Subjective: Sharon L Curran Schwab is a 76 year old year old female who I have seen in clinic before who presents with the following acute complaint(s):    BP visit:   - she had an eye injection ~30 minutes before the clinic visit.   - BP at home (nurse visit for ) was 140s with some variability.    -   last month.  Lengthy process of dying.  Pneumonia     Review of Systems   Constitutional:        Review of systems negative except as noted in the HPI.   All other systems reviewed and are negative.       Histories reviewed:                Objective:   BP (!) 174/100 (BP Location: Left arm, Patient Position: Sitting, Cuff Size: Adult Regular)   Pulse 100   Ht 1.613 m (5' 3.5\")   Wt 62.8 kg (138 lb 8 oz)   SpO2 98%   BMI 24.15 kg/m    Physical Exam  Nursing note reviewed.   Constitutional:       General: She is not in acute distress.     Appearance: Normal appearance. She is not ill-appearing.   HENT:      Head: Normocephalic and atraumatic.   Eyes:      Extraocular Movements: Extraocular movements intact.      Conjunctiva/sclera: " Conjunctivae normal.   Pulmonary:      Effort: Pulmonary effort is normal.   Neurological:      Mental Status: She is alert and oriented to person, place, and time.   Psychiatric:         Attention and Perception: Attention normal.         Mood and Affect: Mood normal.         Speech: Speech normal.         Thought Content: Thought content normal.         No results found for this or any previous visit (from the past 48 hour(s)).  No results found for this visit on 12/08/21.    This note has been dictated using voice recognition software. Any grammatical or context distortions are unintentional and inherent to the software

## 2021-12-22 ENCOUNTER — ALLIED HEALTH/NURSE VISIT (OUTPATIENT)
Dept: FAMILY MEDICINE | Facility: CLINIC | Age: 76
End: 2021-12-22
Payer: MEDICARE

## 2021-12-22 VITALS
HEART RATE: 87 BPM | OXYGEN SATURATION: 97 % | RESPIRATION RATE: 14 BRPM | SYSTOLIC BLOOD PRESSURE: 146 MMHG | DIASTOLIC BLOOD PRESSURE: 80 MMHG

## 2021-12-22 DIAGNOSIS — I10 BENIGN ESSENTIAL HYPERTENSION: Primary | ICD-10-CM

## 2021-12-22 PROCEDURE — 99207 PR NO CHARGE NURSE ONLY: CPT

## 2021-12-22 RX ORDER — MOXIFLOXACIN 5 MG/ML
SOLUTION/ DROPS OPHTHALMIC
COMMUNITY
Start: 2021-12-09 | End: 2022-02-14

## 2021-12-22 NOTE — PROGRESS NOTES
I met with Sharon L Curran Schwab at the request of Dr. Chaidez to recheck her blood pressure.  Blood pressure medications on the med list were reviewed with patient.    Patient has taken all medications as per usual regimen: Yes  Patient reports tolerating them without any issues or concerns: Yes    Vitals:    12/22/21 0917   BP: (!) 146/80   BP Location: Left arm   Patient Position: Sitting   Cuff Size: Adult Regular   Pulse: 87   Resp: 14   SpO2: 97%        Blood pressure was taken, previous encounter was reviewed, recorded blood pressure below 140/90.  Patient was discharged and the note will be sent to the provider for final review.

## 2021-12-22 NOTE — Clinical Note
Please call patient.  Let her know I sent an additional medicine to pharmacy.  I would like to see her with a clinic visit in 2-3 weeks.

## 2021-12-23 ENCOUNTER — TELEPHONE (OUTPATIENT)
Dept: FAMILY MEDICINE | Facility: CLINIC | Age: 76
End: 2021-12-23
Payer: MEDICARE

## 2021-12-23 RX ORDER — HYDROCHLOROTHIAZIDE 12.5 MG/1
12.5 TABLET ORAL DAILY
Qty: 30 TABLET | Refills: 1 | Status: SHIPPED | OUTPATIENT
Start: 2021-12-23 | End: 2022-01-14

## 2021-12-23 NOTE — ASSESSMENT & PLAN NOTE
Blood pressure remains elevated.  We will add 12.5 mg of hydrochlorothiazide given previous history of elevated potassium.  Recheck in 2 weeks.  Limited dispense of hydrochlorothiazide sent to pharmacy.

## 2021-12-23 NOTE — TELEPHONE ENCOUNTER
See note in encounter.  Not sure if message got sent.  Please let patient know medicine is at pharmacy.  Please help her schedule a clinic visit with me in 2-3 weeks.

## 2021-12-23 NOTE — TELEPHONE ENCOUNTER
Pt notified of message below and appointment has been made    Kerrie Bhatia LPN  12/23/2021  9:03 AM

## 2021-12-23 NOTE — PROGRESS NOTES
Benign essential hypertension  Blood pressure remains elevated.  We will add 12.5 mg of hydrochlorothiazide given previous history of elevated potassium.  Recheck in 2 weeks.  Limited dispense of hydrochlorothiazide sent to pharmacy.

## 2022-01-10 ENCOUNTER — TRANSFERRED RECORDS (OUTPATIENT)
Dept: HEALTH INFORMATION MANAGEMENT | Facility: CLINIC | Age: 77
End: 2022-01-10
Payer: MEDICARE

## 2022-01-11 ENCOUNTER — ALLIED HEALTH/NURSE VISIT (OUTPATIENT)
Dept: FAMILY MEDICINE | Facility: CLINIC | Age: 77
End: 2022-01-11
Payer: MEDICARE

## 2022-01-11 VITALS — DIASTOLIC BLOOD PRESSURE: 72 MMHG | SYSTOLIC BLOOD PRESSURE: 146 MMHG

## 2022-01-11 DIAGNOSIS — I10 BENIGN ESSENTIAL HYPERTENSION: Primary | ICD-10-CM

## 2022-01-11 PROCEDURE — 99207 PR NO CHARGE NURSE ONLY: CPT

## 2022-01-11 NOTE — PROGRESS NOTES
I met with Sharon L Curran Schwab at the request of Dr. Hemphill to recheck her blood pressure.  Blood pressure medications on the med list were reviewed with patient.    Patient has taken all medications as per usual regimen: Yes  Patient reports tolerating them without any issues or concerns: Yes    Vitals:    01/11/22 0853 01/11/22 0903   BP: (!) 150/72 (!) 146/72   BP Location: Left arm Left arm   Patient Position: Sitting Sitting   Cuff Size: Adult Regular Adult Regular       Blood pressure was taken, previous encounter was reviewed, Will notify Dr. Hemphill and will call with any new instructions.

## 2022-01-14 RX ORDER — HYDROCHLOROTHIAZIDE 25 MG/1
25 TABLET ORAL DAILY
Qty: 90 TABLET | Refills: 1 | Status: SHIPPED | OUTPATIENT
Start: 2022-01-14 | End: 2022-02-24

## 2022-01-14 NOTE — PROGRESS NOTES
Benign essential hypertension  Blood pressure remains elevated. We will further titrate hydrochlorothiazide. Antihypertensive regimen at this time is as follows:  - Lisinopril 20 mg daily.  - Hydrochlorothiazide 25 mg- initial prescription sent to pharmacy.     She and I should do a face-to-face blood pressure check in 1 month.

## 2022-01-14 NOTE — ASSESSMENT & PLAN NOTE
Blood pressure remains elevated. We will further titrate hydrochlorothiazide. Antihypertensive regimen at this time is as follows:  - Lisinopril 20 mg daily.  - Hydrochlorothiazide 25 mg- initial prescription sent to pharmacy.     She and I should do a face-to-face blood pressure check in 1 month.

## 2022-02-14 ENCOUNTER — OFFICE VISIT (OUTPATIENT)
Dept: FAMILY MEDICINE | Facility: CLINIC | Age: 77
End: 2022-02-14
Payer: MEDICARE

## 2022-02-14 VITALS
DIASTOLIC BLOOD PRESSURE: 96 MMHG | WEIGHT: 137.06 LBS | TEMPERATURE: 97.6 F | RESPIRATION RATE: 16 BRPM | HEIGHT: 64 IN | BODY MASS INDEX: 23.4 KG/M2 | SYSTOLIC BLOOD PRESSURE: 182 MMHG | HEART RATE: 76 BPM

## 2022-02-14 DIAGNOSIS — I10 BENIGN ESSENTIAL HYPERTENSION: Primary | ICD-10-CM

## 2022-02-14 PROCEDURE — 99213 OFFICE O/P EST LOW 20 MIN: CPT | Performed by: FAMILY MEDICINE

## 2022-02-14 ASSESSMENT — MIFFLIN-ST. JEOR: SCORE: 1086.71

## 2022-02-14 NOTE — PROGRESS NOTES
"Assessment/Plan:    Benign essential hypertension  Blood pressure at home has consistently been in the 140s.  No lightheadedness or dizziness.  She feels well.  Today, she comes directly from an appointment next door in which she had a painful injection into her eyeball.  We will use her ambulatory home measurements to guide therapy.  I think she is at goal.  We will check a basic metabolic panel (patient declined to have this drawn today given her discomfort in her eye but will return in the near future) to evaluate electrolyte disturbances given the recent changes in her medications.  -Continue lisinopril 20 mg  -Continue hydrochlorothiazide 25 mg    Return in about 6 months (around 8/14/2022) for Annual exam, Hypertension (Blood Pressure).    Mor Chaidez MD  _______________________________    Chief Complaint   Patient presents with     Hypertension     Subjective: Sharon L Curran Schwab is a 76 year old year old female who returns to clinic for the following chronic complaints/concerns:     BP:   - she was injected into her eye this morning.  Painful.     - at home her BP measurements have been in the 140s.  \"For me that is good.\"     -No lightheadedness.  No dizziness.  She feels well.  She has no concerns of side effects with her current regimen.    Review of Systems   Constitutional:        Review of systems negative except as noted in the HPI.   All other systems reviewed and are negative.      Reviewed history: Tobacco  Allergies  Meds  Problems  Med Hx  Surg Hx  Fam Hx       Objective:    height is 1.626 m (5' 4\") and weight is 62.2 kg (137 lb 1 oz). Her oral temperature is 97.6  F (36.4  C). Her blood pressure is 182/96 (abnormal) and her pulse is 76. Her respiration is 16.   Physical Exam  Nursing note reviewed.   Constitutional:       General: She is in acute distress.      Appearance: Normal appearance. She is not ill-appearing.   HENT:      Head: Normocephalic and atraumatic.   Eyes:      " Extraocular Movements: Extraocular movements intact.      Conjunctiva/sclera: Conjunctivae normal.   Pulmonary:      Effort: Pulmonary effort is normal.   Neurological:      Mental Status: She is alert and oriented to person, place, and time.   Psychiatric:         Attention and Perception: Attention normal.         Mood and Affect: Mood normal.         Speech: Speech normal.         Thought Content: Thought content normal.       No flowsheet data found.  No flowsheet data found.  No flowsheet data found.  No flowsheet data found.  No results found for this or any previous visit (from the past 48 hour(s)).  No results found for this visit on 02/14/22.    This note has been dictated using voice recognition software. Any grammatical or context distortions are unintentional and inherent to the software

## 2022-02-14 NOTE — ASSESSMENT & PLAN NOTE
Blood pressure at home has consistently been in the 140s.  No lightheadedness or dizziness.  She feels well.  Today, she comes directly from an appointment next door in which she had a painful injection into her eyeball.  We will use her ambulatory home measurements to guide therapy.  I think she is at goal.  We will check a basic metabolic panel (patient declined to have this drawn today given her discomfort in her eye but will return in the near future) to evaluate electrolyte disturbances given the recent changes in her medications.  -Continue lisinopril 20 mg  -Continue hydrochlorothiazide 25 mg

## 2022-02-17 ENCOUNTER — LAB (OUTPATIENT)
Dept: LAB | Facility: CLINIC | Age: 77
End: 2022-02-17
Payer: MEDICARE

## 2022-02-17 DIAGNOSIS — I10 BENIGN ESSENTIAL HYPERTENSION: ICD-10-CM

## 2022-02-17 LAB
ANION GAP SERPL CALCULATED.3IONS-SCNC: 11 MMOL/L (ref 5–18)
BUN SERPL-MCNC: 22 MG/DL (ref 8–28)
CALCIUM SERPL-MCNC: 9.3 MG/DL (ref 8.5–10.5)
CHLORIDE BLD-SCNC: 90 MMOL/L (ref 98–107)
CO2 SERPL-SCNC: 23 MMOL/L (ref 22–31)
CREAT SERPL-MCNC: 0.77 MG/DL (ref 0.6–1.1)
GFR SERPL CREATININE-BSD FRML MDRD: 79 ML/MIN/1.73M2
GLUCOSE BLD-MCNC: 119 MG/DL (ref 70–125)
POTASSIUM BLD-SCNC: 4.1 MMOL/L (ref 3.5–5)
SODIUM SERPL-SCNC: 124 MMOL/L (ref 136–145)

## 2022-02-17 PROCEDURE — 36415 COLL VENOUS BLD VENIPUNCTURE: CPT

## 2022-02-17 PROCEDURE — 80048 BASIC METABOLIC PNL TOTAL CA: CPT

## 2022-02-24 ENCOUNTER — TELEPHONE (OUTPATIENT)
Dept: FAMILY MEDICINE | Facility: CLINIC | Age: 77
End: 2022-02-24
Payer: MEDICARE

## 2022-02-24 DIAGNOSIS — I10 BENIGN ESSENTIAL HYPERTENSION: ICD-10-CM

## 2022-02-24 RX ORDER — AMLODIPINE BESYLATE 5 MG/1
5 TABLET ORAL DAILY
Qty: 30 TABLET | Refills: 1 | Status: SHIPPED | OUTPATIENT
Start: 2022-02-24 | End: 2022-03-08

## 2022-02-24 NOTE — Clinical Note
Please call patient.  Let her know of medication change.  Please schedule her for follow-up (partner next week or with me shortly thereafter).

## 2022-02-24 NOTE — ASSESSMENT & PLAN NOTE
Hyponatremia.  Likely related to hydrochlorothiazide.  Will transition to amlodipine.  Initial dose we 5 mg.  She will likely need 10 mg.  Follow-up with clinic visit next week to check blood pressure.  Initial dosing sent to pharmacy.  Please contact patient and let her know if this result and of the alternative medication.  She should continue lisinopril at 20 mg for now.

## 2022-02-24 NOTE — PROGRESS NOTES
Benign essential hypertension  Hyponatremia.  Likely related to hydrochlorothiazide.  Will transition to amlodipine.  Initial dose we 5 mg.  She will likely need 10 mg.  Follow-up with clinic visit next week to check blood pressure.  Initial dosing sent to pharmacy.  Please contact patient and let her know if this result and of the alternative medication.  She should continue lisinopril at 20 mg for now.

## 2022-02-24 NOTE — TELEPHONE ENCOUNTER
Mor Chaidez MD   Please call patient.  Let her know of medication change.  Please schedule her for follow-up (partner next week or with me shortly thereafter).     Benign essential hypertension  Hyponatremia.  Likely related to hydrochlorothiazide.  Will transition to amlodipine.  Initial dose we 5 mg.  She will likely need 10 mg.  Follow-up with clinic visit next week to check blood pressure.  Initial dosing sent to pharmacy.  Please contact patient and let her know if this result and of the alternative medication.  She should continue lisinopril at 20 mg for now.

## 2022-03-08 ENCOUNTER — OFFICE VISIT (OUTPATIENT)
Dept: FAMILY MEDICINE | Facility: CLINIC | Age: 77
End: 2022-03-08
Payer: MEDICARE

## 2022-03-08 VITALS
WEIGHT: 140.7 LBS | HEIGHT: 64 IN | RESPIRATION RATE: 14 BRPM | OXYGEN SATURATION: 97 % | SYSTOLIC BLOOD PRESSURE: 142 MMHG | DIASTOLIC BLOOD PRESSURE: 64 MMHG | BODY MASS INDEX: 24.02 KG/M2 | TEMPERATURE: 98.1 F | HEART RATE: 69 BPM

## 2022-03-08 DIAGNOSIS — I10 BENIGN ESSENTIAL HYPERTENSION: Primary | ICD-10-CM

## 2022-03-08 DIAGNOSIS — E87.1 HYPONATREMIA: ICD-10-CM

## 2022-03-08 DIAGNOSIS — H61.22 IMPACTED CERUMEN OF LEFT EAR: ICD-10-CM

## 2022-03-08 LAB
ANION GAP SERPL CALCULATED.3IONS-SCNC: 11 MMOL/L (ref 5–18)
BUN SERPL-MCNC: 21 MG/DL (ref 8–28)
CALCIUM SERPL-MCNC: 9.7 MG/DL (ref 8.5–10.5)
CHLORIDE BLD-SCNC: 96 MMOL/L (ref 98–107)
CO2 SERPL-SCNC: 24 MMOL/L (ref 22–31)
CREAT SERPL-MCNC: 0.87 MG/DL (ref 0.6–1.1)
GFR SERPL CREATININE-BSD FRML MDRD: 68 ML/MIN/1.73M2
GLUCOSE BLD-MCNC: 95 MG/DL (ref 70–125)
POTASSIUM BLD-SCNC: 5.1 MMOL/L (ref 3.5–5)
SODIUM SERPL-SCNC: 131 MMOL/L (ref 136–145)

## 2022-03-08 PROCEDURE — 99214 OFFICE O/P EST MOD 30 MIN: CPT | Mod: 25 | Performed by: FAMILY MEDICINE

## 2022-03-08 PROCEDURE — 36415 COLL VENOUS BLD VENIPUNCTURE: CPT | Performed by: FAMILY MEDICINE

## 2022-03-08 PROCEDURE — 69209 REMOVE IMPACTED EAR WAX UNI: CPT | Performed by: FAMILY MEDICINE

## 2022-03-08 PROCEDURE — 80048 BASIC METABOLIC PNL TOTAL CA: CPT | Performed by: FAMILY MEDICINE

## 2022-03-08 RX ORDER — FLUTICASONE PROPIONATE 50 MCG
SPRAY, SUSPENSION (ML) NASAL
COMMUNITY
Start: 2022-02-27 | End: 2022-07-21

## 2022-03-08 RX ORDER — AMLODIPINE BESYLATE 5 MG/1
5 TABLET ORAL DAILY
Qty: 90 TABLET | Refills: 1 | Status: SHIPPED | OUTPATIENT
Start: 2022-03-08 | End: 2022-08-30

## 2022-03-08 NOTE — ASSESSMENT & PLAN NOTE
BP improved.  Check sodium.   - continue amlodipine at 5 mg   - continue lisinopril   - check BMP    - f/u 6 months.

## 2022-03-08 NOTE — PROGRESS NOTES
"Assessment/Plan:    Ceruminosis:  Irrigated by clinic staff.     Benign essential hypertension  BP improved.  Check sodium.   - continue amlodipine at 5 mg   - continue lisinopril   - check BMP    - f/u 6 months.     Return in about 6 months (around 9/8/2022) for Hypertension (Blood Pressure).    Mor Chaidez MD  _______________________________    Chief Complaint   Patient presents with     Follow Up     BP     Ear Problem     left ear plug     Subjective: Sharon L Curran Schwab is a 76 year old year old female who returns to clinic for the following chronic complaints/concerns:     BP:    - doing well.  No concerns with eye.   - No side effects with new medication   - energy okay.     Left ear: Plugged.  History of eustachian tube dysfunction    Review of Systems   Constitutional:        Review of systems negative except as noted in the HPI.   All other systems reviewed and are negative.       Reviewed history:                 Objective:    height is 1.626 m (5' 4\") and weight is 63.8 kg (140 lb 11.2 oz). Her oral temperature is 98.1  F (36.7  C). Her blood pressure is 142/64 (abnormal) and her pulse is 69. Her respiration is 14 and oxygen saturation is 97%.   Physical Exam  Nursing note reviewed.   Constitutional:       General: She is not in acute distress.     Appearance: Normal appearance. She is not ill-appearing.   HENT:      Head: Normocephalic and atraumatic.      Right Ear: External ear normal. There is no impacted cerumen.      Left Ear: External ear normal. There is impacted cerumen.   Eyes:      Extraocular Movements: Extraocular movements intact.      Conjunctiva/sclera: Conjunctivae normal.   Pulmonary:      Effort: Pulmonary effort is normal.   Musculoskeletal:      Right lower leg: No edema.      Left lower leg: No edema.   Neurological:      Mental Status: She is alert and oriented to person, place, and time.   Psychiatric:         Attention and Perception: Attention normal.         Mood and " Affect: Mood normal.         Speech: Speech normal.         Thought Content: Thought content normal.       No flowsheet data found.  No flowsheet data found.  No flowsheet data found.  No flowsheet data found.  No results found for this or any previous visit (from the past 48 hour(s)).  No results found for this visit on 03/08/22.    This note has been dictated using voice recognition software. Any grammatical or context distortions are unintentional and inherent to the software

## 2022-03-08 NOTE — LETTER
March 10, 2022      Sharon L Curran Schwab  [address]  [facility] [address]        Dear Ms.Curran Schwab,    We are writing to inform you of your test results.    Your sodium has improved.  Your potassium is also gone up but is very close to the normal range.  We should plan to recheck in 3 to 6 months.    Resulted Orders   Basic metabolic panel   Result Value Ref Range    Sodium 131 (L) 136 - 145 mmol/L    Potassium 5.1 (H) 3.5 - 5.0 mmol/L    Chloride 96 (L) 98 - 107 mmol/L    Carbon Dioxide (CO2) 24 22 - 31 mmol/L    Anion Gap 11 5 - 18 mmol/L    Urea Nitrogen 21 8 - 28 mg/dL    Creatinine 0.87 0.60 - 1.10 mg/dL    Calcium 9.7 8.5 - 10.5 mg/dL    Glucose 95 70 - 125 mg/dL    GFR Estimate 68 >60 mL/min/1.73m2      Comment:      Effective December 21, 2021 eGFRcr in adults is calculated using the 2021 CKD-EPI creatinine equation which includes age and gender (Natacha et al., NEJM, DOI: 10.1056/QDVVwo6084675)     If you have any questions or concerns, please call the clinic at the number listed above.     Sincerely,    Mor Chaidez MD

## 2022-06-08 ENCOUNTER — OFFICE VISIT (OUTPATIENT)
Dept: FAMILY MEDICINE | Facility: CLINIC | Age: 77
End: 2022-06-08
Payer: MEDICARE

## 2022-06-08 VITALS
WEIGHT: 140.6 LBS | BODY MASS INDEX: 24.01 KG/M2 | SYSTOLIC BLOOD PRESSURE: 138 MMHG | HEART RATE: 67 BPM | DIASTOLIC BLOOD PRESSURE: 80 MMHG | OXYGEN SATURATION: 97 % | HEIGHT: 64 IN

## 2022-06-08 DIAGNOSIS — E78.00 PURE HYPERCHOLESTEROLEMIA: ICD-10-CM

## 2022-06-08 DIAGNOSIS — I10 BENIGN ESSENTIAL HYPERTENSION: ICD-10-CM

## 2022-06-08 DIAGNOSIS — B37.89 PERIANAL CANDIDIASIS: Primary | ICD-10-CM

## 2022-06-08 DIAGNOSIS — Z13.220 SCREENING FOR HYPERLIPIDEMIA: ICD-10-CM

## 2022-06-08 DIAGNOSIS — C50.812 MALIGNANT NEOPLASM OF OVERLAPPING SITES OF LEFT BREAST IN FEMALE, ESTROGEN RECEPTOR POSITIVE (H): ICD-10-CM

## 2022-06-08 DIAGNOSIS — Z17.0 MALIGNANT NEOPLASM OF OVERLAPPING SITES OF LEFT BREAST IN FEMALE, ESTROGEN RECEPTOR POSITIVE (H): ICD-10-CM

## 2022-06-08 PROCEDURE — 99213 OFFICE O/P EST LOW 20 MIN: CPT | Performed by: FAMILY MEDICINE

## 2022-06-08 RX ORDER — KETOCONAZOLE 20 MG/G
CREAM TOPICAL DAILY
Qty: 60 G | Refills: 1 | Status: SHIPPED | OUTPATIENT
Start: 2022-06-08

## 2022-06-08 RX ORDER — FLUCONAZOLE 150 MG/1
150 TABLET ORAL
Qty: 2 TABLET | Refills: 0 | Status: SHIPPED | OUTPATIENT
Start: 2022-06-08 | End: 2022-06-12

## 2022-06-08 ASSESSMENT — ENCOUNTER SYMPTOMS: CONSTITUTIONAL NEGATIVE: 1

## 2022-06-08 NOTE — PATIENT INSTRUCTIONS
Uses copious amounts of barrier protection containing zinc throughout the day, especially when more symptomatics.  An example includes CALMOSEPTINE.

## 2022-06-08 NOTE — ASSESSMENT & PLAN NOTE
Exam most consistent with perianal candidiasis.  I believe she is actually making this condition worse by applying antibiotics.  Given severity and symptoms we will treat with oral antifungals.  Diflucan x2-doses prescribed.  Topical antifungal also prescribed.  Barrier protectant with zinc containing products also advised.  If not improving in 5 to 7 days, she should return to clinic for evaluation.

## 2022-06-08 NOTE — PROGRESS NOTES
Problem List Items Addressed This Visit     Benign essential hypertension     BP controlled today.  No side effects.            Malignant neoplasm of overlapping sites of left breast in female, estrogen receptor positive (H)     Continues to follow with oncology.            Perianal candidiasis - Primary     Exam most consistent with perianal candidiasis.  I believe she is actually making this condition worse by applying antibiotics.  Given severity and symptoms we will treat with oral antifungals.  Diflucan x2-doses prescribed.  Topical antifungal also prescribed.  Barrier protectant with zinc containing products also advised.  If not improving in 5 to 7 days, she should return to clinic for evaluation.           Relevant Medications    fluconazole (DIFLUCAN) 150 MG tablet    ketoconazole (NIZORAL) 2 % external cream    Pure hypercholesterolemia       Other Visit Diagnoses     Screening for hyperlipidemia                 Subjective   Eva is a 76 year old who presents for the following health issues   Chief Complaint   Patient presents with     Bottom itchiness x a month      Perianal itch:   - urinary leakage    - pain and itching that fluctuates   - worse for the past month   - she applies neosporin after a BM.   - she otherwise feels well.     - she continues to have hot flashes.     -     History of Present Illness       Reason for visit:  Female problems  Symptom onset:  3-4 weeks ago  Symptom intensity:  Moderate  Symptom progression:  Staying the same  Had these symptoms before:  No  What makes it worse:  Nothing itch  What makes it better:  Medicine    She eats 2-3 servings of fruits and vegetables daily.She consumes 0 sweetened beverage(s) daily.She exercises with enough effort to increase her heart rate 30 to 60 minutes per day.    She is taking medications regularly.       Review of Systems   Constitutional: Negative.    All other systems reviewed and are negative.           Objective    BP  "138/80 (BP Location: Left arm, Patient Position: Sitting, Cuff Size: Adult Regular)   Pulse 67   Ht 1.626 m (5' 4\")   Wt 63.8 kg (140 lb 9.6 oz)   LMP  (LMP Unknown)   SpO2 97%   BMI 24.13 kg/m    Body mass index is 24.13 kg/m .  Physical Exam  Nursing note reviewed.   Constitutional:       General: She is not in acute distress.     Appearance: Normal appearance. She is not ill-appearing.   HENT:      Head: Normocephalic and atraumatic.   Eyes:      Extraocular Movements: Extraocular movements intact.      Conjunctiva/sclera: Conjunctivae normal.   Pulmonary:      Effort: Pulmonary effort is normal.   Skin:     Comments: The gluteal fold as well demarcated areas of erythema.  There is excoriation and fissuring.  No bleeding.  There is some white appearing discharge.  Skin tags in the perianal area.  No hemorrhoids.   Neurological:      Mental Status: She is alert and oriented to person, place, and time.   Psychiatric:         Attention and Perception: Attention normal.         Mood and Affect: Mood normal.         Speech: Speech normal.         Thought Content: Thought content normal.                  This note has been dictated using voice recognition software. Any grammatical or context distortions are unintentional and inherent to the software      "

## 2022-07-21 DIAGNOSIS — H65.92 FLUID LEVEL BEHIND TYMPANIC MEMBRANE OF LEFT EAR: Primary | ICD-10-CM

## 2022-07-21 RX ORDER — FLUTICASONE PROPIONATE 50 MCG
SPRAY, SUSPENSION (ML) NASAL
Qty: 16 G | Refills: 11 | Status: SHIPPED | OUTPATIENT
Start: 2022-07-21 | End: 2023-08-02

## 2022-07-21 NOTE — TELEPHONE ENCOUNTER
"Routing refill request to provider for review/approval because:  No diagnosis attached to medication    Last Written Prescription Date: 6/28/21  Last Fill Quantity: 16g,  # refills: 12   Last office visit provider:  6/8/22      Requested Prescriptions   Pending Prescriptions Disp Refills     fluticasone (FLONASE) 50 MCG/ACT nasal spray [Pharmacy Med Name: Fluticasone Propionate Nasal Suspension 50 MCG/ACT] 16 g 11     Sig: place 1 spray into each nostril once daily       Nasal Allergy Protocol Passed - 7/21/2022  7:27 AM        Passed - Patient is age 12 or older        Passed - Recent (12 mo) or future (30 days) visit within the authorizing provider's specialty     Patient has had an office visit with the authorizing provider or a provider within the authorizing providers department within the previous 12 mos or has a future within next 30 days. See \"Patient Info\" tab in inbasket, or \"Choose Columns\" in Meds & Orders section of the refill encounter.              Passed - Medication is active on med list             Diana Edmonds RN 07/21/22 3:04 PM  "

## 2022-08-30 DIAGNOSIS — I10 BENIGN ESSENTIAL HYPERTENSION: ICD-10-CM

## 2022-08-30 RX ORDER — AMLODIPINE BESYLATE 5 MG/1
5 TABLET ORAL DAILY
Qty: 90 TABLET | Refills: 2 | Status: SHIPPED | OUTPATIENT
Start: 2022-08-30 | End: 2023-04-07

## 2022-08-30 NOTE — TELEPHONE ENCOUNTER
"Last Written Prescription Date:  3/8/22  Last Fill Quantity: 90,  # refills: 1   Last office visit provider:  6/8/22     Requested Prescriptions   Pending Prescriptions Disp Refills     amLODIPine (NORVASC) 5 MG tablet [Pharmacy Med Name: amLODIPine Besylate Oral Tablet 5 MG] 90 tablet 0     Sig: TAKE ONE TABLET BY MOUTH ONE TIME DAILY       Calcium Channel Blockers Protocol  Passed - 8/30/2022 11:51 AM        Passed - Blood pressure under 140/90 in past 12 months     BP Readings from Last 3 Encounters:   06/08/22 138/80   03/08/22 (!) 142/64   02/14/22 (!) 182/96                 Passed - Recent (12 mo) or future (30 days) visit within the authorizing provider's specialty     Patient has had an office visit with the authorizing provider or a provider within the authorizing providers department within the previous 12 mos or has a future within next 30 days. See \"Patient Info\" tab in inbasket, or \"Choose Columns\" in Meds & Orders section of the refill encounter.              Passed - Medication is active on med list        Passed - Patient is age 18 or older        Passed - No active pregnancy on record        Passed - Normal serum creatinine on file in past 12 months     Recent Labs   Lab Test 03/08/22  1353   CR 0.87       Ok to refill medication if creatinine is low          Passed - No positive pregnancy test in past 12 months             Brock Valverde RN 08/30/22 11:51 AM  "

## 2022-09-02 ENCOUNTER — OFFICE VISIT (OUTPATIENT)
Dept: FAMILY MEDICINE | Facility: CLINIC | Age: 77
End: 2022-09-02
Payer: MEDICARE

## 2022-09-02 VITALS
TEMPERATURE: 97.5 F | DIASTOLIC BLOOD PRESSURE: 70 MMHG | HEART RATE: 68 BPM | SYSTOLIC BLOOD PRESSURE: 132 MMHG | WEIGHT: 139.5 LBS | BODY MASS INDEX: 23.82 KG/M2 | HEIGHT: 64 IN | RESPIRATION RATE: 16 BRPM

## 2022-09-02 DIAGNOSIS — E78.00 PURE HYPERCHOLESTEROLEMIA: ICD-10-CM

## 2022-09-02 DIAGNOSIS — Z00.00 ENCOUNTER FOR MEDICARE ANNUAL WELLNESS EXAM: Primary | ICD-10-CM

## 2022-09-02 DIAGNOSIS — I10 BENIGN ESSENTIAL HYPERTENSION: ICD-10-CM

## 2022-09-02 DIAGNOSIS — B37.89 PERIANAL CANDIDIASIS: ICD-10-CM

## 2022-09-02 DIAGNOSIS — Z13.220 SCREENING FOR HYPERLIPIDEMIA: ICD-10-CM

## 2022-09-02 DIAGNOSIS — Z87.898 HISTORY OF URINARY INCONTINENCE: ICD-10-CM

## 2022-09-02 DIAGNOSIS — Z85.3 PERSONAL HISTORY OF MALIGNANT NEOPLASM OF BREAST: ICD-10-CM

## 2022-09-02 PROBLEM — L98.9 SKIN LESION: Status: RESOLVED | Noted: 2020-12-03 | Resolved: 2022-09-02

## 2022-09-02 PROBLEM — R30.0 DYSURIA: Status: RESOLVED | Noted: 2017-11-20 | Resolved: 2022-09-02

## 2022-09-02 PROBLEM — H26.9 CATARACT OF BOTH EYES, UNSPECIFIED CATARACT TYPE: Status: RESOLVED | Noted: 2021-05-13 | Resolved: 2022-09-02

## 2022-09-02 LAB
ALT SERPL W P-5'-P-CCNC: 30 U/L (ref 10–35)
ANION GAP SERPL CALCULATED.3IONS-SCNC: 9 MMOL/L (ref 7–15)
BUN SERPL-MCNC: 12.3 MG/DL (ref 8–23)
CALCIUM SERPL-MCNC: 9.8 MG/DL (ref 8.8–10.2)
CHLORIDE SERPL-SCNC: 93 MMOL/L (ref 98–107)
CHOLEST SERPL-MCNC: 199 MG/DL
CREAT SERPL-MCNC: 0.58 MG/DL (ref 0.51–0.95)
DEPRECATED HCO3 PLAS-SCNC: 27 MMOL/L (ref 22–29)
GFR SERPL CREATININE-BSD FRML MDRD: >90 ML/MIN/1.73M2
GLUCOSE SERPL-MCNC: 108 MG/DL (ref 70–99)
HDLC SERPL-MCNC: 76 MG/DL
LDLC SERPL CALC-MCNC: 104 MG/DL
NONHDLC SERPL-MCNC: 123 MG/DL
POTASSIUM SERPL-SCNC: 4.9 MMOL/L (ref 3.4–5.3)
SODIUM SERPL-SCNC: 129 MMOL/L (ref 136–145)
TRIGL SERPL-MCNC: 97 MG/DL

## 2022-09-02 PROCEDURE — 36415 COLL VENOUS BLD VENIPUNCTURE: CPT | Performed by: FAMILY MEDICINE

## 2022-09-02 PROCEDURE — 84460 ALANINE AMINO (ALT) (SGPT): CPT | Performed by: FAMILY MEDICINE

## 2022-09-02 PROCEDURE — 90732 PPSV23 VACC 2 YRS+ SUBQ/IM: CPT | Performed by: FAMILY MEDICINE

## 2022-09-02 PROCEDURE — 80061 LIPID PANEL: CPT | Performed by: FAMILY MEDICINE

## 2022-09-02 PROCEDURE — G0439 PPPS, SUBSEQ VISIT: HCPCS | Performed by: FAMILY MEDICINE

## 2022-09-02 PROCEDURE — G0009 ADMIN PNEUMOCOCCAL VACCINE: HCPCS | Performed by: FAMILY MEDICINE

## 2022-09-02 PROCEDURE — 80048 BASIC METABOLIC PNL TOTAL CA: CPT | Performed by: FAMILY MEDICINE

## 2022-09-02 ASSESSMENT — ENCOUNTER SYMPTOMS
HEMATURIA: 0
DYSURIA: 0
ARTHRALGIAS: 0
NERVOUS/ANXIOUS: 0
MYALGIAS: 0
WEAKNESS: 0
NAUSEA: 0
HEADACHES: 0
CONSTIPATION: 0
COUGH: 0
DIZZINESS: 0
DIARRHEA: 0
HEARTBURN: 0
PARESTHESIAS: 0
SORE THROAT: 0
CHILLS: 0
BREAST MASS: 0
SHORTNESS OF BREATH: 0
FREQUENCY: 0
EYE PAIN: 0
ABDOMINAL PAIN: 0
PALPITATIONS: 0
HEMATOCHEZIA: 0
FEVER: 0
JOINT SWELLING: 0

## 2022-09-02 ASSESSMENT — PATIENT HEALTH QUESTIONNAIRE - PHQ9
SUM OF ALL RESPONSES TO PHQ QUESTIONS 1-9: 5
10. IF YOU CHECKED OFF ANY PROBLEMS, HOW DIFFICULT HAVE THESE PROBLEMS MADE IT FOR YOU TO DO YOUR WORK, TAKE CARE OF THINGS AT HOME, OR GET ALONG WITH OTHER PEOPLE: NOT DIFFICULT AT ALL
SUM OF ALL RESPONSES TO PHQ QUESTIONS 1-9: 5

## 2022-09-02 ASSESSMENT — ACTIVITIES OF DAILY LIVING (ADL): CURRENT_FUNCTION: NO ASSISTANCE NEEDED

## 2022-09-02 NOTE — PROGRESS NOTES
"SUBJECTIVE:   Sharon L Curran Schwab is a 77 year old female who presents for Preventive Visit.    Chief Complaint   Patient presents with     Wellness Visit     Pt. Fasting.     Patient has been advised of split billing requirements and indicates understanding: Yes  Are you in the first 12 months of your Medicare coverage?  No    PT:   - able to walk at fair.  Doing well.      Healthy Habits:     In general, how would you rate your overall health?  Good    Frequency of exercise:  6-7 days/week    Duration of exercise:  30-45 minutes    Do you usually eat at least 4 servings of fruit and vegetables a day, include whole grains    & fiber and avoid regularly eating high fat or \"junk\" foods?  Yes    Taking medications regularly:  Yes    Medication side effects:  None    Ability to successfully perform activities of daily living:  No assistance needed    Home Safety:  No safety concerns identified    Hearing Impairment:  No hearing concerns    In the past 6 months, have you been bothered by leaking of urine? Yes    In general, how would you rate your overall mental or emotional health?  Good      PHQ-2 Total Score: 0    Additional concerns today:  No    Do you feel safe in your environment? Yes    Have you ever done Advance Care Planning? (For example, a Health Directive, POLST, or a discussion with a medical provider or your loved ones about your wishes): Yes, patient states has an Advance Care Planning document and will bring a copy to the clinic.       Fall risk  Fallen 2 or more times in the past year?: Yes  Any fall with injury in the past year?: No    Cognitive Screening   1) Repeat 3 items (Leader, Season, Table)  PASS  2) Clock draw: ABNORMAL See clock  3) 3 item recall: Recalls 3 objects  Results: ABNORMAL clock, 1-2 items recalled: PROBABLE COGNITIVE IMPAIRMENT, **INFORM PROVIDER**    Mini-CogTM Copyright BROOKLYNN Julio. Licensed by the author for use in [facility]; reprinted with permission " ([email or web address]). All rights reserved.      Do you have sleep apnea, excessive snoring or daytime drowsiness?: no    Reviewed and updated as needed this visit by clinical staff   Tobacco  Allergies  Meds                Reviewed and updated as needed this visit by Provider                   Social History     Tobacco Use     Smoking status: Never Smoker     Smokeless tobacco: Never Used   Substance Use Topics     Alcohol use: Yes     Comment: 1 drink every PM     If you drink alcohol do you typically have >3 drinks per day or >7 drinks per week? No    Alcohol Use 9/2/2022   Prescreen: >3 drinks/day or >7 drinks/week? No   Prescreen: >3 drinks/day or >7 drinks/week? -               Current providers sharing in care for this patient include:   Patient Care Team:  Mor Chaidez MD as PCP - General (Family Practice)  August Arvizu MD as MD (Hematology & Oncology)  Mor Chaidez MD as Assigned PCP  August Arvizu MD as Assigned Cancer Care Provider  Leslie Heller, RN as Specialty Care Coordinator (Hematology & Oncology)    The following health maintenance items are reviewed in Epic and correct as of today:  Health Maintenance Due   Topic Date Due     Pneumococcal Vaccine: 65+ Years (2 - PPSV23 or PCV20) 01/16/2020     LIPID  12/12/2021     COVID-19 Vaccine (4 - Booster for Pfizer series) 04/08/2022     INFLUENZA VACCINE (1) 09/01/2022       Pneumonia Vaccine:For adults 65 years or older who do not have an immunocompromising condition, cerebrospinal fluid leak, or cochlear implant and want to receive PPSV23 ONLY: Administer 1 dose of PPSV23. Anyone who received any doses of PPSV23 before age 65 should receive 1 final dose of the vaccine at age 65 or older. Administer this last dose at least 5 years after the prior PPSV23 dose.    Mammogram Screening - Patient over age 75, has elected to discontinue screenings.  Pertinent mammograms are reviewed under the imaging tab.    Review of Systems   Constitutional:  "Negative for chills and fever.   HENT: Negative for congestion, ear pain, hearing loss and sore throat.    Eyes: Negative for pain and visual disturbance.   Respiratory: Negative for cough and shortness of breath.    Cardiovascular: Negative for chest pain, palpitations and peripheral edema.   Gastrointestinal: Negative for abdominal pain, constipation, diarrhea, heartburn, hematochezia and nausea.   Breasts:  Negative for tenderness, breast mass and discharge.   Genitourinary: Negative for dysuria, frequency, genital sores, hematuria, pelvic pain, urgency, vaginal bleeding and vaginal discharge.   Musculoskeletal: Negative for arthralgias, joint swelling and myalgias.   Skin: Negative for rash.   Neurological: Negative for dizziness, weakness, headaches and paresthesias.   Psychiatric/Behavioral: Negative for mood changes. The patient is not nervous/anxious.      OBJECTIVE:   /70 (BP Location: Left arm, Patient Position: Sitting, Cuff Size: Adult Regular)   Pulse 68   Temp 97.5  F (36.4  C) (Oral)   Resp 16   Ht 1.626 m (5' 4\")   Wt 63.3 kg (139 lb 8 oz)   LMP  (LMP Unknown)   BMI 23.95 kg/m   Estimated body mass index is 23.95 kg/m  as calculated from the following:    Height as of this encounter: 1.626 m (5' 4\").    Weight as of this encounter: 63.3 kg (139 lb 8 oz).  Physical Exam  Vitals reviewed.   Constitutional:       General: She is not in acute distress.     Appearance: Normal appearance. She is not ill-appearing.   HENT:      Head: Normocephalic and atraumatic.      Right Ear: External ear normal.      Left Ear: External ear normal.      Nose: Nose normal.      Mouth/Throat:      Pharynx: Oropharynx is clear. No oropharyngeal exudate or posterior oropharyngeal erythema.   Eyes:      General: No scleral icterus.        Right eye: No discharge.         Left eye: No discharge.      Extraocular Movements: Extraocular movements intact.      Conjunctiva/sclera: Conjunctivae normal.      Pupils: " Pupils are equal, round, and reactive to light.   Neck:      Comments: No thyromegaly.  Cardiovascular:      Rate and Rhythm: Normal rate and regular rhythm.      Heart sounds: Normal heart sounds. No murmur heard.    No friction rub. No gallop.   Pulmonary:      Effort: Pulmonary effort is normal. No respiratory distress.      Breath sounds: Normal breath sounds. No wheezing or rales.   Abdominal:      General: There is no distension.      Palpations: Abdomen is soft. There is no mass.      Tenderness: There is no abdominal tenderness.   Musculoskeletal:         General: No signs of injury. Normal range of motion.      Cervical back: Normal range of motion.      Right lower leg: No edema.      Left lower leg: No edema.   Lymphadenopathy:      Cervical: No cervical adenopathy.   Skin:     General: Skin is warm.      Coloration: Skin is not jaundiced.      Findings: No rash.   Neurological:      General: No focal deficit present.      Mental Status: She is alert and oriented to person, place, and time.      Cranial Nerves: No cranial nerve deficit.      Deep Tendon Reflexes: Reflexes normal.   Psychiatric:         Mood and Affect: Mood normal.           Diagnostic Test Results:  Labs reviewed in Epic    ASSESSMENT / PLAN:     Problem List Items Addressed This Visit     Benign essential hypertension     BP controlled.  Check BMP.  No concerns today.  Swelling remains mild but unchanged since adding amlodipine.  She wears compression, in particular when she is up on her feet for extended periods of time.            Relevant Orders    Basic metabolic panel  (Ca, Cl, CO2, Creat, Gluc, K, Na, BUN)    ALT    Encounter for Medicare annual wellness exam - Primary     Doing well.  Ongoing physical therapy.  Ongoing management of knee pain through orthopedics.  Active.  Mood stable.           History of urinary incontinence     Wears a pad.           RESOLVED: Perianal candidiasis     Resolved.            Personal history of  "malignant neoplasm of breast     Not a concern today.             Pure hypercholesterolemia      Discussed that there are not guidelines to guide cholesterol therapy for her age group.  We will check her cholesterol when last time.  Unlikely to be abnormal.             Other Visit Diagnoses     Screening for hyperlipidemia        Relevant Orders    Lipid panel reflex to direct LDL Non-fasting    Lipid Profile (Chol, Trig, HDL, LDL calc)          Patient has been advised of split billing requirements and indicates understanding: Yes    COUNSELING:  Reviewed preventive health counseling, as reflected in patient instructions       Regular exercise       Healthy diet/nutrition    Estimated body mass index is 23.95 kg/m  as calculated from the following:    Height as of this encounter: 1.626 m (5' 4\").    Weight as of this encounter: 63.3 kg (139 lb 8 oz).    She reports that she has never smoked. She has never used smokeless tobacco.      Appropriate preventive services were discussed with this patient, including applicable screening as appropriate for cardiovascular disease, diabetes, osteopenia/osteoporosis, and glaucoma.  As appropriate for age/gender, discussed screening for colorectal cancer, prostate cancer, breast cancer, and cervical cancer. Checklist reviewing preventive services available has been given to the patient.    Reviewed patients plan of care and provided an AVS. The Basic Care Plan (routine screening as documented in Health Maintenance) for Eva meets the Care Plan requirement. This Care Plan has been established and reviewed with the Patient.    Counseling Resources:  ATP IV Guidelines  Pooled Cohorts Equation Calculator  Breast Cancer Risk Calculator  Breast Cancer: Medication to Reduce Risk  FRAX Risk Assessment  ICSI Preventive Guidelines  Dietary Guidelines for Americans, 2010  USDA's MyPlate  ASA Prophylaxis  Lung CA Screening    Mor Chaidez MD  [facility] " GEOVANI    Identified Health Risks:    The patient's PHQ-9 score is consistent with mild depression. She was provided with information regarding depression and was advised to schedule a follow up appointment in 26 weeks to further address this issue.  She is at risk for falling and has been provided with information to reduce the risk of falling at home.

## 2022-09-02 NOTE — ASSESSMENT & PLAN NOTE
Discussed that there are not guidelines to guide cholesterol therapy for her age group.  We will check her cholesterol when last time.  Unlikely to be abnormal.

## 2022-09-02 NOTE — ASSESSMENT & PLAN NOTE
Doing well.  Ongoing physical therapy.  Ongoing management of knee pain through orthopedics.  Active.  Mood stable.

## 2022-09-02 NOTE — LETTER
September 7, 2022      Eva Shepard Schwab  [address]  [place] [address]        Dear Ms.Curran Schwab,    We are writing to inform you of your test results.  Your testing is stable.  We will continue to need to track your sodium.  It went down slightly in comparison to the last measurement but is quite a bit improved in comparison to measurements from early this calendar year.    Resulted Orders   Lipid panel reflex to direct LDL Non-fasting   Result Value Ref Range    Cholesterol 199 <200 mg/dL    Triglycerides 97 <150 mg/dL    Direct Measure HDL 76 >=50 mg/dL    LDL Cholesterol Calculated 104 (H) <=100 mg/dL    Non HDL Cholesterol 123 <130 mg/dL    Narrative    Cholesterol  Desirable:  <200 mg/dL    Triglycerides  Normal:  Less than 150 mg/dL  Borderline High:  150-199 mg/dL  High:  200-499 mg/dL  Very High:  Greater than or equal to 500 mg/dL    Direct Measure HDL  Female:  Greater than or equal to 50 mg/dL   Male:  Greater than or equal to 40 mg/dL    LDL Cholesterol  Desirable:  <100mg/dL  Above Desirable:  100-129 mg/dL   Borderline High:  130-159 mg/dL   High:  160-189 mg/dL   Very High:  >= 190 mg/dL    Non HDL Cholesterol  Desirable:  130 mg/dL  Above Desirable:  130-159 mg/dL  Borderline High:  160-189 mg/dL  High:  190-219 mg/dL  Very High:  Greater than or equal to 220 mg/dL   Basic metabolic panel  (Ca, Cl, CO2, Creat, Gluc, K, Na, BUN)   Result Value Ref Range    Creatinine 0.58 0.51 - 0.95 mg/dL    Sodium 129 (L) 136 - 145 mmol/L    Potassium 4.9 3.4 - 5.3 mmol/L    Urea Nitrogen 12.3 8.0 - 23.0 mg/dL    Chloride 93 (L) 98 - 107 mmol/L    Carbon Dioxide (CO2) 27 22 - 29 mmol/L    Anion Gap 9 7 - 15 mmol/L    Glucose 108 (H) 70 - 99 mg/dL    GFR Estimate >90 >60 mL/min/1.73m2      Comment:      Effective December 21, 2021 eGFRcr in adults is calculated using the 2021 CKD-EPI creatinine equation which includes age and gender (Natacha sawyer al., NEJM, DOI: 10.1056/WTLHwn4260027)    Calcium 9.8  8.8 - 10.2 mg/dL   ALT   Result Value Ref Range    ALT 30 10 - 35 U/L       If you have any questions or concerns, please call the clinic at the number listed above.       Sincerely,      Mor Chaidez MD

## 2022-09-02 NOTE — PATIENT INSTRUCTIONS
"  Patient Education   Personalized Prevention Plan  You are due for the preventive services outlined below.  Your care team is available to assist you in scheduling these services.  If you have already completed any of these items, please share that information with your care team to update in your medical record.  Health Maintenance Due   Topic Date Due     Hepatitis C Screening  Never done     Pneumococcal Vaccine (2 - PPSV23 or PCV20) 01/16/2020     Cholesterol Lab  12/12/2021     COVID-19 Vaccine (4 - Booster for Pfizer series) 04/08/2022     Flu Vaccine (1) 09/01/2022       Depression and Suicide in Older Adults    Nearly 2 million older Americans have some type of depression. Some of them even take their own lives. Yet depression among older adults is often ignored. Learn the warning signs. You may help spare a loved one needless pain. You may also save a life.   What is depression?  Depression is a common and serious illness that affects the way you think and feel. It is not a normal part of aging, nor is it a sign of weakness, a character flaw, or something you can snap out of. Most people with depression need treatment to get better. The most common symptom is a feeling of deep sadness. People who are depressed also may seem tired and listless. And nothing seems to give them pleasure. It s normal to grieve or be sad sometimes. But sadness lessens or passes with time. Depression rarely goes away or improves on its own. A person with clinical depression can't \"snap out of it.\" Other symptoms of depression are:     Sleeping more or less than normal    Eating more or less than normal    Having headaches, stomachaches, or other pains that don t go away    Feeling nervous,  empty,  or worthless    Crying a great deal    Thinking or talking about suicide or death    Loss of interest in activities previously enjoyed    Social isolation    Feeling confused or forgetful  What causes it?  The causes of depression " aren t fully known. But it is thought to result from a complex blend of these factors:     Biochemistry. Certain chemicals in the brain play a role.    Genes. Depression does run in families.    Life stress. Life stresses can also trigger depression in some people. Older adults often face many stressors, such as death of friends or a spouse, health problems, and financial concerns.    Chronic conditions. This includes conditions such as diabetes, heart disease, or cancer. These can cause symptoms of depression. Medicine side effects can cause changes in thoughts and behaviors.  How you can help  Often, depressed people may not want to ask for help. When they do, they may be ignored. Or, they may receive the wrong treatment. You can help by showing parents and older friends love and support. If they seem depressed, don t lecture the person, ignore the symptoms, or discount the symptoms as a  normal  part of aging -which they are not. Get involved, listen, and show interest and support.   Help them understand that depression is a treatable illness. Tell them you can help them find the right treatment. Offer to go to their healthcare provider's appointment with them for support when the symptoms are discussed. With their approval, contact a local mental health center, social service agency, or hospital about services.   You can be an advocate for him or her at healthcare appointments. Many older adults have chronic illnesses that can cause symptoms of depression. Medicine side effects can change thoughts and behaviors. You can help make sure that the healthcare provider looks at all of these factors. He or she should refer your family member or friend to a mental healthcare provider when needed. in some cases, untreated depression can lead to a misdiagnosis. A person may be diagnosed with a brain disorder such as dementia. If the healthcare provider does not take the issue of depression seriously, help your family  member or friend to find another provider.   Don't be afraid to ask  If you think an older person you care about could be suicidal, ask,  Have you thought about suicide?  Most people will tell you the truth. If they say  yes,  they may already have a plan for how and when they will attempt it. Find out as much as you can. The more detailed the plan, and the easier it is to carry out, the more danger the person is in right now. Tell the person you are there for them and do not want them to harm him or herself. Don't wait to get help for the person. Call the person's healthcare provider, local hospital, or emergency services.   To learn more    National Suicide Prevention Lifeline (crisis hotline) 788-390-MDIV (025-496-5273)    National [place] of Mental Dhumzg101-148-3607uqc.[facility].nih.gov    National [place] on Mental Lbuhnow553-549-6730hnd.aubrey.org    Mental Health Pbhrlzn472-875-1877jxu.[facility].org    National Suicide Pvoaykr711-JSREJHY (784-357-9112)    Call 911  Never leave the person alone. A person who is actively suicidal needs psychiatric care right away. They will need constant supervision. Never leave the person out of sight. Call 911 or the national 24-hour suicide crisis hotline at 560-735-SRIS (754-174-6170). You can also take the person to the closest emergency room.   obiwon last reviewed this educational content on 5/1/2020 2000-2021 [employer]. All rights reserved. This information is not intended as a substitute for professional medical care. Always follow your healthcare professional's instructions.          Preventing Falls at Home  A person can fall for many reasons. Older adults may fall because reaction time slows down as we age. Your muscles and joints may get stiff, weak, or less flexible because of illness, medicines, or a physical condition.   Other health problems that make falls more likely include:     Arthritis    Dizziness or lightheadedness when you stand up  (orthostatic hypotension)    History of a stroke    Dizziness    Anemia    Certain medicines taken for mental illness or to control blood pressure.    Problems with balance or gait    Bladder or urinary problems    History of falling    Changes in vision (vision impairment)    Changes in thinking skills and memory (cognitive impairment)  Injuries from a fall can include serious injuries such as broken bones, dislocated joints, internal bleeding and cuts. Injuries like these can limit your independence.   Prevention tips  To help prevent falls and fall-related injuries, follow the tips below.    Floors  To make floors safer:     Put nonskid pads under area rugs.    Remove small rugs.    Replace worn floor coverings.    Tack carpets firmly to each step on carpeted stairs. Put nonskid strips on the edges of uncarpeted stairs.    Keep floors and stairs free of clutter and cords.    Arrange furniture so there are clear pathways.    Clean up any spills right away.  Bathrooms    To make bathrooms safer:     Install grab bars in the tub or shower.    Apply nonskid strips or put a nonskid rubber mat in the tub or shower.    Sit on a bath chair to bathe.    Use bathmats with nonskid backing.  Lighting  To improve visibility in your home:      Keep a flashlight in each room. Or put a lamp next to the bed within easy reach.    Put nightlights in the bedrooms, hallways, kitchen, and bathrooms.    Make sure all stairways have good lighting.    Take your time when going up and down stairs.    Put handrails on both sides of stairs and in walkways for more support. To prevent injury to your wrist or arm, don t use handrails to pull yourself up.    Install grab bars to pull yourself up.    Move or rearrange items that you use often. This will make them easier to find or reach.    Look at your home to find any safety hazards. Especially look at doorways, walkways, and the driveway. Remove or repair any safety problems that you  find.  Other changes to make    Look around to find any safety hazards. Look closely at doorways, walkways, and the driveway. Remove or repair any safety problems that you find.    Wear shoes that fit well.    Take your time when going up and down stairs.    Put handrails on both sides of stairs and in walkways for more support. To prevent injury to your wrist or arm, don t use handrails to pull yourself up.    Install grab bars wherever needed to pull yourself up.    Arrange items that you use often. This will make them easier to find or reach.    Trulia last reviewed this educational content on 3/1/2020    0994-3261 [employer]. All rights reserved. This information is not intended as a substitute for professional medical care. Always follow your healthcare professional's instructions.

## 2022-09-02 NOTE — ASSESSMENT & PLAN NOTE
BP controlled.  Check BMP.  No concerns today.  Swelling remains mild but unchanged since adding amlodipine.  She wears compression, in particular when she is up on her feet for extended periods of time.

## 2022-09-14 ENCOUNTER — IMMUNIZATION (OUTPATIENT)
Dept: FAMILY MEDICINE | Facility: CLINIC | Age: 77
End: 2022-09-14
Payer: MEDICARE

## 2022-09-14 PROCEDURE — G0008 ADMIN INFLUENZA VIRUS VAC: HCPCS

## 2022-09-14 PROCEDURE — 90662 IIV NO PRSV INCREASED AG IM: CPT

## 2022-10-02 DIAGNOSIS — I10 BENIGN ESSENTIAL HYPERTENSION: ICD-10-CM

## 2022-10-02 RX ORDER — LISINOPRIL 20 MG/1
TABLET ORAL
Qty: 90 TABLET | Refills: 3 | Status: SHIPPED | OUTPATIENT
Start: 2022-10-02 | End: 2023-04-07

## 2022-10-03 NOTE — TELEPHONE ENCOUNTER
"  Last Written Prescription Date:  11/17/21  Last Fill Quantity: 90,  # refills: 3   Last office visit provider:  9/2/22     Requested Prescriptions   Pending Prescriptions Disp Refills     lisinopril (ZESTRIL) 20 MG tablet [Pharmacy Med Name: Lisinopril Oral Tablet 20 MG] 90 tablet 0     Sig: Take 1 tablet (20 mg total) by mouth daily.       ACE Inhibitors (Including Combos) Protocol Failed - 10/2/2022  7:33 AM        Failed - Normal serum potassium on file in past 12 months     Recent Labs   Lab Test 09/02/22  0804   POTASSIUM 4.9             Passed - Blood pressure under 140/90 in past 12 months     BP Readings from Last 3 Encounters:   09/02/22 132/70   06/08/22 138/80   03/08/22 (!) 142/64                 Passed - Recent (12 mo) or future (30 days) visit within the authorizing provider's specialty     Patient has had an office visit with the authorizing provider or a provider within the authorizing providers department within the previous 12 mos or has a future within next 30 days. See \"Patient Info\" tab in inbasket, or \"Choose Columns\" in Meds & Orders section of the refill encounter.              Passed - Medication is active on med list        Passed - Patient is age 18 or older        Passed - No active pregnancy on record        Passed - Normal serum creatinine on file in past 12 months     Recent Labs   Lab Test 09/02/22  0804   CR 0.58       Ok to refill medication if creatinine is low          Passed - No positive pregnancy test within past 12 months             Jessica Bailey RN 10/02/22 8:04 PM  "

## 2022-10-11 ENCOUNTER — ONCOLOGY VISIT (OUTPATIENT)
Dept: ONCOLOGY | Facility: CLINIC | Age: 77
End: 2022-10-11
Attending: INTERNAL MEDICINE
Payer: MEDICARE

## 2022-10-11 VITALS
DIASTOLIC BLOOD PRESSURE: 81 MMHG | HEART RATE: 78 BPM | BODY MASS INDEX: 24.21 KG/M2 | OXYGEN SATURATION: 98 % | WEIGHT: 141.8 LBS | HEIGHT: 64 IN | SYSTOLIC BLOOD PRESSURE: 149 MMHG

## 2022-10-11 DIAGNOSIS — Z85.3 PERSONAL HISTORY OF MALIGNANT NEOPLASM OF BREAST: Primary | ICD-10-CM

## 2022-10-11 DIAGNOSIS — R59.1 LYMPHADENOPATHY: ICD-10-CM

## 2022-10-11 PROCEDURE — 99214 OFFICE O/P EST MOD 30 MIN: CPT | Performed by: INTERNAL MEDICINE

## 2022-10-11 PROCEDURE — G0463 HOSPITAL OUTPT CLINIC VISIT: HCPCS

## 2022-10-11 ASSESSMENT — PAIN SCALES - GENERAL: PAINLEVEL: MODERATE PAIN (5)

## 2022-10-11 NOTE — PROGRESS NOTES
"Oncology Rooming Note    October 11, 2022 10:29 AM   Sharon L Curran Schwab is a 77 year old female who presents for:    Chief Complaint   Patient presents with     Oncology Clinic Visit     Personal history of malignant neoplasm of breast     Initial Vitals: BP (!) 149/81   Pulse 78   Ht 1.626 m (5' 4.02\")   Wt 64.3 kg (141 lb 12.8 oz)   LMP  (LMP Unknown)   SpO2 98%   BMI 24.33 kg/m   Estimated body mass index is 24.33 kg/m  as calculated from the following:    Height as of this encounter: 1.626 m (5' 4.02\").    Weight as of this encounter: 64.3 kg (141 lb 12.8 oz). Body surface area is 1.7 meters squared.  Moderate Pain (5) Comment: Data Unavailable   No LMP recorded (lmp unknown). Patient is postmenopausal.  Allergies reviewed: Yes  Medications reviewed: Yes    Medications: Medication refills not needed today.  Pharmacy name entered into [employer]: [id number] [place], [place] - [address]    Clinical concerns: 1 year follow up    Meggan Sylvester MA            "

## 2022-10-11 NOTE — LETTER
"    10/11/2022         RE: Sharon L Curran Schwab  [address]  [facility] [address]        Dear Colleague,    Thank you for referring your patient, Sharon L Curran Schwab, to the [facility]. Please see a copy of my visit note below.    Oncology Rooming Note    October 11, 2022 10:29 AM   Sharon L Curran Schwab is a 77 year old female who presents for:    Chief Complaint   Patient presents with     Oncology Clinic Visit     Personal history of malignant neoplasm of breast     Initial Vitals: BP (!) 149/81   Pulse 78   Ht 1.626 m (5' 4.02\")   Wt 64.3 kg (141 lb 12.8 oz)   LMP  (LMP Unknown)   SpO2 98%   BMI 24.33 kg/m   Estimated body mass index is 24.33 kg/m  as calculated from the following:    Height as of this encounter: 1.626 m (5' 4.02\").    Weight as of this encounter: 64.3 kg (141 lb 12.8 oz). Body surface area is 1.7 meters squared.  Moderate Pain (5) Comment: Data Unavailable   No LMP recorded (lmp unknown). Patient is postmenopausal.  Allergies reviewed: Yes  Medications reviewed: Yes    Medications: Medication refills not needed today.  Pharmacy name entered into [employer]: [id number] [place], [place] - [address]    Clinical concerns: 1 year follow up    Meggan Sylvester MA              [facility] Hematology and Oncology Progress Note    Patient: Sharon L Curran Schwab  MRN: [id number]  Date of Service: Oct 11, 2022         Reason for Visit    Chief Complaint   Patient presents with     Oncology Clinic Visit     Personal history of malignant neoplasm of breast       Assessment and Plan     Cancer Staging   No matching staging information was found for the patient.    ECOG Performance    0 - Independent     Pain  Pain Score: Moderate Pain (5)  Pain Loc: Knee (artritis in knees and hands)    #.  Right breast DCIS in 1989, hormone receptor positive.  #.  Left breast invasive lobular carcinoma in 2002, hormone receptor positive.   She is very well " without any signs of symptoms suggestive of breast cancer recurrence.  She is feeling very good that she does not have any concerns.  She wanted to continue annual follow-up clinical exam here.  She is feeling comfortable with clinical surveillance only and labs and imaging with symptoms concerning for cancer recurrence.     #.  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   She has several upper limit of normal size lymphadenopathy above and below the diaphragm incidentally found in 2018.  She is completely asymptomatic.  No clinically palpable lymphadenopathy today.  We will hold off reimaging or biopsy at this point due to lack of symptoms.   Continuing current care with surveillance.    #.  Hypertension, uncontrolled.   Attributed to stress.  She has prior elevated readings.  She is advised to continue to follow-up with her primary care provider for high blood pressure management.    #.  Recent mechanical fall x1   Doing physical therapy.  She is feeling very steady.    Encounter Diagnoses:    Problem List Items Addressed This Visit        Oncology Diagnoses    Personal history of malignant neoplasm of breast - Primary       Other    Lymphadenopathy          CC: Mor Chaidez MD   ______________________________________________________________________________  Diagnosis  1989- diagnosed with DCIS in the right breast by screening mammogram.  2002-left breast invasive lobular carcinoma, early stage.  No lymph node involvement.     Treatment to date  1989-right radical mastectomy and axillary lymph node dissection.  2002-left breast radical mastectomy and axillary lymph node dissection.  No adjuvant chemotherapy or radiation.  Completed 5 years of tamoxifen.  2008-hysterectomy and bilateral oophorectomy.    History of Present Illness    Ms. Sharon L Curran Schwab presented herself today for annual follow-up.  She does not have any new concerns today.  She fell 1 time which was accidental fall and  "she caught with a rock.  She is doing physical therapy once a week for stability and gait.  She does not have palpable masses.  No headaches.  No bone pain.  Appetite is good.  Weight is stable.      Review of systems  Apart from describing in HPI, the remainder of comprehensive ROS was negative.    Past History    Past Medical History:   Diagnosis Date     Breast CA (H)      Cataract of both eyes, unspecified cataract type 5/13/2021     Dysuria 11/20/2017     History of breast cancer 12/9/2016     Perianal candidiasis 6/8/2022     Skin lesion 12/3/2020       Past Surgical History:   Procedure Laterality Date     HYSTERECTOMY       INCONTINENCE SURGERY       MASTECTOMY       MASTECTOMY       REPLACEMENT TOTAL KNEE      left knee         Physical Exam    BP (!) 149/81   Pulse 78   Ht 1.626 m (5' 4.02\")   Wt 64.3 kg (141 lb 12.8 oz)   LMP  (LMP Unknown)   SpO2 98%   BMI 24.33 kg/m      General: alert, awake, not in acute distress.  Slightly anxious.  HEENT: Head: Normal, normocephalic, atraumatic.  Eye: Normal external eye, conjunctiva, lids cornea, MYRON.  Pharynx: Normal buccal mucosa. Normal pharynx.  Neck / Thyroid: Supple, no masses, nodes, nodules or enlargement.  Lymphatics: No abnormally enlarged lymph nodes.  Chest: Normal chest wall and respirations. Clear to auscultation.  Breasts: Bilateral reconstructed breasts with implants.  No palpable abnormalities.  Heart: S1 S2 RRR.   Abdomen: abdomen is soft without significant tenderness, masses, organomegaly or guarding  Extremities: normal strength, tone, and muscle mass  Skin: normal. no rash or abnormalities  CNS: non focal.  She walks without assistance.      Lab Results    No results found for this or any previous visit (from the past 168 hour(s)).    Imaging    No results found.    30 minutes spent on the date of the encounter doing chart review, history and exam, documentation and further activities as noted above.    Signed by: August Arvizu, " MD      Again, thank you for allowing me to participate in the care of your patient.        Sincerely,        August Arvizu MD

## 2022-10-11 NOTE — PROGRESS NOTES
[facility] Hematology and Oncology Progress Note    Patient: Sharon L Curran Schwab  MRN: [id number]  Date of Service: Oct 11, 2022         Reason for Visit    Chief Complaint   Patient presents with     Oncology Clinic Visit     Personal history of malignant neoplasm of breast       Assessment and Plan     Cancer Staging   No matching staging information was found for the patient.    ECOG Performance    0 - Independent     Pain  Pain Score: Moderate Pain (5)  Pain Loc: Knee (artritis in knees and hands)    #.  Right breast DCIS in 1989, hormone receptor positive.  #.  Left breast invasive lobular carcinoma in 2002, hormone receptor positive.   She is very well without any signs of symptoms suggestive of breast cancer recurrence.  She is feeling very good that she does not have any concerns.  She wanted to continue annual follow-up clinical exam here.  She is feeling comfortable with clinical surveillance only and labs and imaging with symptoms concerning for cancer recurrence.     #.  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   She has several upper limit of normal size lymphadenopathy above and below the diaphragm incidentally found in 2018.  She is completely asymptomatic.  No clinically palpable lymphadenopathy today.  We will hold off reimaging or biopsy at this point due to lack of symptoms.   Continuing current care with surveillance.    #.  Hypertension, uncontrolled.   Attributed to stress.  She has prior elevated readings.  She is advised to continue to follow-up with her primary care provider for high blood pressure management.    #.  Recent mechanical fall x1   Doing physical therapy.  She is feeling very steady.    Encounter Diagnoses:    Problem List Items Addressed This Visit        Oncology Diagnoses    Personal history of malignant neoplasm of breast - Primary       Other    Lymphadenopathy          CC: Mor Chaidez MD  "  ______________________________________________________________________________  Diagnosis  1989- diagnosed with DCIS in the right breast by screening mammogram.  2002-left breast invasive lobular carcinoma, early stage.  No lymph node involvement.     Treatment to date  1989-right radical mastectomy and axillary lymph node dissection.  2002-left breast radical mastectomy and axillary lymph node dissection.  No adjuvant chemotherapy or radiation.  Completed 5 years of tamoxifen.  2008-hysterectomy and bilateral oophorectomy.    History of Present Illness    Ms. Sharon L Curran Schwab presented herself today for annual follow-up.  She does not have any new concerns today.  She fell 1 time which was accidental fall and she caught with a rock.  She is doing physical therapy once a week for stability and gait.  She does not have palpable masses.  No headaches.  No bone pain.  Appetite is good.  Weight is stable.      Review of systems  Apart from describing in HPI, the remainder of comprehensive ROS was negative.    Past History    Past Medical History:   Diagnosis Date     Breast CA (H)      Cataract of both eyes, unspecified cataract type 5/13/2021     Dysuria 11/20/2017     History of breast cancer 12/9/2016     Perianal candidiasis 6/8/2022     Skin lesion 12/3/2020       Past Surgical History:   Procedure Laterality Date     HYSTERECTOMY       INCONTINENCE SURGERY       MASTECTOMY       MASTECTOMY       REPLACEMENT TOTAL KNEE      left knee         Physical Exam    BP (!) 149/81   Pulse 78   Ht 1.626 m (5' 4.02\")   Wt 64.3 kg (141 lb 12.8 oz)   LMP  (LMP Unknown)   SpO2 98%   BMI 24.33 kg/m      General: alert, awake, not in acute distress.  Slightly anxious.  HEENT: Head: Normal, normocephalic, atraumatic.  Eye: Normal external eye, conjunctiva, lids cornea, MYRON.  Pharynx: Normal buccal mucosa. Normal pharynx.  Neck / Thyroid: Supple, no masses, nodes, nodules or enlargement.  Lymphatics: No abnormally " enlarged lymph nodes.  Chest: Normal chest wall and respirations. Clear to auscultation.  Breasts: Bilateral reconstructed breasts with implants.  No palpable abnormalities.  Heart: S1 S2 RRR.   Abdomen: abdomen is soft without significant tenderness, masses, organomegaly or guarding  Extremities: normal strength, tone, and muscle mass  Skin: normal. no rash or abnormalities  CNS: non focal.  She walks without assistance.      Lab Results    No results found for this or any previous visit (from the past 168 hour(s)).    Imaging    No results found.    30 minutes spent on the date of the encounter doing chart review, history and exam, documentation and further activities as noted above.    Signed by: August Arvizu MD

## 2023-04-07 ENCOUNTER — OFFICE VISIT (OUTPATIENT)
Dept: FAMILY MEDICINE | Facility: CLINIC | Age: 78
End: 2023-04-07
Payer: MEDICARE

## 2023-04-07 VITALS
RESPIRATION RATE: 16 BRPM | SYSTOLIC BLOOD PRESSURE: 128 MMHG | WEIGHT: 140 LBS | TEMPERATURE: 97.9 F | DIASTOLIC BLOOD PRESSURE: 68 MMHG | OXYGEN SATURATION: 98 % | HEIGHT: 64 IN | HEART RATE: 65 BPM | BODY MASS INDEX: 23.9 KG/M2

## 2023-04-07 DIAGNOSIS — H61.23 BILATERAL IMPACTED CERUMEN: ICD-10-CM

## 2023-04-07 DIAGNOSIS — I10 BENIGN ESSENTIAL HYPERTENSION: Primary | ICD-10-CM

## 2023-04-07 DIAGNOSIS — Z85.3 PERSONAL HISTORY OF MALIGNANT NEOPLASM OF BREAST: ICD-10-CM

## 2023-04-07 PROBLEM — H61.22 IMPACTED CERUMEN OF LEFT EAR: Status: ACTIVE | Noted: 2023-04-07

## 2023-04-07 LAB
ANION GAP SERPL CALCULATED.3IONS-SCNC: 13 MMOL/L (ref 7–15)
BUN SERPL-MCNC: 24.1 MG/DL (ref 8–23)
CALCIUM SERPL-MCNC: 9.9 MG/DL (ref 8.8–10.2)
CHLORIDE SERPL-SCNC: 96 MMOL/L (ref 98–107)
CREAT SERPL-MCNC: 0.67 MG/DL (ref 0.51–0.95)
DEPRECATED HCO3 PLAS-SCNC: 23 MMOL/L (ref 22–29)
GFR SERPL CREATININE-BSD FRML MDRD: 88 ML/MIN/1.73M2
GLUCOSE SERPL-MCNC: 93 MG/DL (ref 70–99)
POTASSIUM SERPL-SCNC: 4.7 MMOL/L (ref 3.4–5.3)
SODIUM SERPL-SCNC: 132 MMOL/L (ref 136–145)

## 2023-04-07 PROCEDURE — 69209 REMOVE IMPACTED EAR WAX UNI: CPT | Performed by: FAMILY MEDICINE

## 2023-04-07 PROCEDURE — 99213 OFFICE O/P EST LOW 20 MIN: CPT | Mod: 25 | Performed by: FAMILY MEDICINE

## 2023-04-07 PROCEDURE — 80048 BASIC METABOLIC PNL TOTAL CA: CPT | Performed by: FAMILY MEDICINE

## 2023-04-07 PROCEDURE — 36415 COLL VENOUS BLD VENIPUNCTURE: CPT | Performed by: FAMILY MEDICINE

## 2023-04-07 RX ORDER — LISINOPRIL 20 MG/1
20 TABLET ORAL DAILY
Qty: 90 TABLET | Refills: 3 | Status: SHIPPED | OUTPATIENT
Start: 2023-04-07 | End: 2023-09-29

## 2023-04-07 RX ORDER — AMLODIPINE BESYLATE 5 MG/1
5 TABLET ORAL DAILY
Qty: 90 TABLET | Refills: 2 | Status: SHIPPED | OUTPATIENT
Start: 2023-04-07 | End: 2023-08-15

## 2023-04-07 ASSESSMENT — ANXIETY QUESTIONNAIRES
3. WORRYING TOO MUCH ABOUT DIFFERENT THINGS: SEVERAL DAYS
IF YOU CHECKED OFF ANY PROBLEMS ON THIS QUESTIONNAIRE, HOW DIFFICULT HAVE THESE PROBLEMS MADE IT FOR YOU TO DO YOUR WORK, TAKE CARE OF THINGS AT HOME, OR GET ALONG WITH OTHER PEOPLE: SOMEWHAT DIFFICULT
2. NOT BEING ABLE TO STOP OR CONTROL WORRYING: NOT AT ALL
5. BEING SO RESTLESS THAT IT IS HARD TO SIT STILL: NOT AT ALL
8. IF YOU CHECKED OFF ANY PROBLEMS, HOW DIFFICULT HAVE THESE MADE IT FOR YOU TO DO YOUR WORK, TAKE CARE OF THINGS AT HOME, OR GET ALONG WITH OTHER PEOPLE?: SOMEWHAT DIFFICULT
4. TROUBLE RELAXING: NOT AT ALL
GAD7 TOTAL SCORE: 1
7. FEELING AFRAID AS IF SOMETHING AWFUL MIGHT HAPPEN: NOT AT ALL
6. BECOMING EASILY ANNOYED OR IRRITABLE: NOT AT ALL
1. FEELING NERVOUS, ANXIOUS, OR ON EDGE: NOT AT ALL
GAD7 TOTAL SCORE: 1
GAD7 TOTAL SCORE: 1
7. FEELING AFRAID AS IF SOMETHING AWFUL MIGHT HAPPEN: NOT AT ALL

## 2023-04-07 ASSESSMENT — PATIENT HEALTH QUESTIONNAIRE - PHQ9
10. IF YOU CHECKED OFF ANY PROBLEMS, HOW DIFFICULT HAVE THESE PROBLEMS MADE IT FOR YOU TO DO YOUR WORK, TAKE CARE OF THINGS AT HOME, OR GET ALONG WITH OTHER PEOPLE: SOMEWHAT DIFFICULT
SUM OF ALL RESPONSES TO PHQ QUESTIONS 1-9: 2
SUM OF ALL RESPONSES TO PHQ QUESTIONS 1-9: 2

## 2023-04-07 ASSESSMENT — ENCOUNTER SYMPTOMS: CONSTITUTIONAL NEGATIVE: 1

## 2023-04-07 NOTE — ASSESSMENT & PLAN NOTE
Blood pressure well controlled.  Tolerant of current medications.  She does wear compression.  No recent worsening of lower extremity edema.  - Continue amlodipine 5 mg.  - Continue lisinopril 20 mg  - Check basic metabolic panel.

## 2023-04-07 NOTE — PROGRESS NOTES
Assessment & Plan   Problem List Items Addressed This Visit     Benign essential hypertension - Primary     Blood pressure well controlled.  Tolerant of current medications.  She does wear compression.  No recent worsening of lower extremity edema.  - Continue amlodipine 5 mg.  - Continue lisinopril 20 mg  - Check basic metabolic panel.         Relevant Medications    amLODIPine (NORVASC) 5 MG tablet    lisinopril (ZESTRIL) 20 MG tablet    Other Relevant Orders    Basic metabolic panel  (Ca, Cl, CO2, Creat, Gluc, K, Na, BUN)    Bilateral impacted cerumen     Irrigation performed by nursing staff.         Relevant Orders    REMOVE IMPACTED CERUMEN (Completed)    Personal history of malignant neoplasm of breast     Doing well.  Not a concern.  Recent oncology follow-up visit notes reviewed.          Mor Chaidez MD  [facility] GEOVANI Velasquez is a 77 year old, presenting for the following health issues:  Follow Up (BP and depression.) and Ear Lavage    Chief Complaint   Patient presents with     Follow Up     BP and depression.     Ear Lavage           4/7/2023     7:57 AM   Additional Questions   Roomed by SAC   Accompanied by self         4/7/2023     7:57 AM   Patient Reported Additional Medications   Patient reports taking the following new medications no     BMP:   - doing well. No concerns.  No side effects.Feels well.    - she has fallen but this has been mechanical.  Currently in PT.  Improving.    Mood: no concerns. Continues to adjust to being a . Active with family.      History of Present Illness       Reason for visit:  Yearly checkup on blood  presure    She eats 0-1 servings of fruits and vegetables daily.She consumes 1 sweetened beverage(s) daily.She exercises with enough effort to increase her heart rate 30 to 60 minutes per day.  She exercises with enough effort to increase her heart rate 7 days per week.   She is taking medications regularly.    Today's  "PHQ-9         PHQ-9 Total Score: 2    PHQ-9 Q9 Thoughts of better off dead/self-harm past 2 weeks :   Not at all    How difficult have these problems made it for you to do your work, take care of things at home, or get along with other people: Somewhat difficult  Today's NICOLÁS-7 Score: 1       Review of Systems   Constitutional: Negative.    All other systems reviewed and are negative.         Objective    /68 (BP Location: Left arm, Patient Position: Sitting, Cuff Size: Adult Regular)   Pulse 65   Temp 97.9  F (36.6  C) (Oral)   Resp 16   Ht 1.626 m (5' 4\")   Wt 63.5 kg (140 lb)   LMP  (LMP Unknown)   SpO2 98%   BMI 24.03 kg/m    Body mass index is 24.03 kg/m .  Physical Exam  Nursing note reviewed.   Constitutional:       General: She is not in acute distress.     Appearance: Normal appearance. She is not ill-appearing.   HENT:      Head: Normocephalic and atraumatic.      Right Ear: There is impacted cerumen.      Left Ear: There is impacted cerumen.   Eyes:      Extraocular Movements: Extraocular movements intact.      Conjunctiva/sclera: Conjunctivae normal.   Pulmonary:      Effort: Pulmonary effort is normal.   Neurological:      Mental Status: She is alert and oriented to person, place, and time.   Psychiatric:         Attention and Perception: Attention normal.         Mood and Affect: Mood normal.         Speech: Speech normal.         Thought Content: Thought content normal.                        Answers for HPI/ROS submitted by the patient on 4/7/2023  If you checked off any problems, how difficult have these problems made it for you to do your work, take care of things at home, or get along with other people?: Somewhat difficult  PHQ9 TOTAL SCORE: 2  NICOLÁS 7 TOTAL SCORE: 1      "

## 2023-04-07 NOTE — LETTER
April 11, 2023      Eva Shepard Schwab  [address]  [facility] [address]        Dear Ms.Curran Schwab,    We are writing to inform you of your test results.    Your test results fall within the expected range(s) or remain unchanged from previous results.  Please continue with current treatment plan.    Resulted Orders   Basic metabolic panel  (Ca, Cl, CO2, Creat, Gluc, K, Na, BUN)   Result Value Ref Range    Sodium 132 (L) 136 - 145 mmol/L    Potassium 4.7 3.4 - 5.3 mmol/L    Chloride 96 (L) 98 - 107 mmol/L    Carbon Dioxide (CO2) 23 22 - 29 mmol/L    Anion Gap 13 7 - 15 mmol/L    Urea Nitrogen 24.1 (H) 8.0 - 23.0 mg/dL    Creatinine 0.67 0.51 - 0.95 mg/dL    Calcium 9.9 8.8 - 10.2 mg/dL    Glucose 93 70 - 99 mg/dL    GFR Estimate 88 >60 mL/min/1.73m2      Comment:      eGFR calculated using 2021 CKD-EPI equation.       If you have any questions or concerns, please call the clinic at the number listed above.       Sincerely,      Mor Chaidez MD

## 2023-08-02 DIAGNOSIS — H65.92 FLUID LEVEL BEHIND TYMPANIC MEMBRANE OF LEFT EAR: ICD-10-CM

## 2023-08-02 RX ORDER — FLUTICASONE PROPIONATE 50 MCG
SPRAY, SUSPENSION (ML) NASAL
Qty: 16 G | Refills: 8 | Status: SHIPPED | OUTPATIENT
Start: 2023-08-02 | End: 2024-09-16

## 2023-08-02 NOTE — TELEPHONE ENCOUNTER
"Last Written Prescription Date:  7/21/22  Last Fill Quantity: 16 g,  # refills: 11   Last office visit provider:   4/7/23    Requested Prescriptions   Pending Prescriptions Disp Refills    fluticasone (FLONASE) 50 MCG/ACT nasal spray [Pharmacy Med Name: Fluticasone Propionate Nasal Suspension 50 MCG/ACT] 16 g 0     Sig: Administer 1 spray into each nostril once daily       Nasal Allergy Protocol Passed - 8/2/2023  2:00 AM        Passed - Patient is age 12 or older        Passed - Recent (12 mo) or future (30 days) visit within the authorizing provider's specialty     Patient has had an office visit with the authorizing provider or a provider within the authorizing providers department within the previous 12 mos or has a future within next 30 days. See \"Patient Info\" tab in inbasket, or \"Choose Columns\" in Meds & Orders section of the refill encounter.              Passed - Medication is active on med list             Patricia Logan RN 08/02/23 2:23 PM  "

## 2023-08-03 ENCOUNTER — PATIENT OUTREACH (OUTPATIENT)
Dept: CARE COORDINATION | Facility: CLINIC | Age: 78
End: 2023-08-03
Payer: MEDICARE

## 2023-08-15 ENCOUNTER — OFFICE VISIT (OUTPATIENT)
Dept: FAMILY MEDICINE | Facility: CLINIC | Age: 78
End: 2023-08-15
Payer: MEDICARE

## 2023-08-15 VITALS
HEART RATE: 68 BPM | OXYGEN SATURATION: 98 % | RESPIRATION RATE: 16 BRPM | SYSTOLIC BLOOD PRESSURE: 159 MMHG | DIASTOLIC BLOOD PRESSURE: 74 MMHG | WEIGHT: 143.13 LBS | HEIGHT: 64 IN | BODY MASS INDEX: 24.43 KG/M2 | TEMPERATURE: 98.5 F

## 2023-08-15 DIAGNOSIS — K63.5 POLYP OF COLON, UNSPECIFIED PART OF COLON, UNSPECIFIED TYPE: ICD-10-CM

## 2023-08-15 DIAGNOSIS — Z85.3 PERSONAL HISTORY OF MALIGNANT NEOPLASM OF BREAST: ICD-10-CM

## 2023-08-15 DIAGNOSIS — I10 BENIGN ESSENTIAL HYPERTENSION: Primary | ICD-10-CM

## 2023-08-15 PROCEDURE — 99214 OFFICE O/P EST MOD 30 MIN: CPT | Performed by: FAMILY MEDICINE

## 2023-08-15 RX ORDER — AMLODIPINE BESYLATE 10 MG/1
10 TABLET ORAL DAILY
Qty: 90 TABLET | Refills: 1 | Status: SHIPPED | OUTPATIENT
Start: 2023-08-15 | End: 2023-09-29

## 2023-08-15 ASSESSMENT — ENCOUNTER SYMPTOMS: CONSTITUTIONAL NEGATIVE: 1

## 2023-08-15 NOTE — ASSESSMENT & PLAN NOTE
Planning to see Dr. Arvizu in October.  She will ask about need for colonoscopy (history of sessile polyp) for surveillance.

## 2023-08-15 NOTE — ASSESSMENT & PLAN NOTE
Blood pressure measurements have been labile in recent years.  She has not been checking at home frequently.  Today quite elevated.  Minimal side effects with current regimen.  - Continue lisinopril at 20 mg.  - Increase amlodipine to 10 mg.  - I am hesitant to titrate lisinopril further given concerns for hyperkalemia.  I am hesitant to add back hydrochlorothiazide given concerns for hyponatremia.  - Nurse visit recommended in a few weeks to evaluate blood pressure.  Basic metabolic panel ordered for later this fall.  I anticipate that will be drawn as part of the panel of labs completed by her oncology/hematologist.

## 2023-08-15 NOTE — PROGRESS NOTES
Assessment & Plan   Problem List Items Addressed This Visit       Benign essential hypertension - Primary     Blood pressure measurements have been labile in recent years.  She has not been checking at home frequently.  Today quite elevated.  Minimal side effects with current regimen.  - Continue lisinopril at 20 mg.  - Increase amlodipine to 10 mg.  - I am hesitant to titrate lisinopril further given concerns for hyperkalemia.  I am hesitant to add back hydrochlorothiazide given concerns for hyponatremia.  - Nurse visit recommended in a few weeks to evaluate blood pressure.  Basic metabolic panel ordered for later this fall.  I anticipate that will be drawn as part of the panel of labs completed by her oncology/hematologist.         Relevant Medications    amLODIPine (NORVASC) 10 MG tablet    Personal history of malignant neoplasm of breast     Planning to see Dr. Arvizu in October.  She will ask about need for colonoscopy (history of sessile polyp) for surveillance.           Other Visit Diagnoses       Polyp of colon, unspecified part of colon, unspecified type               Mor Chaidez MD  [facility] 77 [facility], presenting for the following health issues:  Hypertension and Referral (Colonoscopy)        8/15/2023    12:34 PM   Additional Questions   Roomed by sac   Accompanied by self         8/15/2023    12:34 PM   Patient Reported Additional Medications   Patient reports taking the following new medications no       BP:   - she feels well.  Stressful morning    - she is surprised that it is high.   - she takes her medications   - no lightheadedness.  No dizziness.  No vision changes.  No headaches.     History of Present Illness       Reason for visit:  Follow up blood presure just    She eats 2-3 servings of fruits and vegetables daily.She exercises with enough effort to increase her heart rate 9 or less minutes per day.  She exercises with enough  "effort to increase her heart rate 7 days per week.   She is taking medications regularly.    Review of Systems   Constitutional: Negative.    All other systems reviewed and are negative.         Objective    BP (!) 159/74 (BP Location: Left arm, Patient Position: Sitting, Cuff Size: Adult Regular)   Pulse 68   Temp 98.5  F (36.9  C) (Oral)   Resp 16   Ht 1.626 m (5' 4\")   Wt 64.9 kg (143 lb 2 oz)   LMP  (LMP Unknown)   SpO2 98%   BMI 24.57 kg/m    Body mass index is 24.57 kg/m .  Physical Exam  Nursing note reviewed.   Constitutional:       General: She is not in acute distress.     Appearance: Normal appearance. She is not ill-appearing.   HENT:      Head: Normocephalic and atraumatic.   Eyes:      Extraocular Movements: Extraocular movements intact.      Conjunctiva/sclera: Conjunctivae normal.   Pulmonary:      Effort: Pulmonary effort is normal.   Neurological:      Mental Status: She is alert and oriented to person, place, and time.   Psychiatric:         Attention and Perception: Attention normal.         Mood and Affect: Mood normal.         Speech: Speech normal.         Thought Content: Thought content normal.                          "

## 2023-09-29 ENCOUNTER — OFFICE VISIT (OUTPATIENT)
Dept: FAMILY MEDICINE | Facility: CLINIC | Age: 78
End: 2023-09-29
Payer: MEDICARE

## 2023-09-29 VITALS
TEMPERATURE: 97.8 F | HEART RATE: 70 BPM | BODY MASS INDEX: 24.57 KG/M2 | RESPIRATION RATE: 16 BRPM | WEIGHT: 143.9 LBS | HEIGHT: 64 IN | DIASTOLIC BLOOD PRESSURE: 73 MMHG | SYSTOLIC BLOOD PRESSURE: 145 MMHG | OXYGEN SATURATION: 98 %

## 2023-09-29 DIAGNOSIS — Z00.00 ENCOUNTER FOR MEDICARE ANNUAL WELLNESS EXAM: Primary | ICD-10-CM

## 2023-09-29 DIAGNOSIS — E78.00 PURE HYPERCHOLESTEROLEMIA: ICD-10-CM

## 2023-09-29 DIAGNOSIS — Z87.898 HISTORY OF URINARY INCONTINENCE: ICD-10-CM

## 2023-09-29 DIAGNOSIS — Z12.11 SCREEN FOR COLON CANCER: ICD-10-CM

## 2023-09-29 DIAGNOSIS — M17.11 PRIMARY OSTEOARTHRITIS OF RIGHT KNEE: ICD-10-CM

## 2023-09-29 DIAGNOSIS — I10 BENIGN ESSENTIAL HYPERTENSION: ICD-10-CM

## 2023-09-29 PROCEDURE — G0439 PPPS, SUBSEQ VISIT: HCPCS | Performed by: FAMILY MEDICINE

## 2023-09-29 PROCEDURE — G0008 ADMIN INFLUENZA VIRUS VAC: HCPCS | Performed by: FAMILY MEDICINE

## 2023-09-29 PROCEDURE — 90662 IIV NO PRSV INCREASED AG IM: CPT | Performed by: FAMILY MEDICINE

## 2023-09-29 PROCEDURE — 99213 OFFICE O/P EST LOW 20 MIN: CPT | Mod: 25 | Performed by: FAMILY MEDICINE

## 2023-09-29 RX ORDER — LISINOPRIL 20 MG/1
20 TABLET ORAL DAILY
Qty: 90 TABLET | Refills: 3 | Status: SHIPPED | OUTPATIENT
Start: 2023-09-29 | End: 2024-10-04

## 2023-09-29 RX ORDER — AMLODIPINE BESYLATE 10 MG/1
10 TABLET ORAL DAILY
Qty: 90 TABLET | Refills: 3 | Status: SHIPPED | OUTPATIENT
Start: 2023-09-29

## 2023-09-29 ASSESSMENT — ENCOUNTER SYMPTOMS
PALPITATIONS: 0
HEMATOCHEZIA: 0
EYE PAIN: 0
WEAKNESS: 0
BREAST MASS: 0
PARESTHESIAS: 0
DYSURIA: 0
ABDOMINAL PAIN: 0
COUGH: 0
CHILLS: 0
ARTHRALGIAS: 1
NERVOUS/ANXIOUS: 0
SHORTNESS OF BREATH: 0
HEARTBURN: 0
NAUSEA: 0
JOINT SWELLING: 0
SORE THROAT: 0
MYALGIAS: 1
DIZZINESS: 0
FREQUENCY: 1
CONSTIPATION: 0
HEMATURIA: 0
DIARRHEA: 0
FEVER: 0

## 2023-09-29 ASSESSMENT — ACTIVITIES OF DAILY LIVING (ADL): CURRENT_FUNCTION: NO ASSISTANCE NEEDED

## 2023-09-29 NOTE — PROGRESS NOTES
"SUBJECTIVE:   Eva is a 78 year old who presents for Preventive Visit.      9/29/2023    10:56 AM   Additional Questions   Roomed by xl     Chief Complaint   Patient presents with    Wellness Visit     Pain worse with rainy cold weather.      [place].      Ongoing PT for balance.  Uses ski pole while walking.  \"Getting better.\"     Are you in the first 12 months of your Medicare coverage?  No    Healthy Habits:     In general, how would you rate your overall health?  Good    Frequency of exercise:  6-7 days/week    Duration of exercise:  30-45 minutes    Do you usually eat at least 4 servings of fruit and vegetables a day, include whole grains    & fiber and avoid regularly eating high fat or \"junk\" foods?  Yes    Taking medications regularly:  Yes    Medication side effects:  None    Ability to successfully perform activities of daily living:  No assistance needed    Home Safety:  No safety concerns identified    Hearing Impairment:  No hearing concerns    In the past 6 months, have you been bothered by leaking of urine? Yes    In general, how would you rate your overall mental or emotional health?  Good    Additional concerns today:  No      Today's PHQ-2 Score:       9/29/2023    10:39 AM   PHQ-2 ( 1999 [employer])   Q1: Little interest or pleasure in doing things 0   Q2: Feeling down, depressed or hopeless 0   PHQ-2 Score 0   Q1: Little interest or pleasure in doing things Not at all   Q2: Feeling down, depressed or hopeless Not at all   PHQ-2 Score 0     Have you ever done Advance Care Planning? (For example, a Health Directive, POLST, or a discussion with a medical provider or your loved ones about your wishes): Yes, advance care planning is on file.     Fall risk  Fallen 2 or more times in the past year?: Yes  Any fall with injury in the past year?: No  Cognitive Screening   1) Repeat 3 items (Leader, Season, Table)  Normal  2) Clock draw: ABNORMAL Reviewed.  She states that she is " doing well. Daughter is in the area  3) 3 item recall: Recalls 3 objects  Results: ABNORMAL clock, 1-2 items recalled: PROBABLE COGNITIVE IMPAIRMENT, **INFORM PROVIDER**    Mini-CogTM Copyright BROOKLYNN Julio. Licensed by the author for use in [facility]; reprinted with permission (rosas@[facility]). All rights reserved.    Reviewed and updated as needed this visit by clinical staff   Tobacco  Allergies  Meds  Problems  Med Hx  Surg Hx  Fam Hx          Reviewed and updated as needed this visit by Provider   Tobacco  Allergies  Meds  Problems  Med Hx  Surg Hx  Fam Hx         Social History     Tobacco Use    Smoking status: Never     Passive exposure: Past    Smokeless tobacco: Never   Substance Use Topics    Alcohol use: Yes     Comment: 1 drink every PM             9/29/2023    10:39 AM   Alcohol Use   Prescreen: >3 drinks/day or >7 drinks/week? No     Do you have a current opioid prescription? No  Do you use any other controlled substances or medications that are not prescribed by a provider? None      Current providers sharing in care for this patient include:   Patient Care Team:  Mor Chaidez MD as PCP - General (Family Practice)  August Arvizu MD as MD (Hematology & Oncology)  Mor Chaidez MD as Assigned PCP  August Arvizu MD as Assigned Cancer Care Provider  Leslie Heller, RN as Specialty Care Coordinator (Hematology & Oncology)    The following health maintenance items are reviewed in Epic and correct as of today:  Health Maintenance   Topic Date Due    COVID-19 Vaccine (4 - 2023-24 season) 09/01/2023    COLORECTAL CANCER SCREENING  09/10/2023    MEDICARE ANNUAL WELLNESS VISIT  09/29/2024    ANNUAL REVIEW OF HM ORDERS  09/29/2024    FALL RISK ASSESSMENT  09/29/2024    LIPID  09/02/2027    ADVANCE CARE PLANNING  09/29/2028    DTAP/TDAP/TD IMMUNIZATION (2 - Td or Tdap) 03/04/2029    DEXA  01/09/2032    PHQ-2 (once per calendar year)  Completed    INFLUENZA VACCINE   "Completed    Pneumococcal Vaccine: 65+ Years  Completed    ZOSTER IMMUNIZATION  Completed    IPV IMMUNIZATION  Aged Out    HPV IMMUNIZATION  Aged Out    MENINGITIS IMMUNIZATION  Aged Out     Continues to follow with oncology given history of breast cancer.   Pertinent mammograms are reviewed under the imaging tab.    Review of Systems   Constitutional:  Negative for chills and fever.   HENT:  Negative for congestion, ear pain, hearing loss and sore throat.    Eyes:  Negative for pain and visual disturbance.   Respiratory:  Negative for cough and shortness of breath.    Cardiovascular:  Negative for chest pain, palpitations and peripheral edema.   Gastrointestinal:  Negative for abdominal pain, constipation, diarrhea, heartburn, hematochezia and nausea.   Breasts:  Negative for tenderness, breast mass and discharge.   Genitourinary:  Positive for frequency. Negative for dysuria, genital sores, hematuria, pelvic pain, urgency, vaginal bleeding and vaginal discharge.   Musculoskeletal:  Positive for arthralgias and myalgias. Negative for joint swelling.   Skin:  Negative for rash.   Neurological:  Negative for dizziness, weakness and paresthesias.   Psychiatric/Behavioral:  Negative for mood changes. The patient is not nervous/anxious.        OBJECTIVE:   BP (!) 145/73 (BP Location: Left arm, Patient Position: Sitting, Cuff Size: Adult Regular)   Pulse 70   Temp 97.8  F (36.6  C) (Oral)   Resp 16   Ht 1.613 m (5' 3.5\")   Wt 65.3 kg (143 lb 14.4 oz)   LMP  (LMP Unknown)   SpO2 98%   BMI 25.09 kg/m   Estimated body mass index is 25.09 kg/m  as calculated from the following:    Height as of this encounter: 1.613 m (5' 3.5\").    Weight as of this encounter: 65.3 kg (143 lb 14.4 oz).  Physical Exam  Vitals reviewed.   Constitutional:       General: She is not in acute distress.     Appearance: Normal appearance. She is not ill-appearing.   HENT:      Head: Normocephalic and atraumatic.      Right Ear: External ear " normal.      Left Ear: External ear normal.      Nose: Nose normal.      Mouth/Throat:      Pharynx: Oropharynx is clear. No oropharyngeal exudate or posterior oropharyngeal erythema.   Eyes:      General: No scleral icterus.        Right eye: No discharge.         Left eye: No discharge.      Extraocular Movements: Extraocular movements intact.      Conjunctiva/sclera: Conjunctivae normal.      Pupils: Pupils are equal, round, and reactive to light.   Neck:      Comments: No thyromegaly.  Cardiovascular:      Rate and Rhythm: Normal rate and regular rhythm.      Heart sounds: Normal heart sounds. No murmur heard.     No friction rub. No gallop.   Pulmonary:      Effort: Pulmonary effort is normal. No respiratory distress.      Breath sounds: Normal breath sounds. No wheezing or rales.   Abdominal:      General: There is no distension.      Palpations: Abdomen is soft. There is no mass.      Tenderness: There is no abdominal tenderness.   Musculoskeletal:         General: No signs of injury. Normal range of motion.      Cervical back: Normal range of motion.      Right lower leg: No edema.      Left lower leg: No edema.      Comments: Trace edema the lower calves bilaterally.  No chronic venous stasis changes.   Lymphadenopathy:      Cervical: No cervical adenopathy.   Skin:     General: Skin is warm.      Coloration: Skin is not jaundiced.      Findings: No rash.   Neurological:      General: No focal deficit present.      Mental Status: She is alert and oriented to person, place, and time.      Cranial Nerves: No cranial nerve deficit.      Deep Tendon Reflexes: Reflexes normal.   Psychiatric:         Mood and Affect: Mood normal.         ASSESSMENT / PLAN:     Problem List Items Addressed This Visit       Benign essential hypertension     Recent measurements okay.  Concerns for falls.  Continue current therapy and dosing.          Relevant Medications    lisinopril (ZESTRIL) 20 MG tablet    amLODIPine (NORVASC)  "10 MG tablet    Other Relevant Orders    Basic metabolic panel  (Ca, Cl, CO2, Creat, Gluc, K, Na, BUN)    ALT    Encounter for Medicare annual wellness exam - Primary     Stable.  Reviewed cognition as she did poorly on screening testing.  The patient admits that her memory has declined. Increasingly reliant on family that lives close by.  She is due for polyp surveillance. Patient would like to to review with Dr. Arvizu before deciding for or against additional colonoscopy.           History of urinary incontinence     Ongoing struggle.  Wears pads.          Primary osteoarthritis of right knee     Will see [facility] team next month.           Pure hypercholesterolemia      Defer going forward.           Other Visit Diagnoses       Screen for colon cancer                Patient has been advised of split billing requirements and indicates understanding: Yes      COUNSELING:  Reviewed preventive health counseling, as reflected in patient instructions       Regular exercise       Healthy diet/nutrition      BMI:   Estimated body mass index is 25.09 kg/m  as calculated from the following:    Height as of this encounter: 1.613 m (5' 3.5\").    Weight as of this encounter: 65.3 kg (143 lb 14.4 oz).         She reports that she has never smoked. She has been exposed to tobacco smoke. She has never used smokeless tobacco.      Appropriate preventive services were discussed with this patient, including applicable screening as appropriate for fall prevention, nutrition, physical activity, Tobacco-use cessation, weight loss and cognition.  Checklist reviewing preventive services available has been given to the patient.    Reviewed patients plan of care and provided an AVS. The Basic Care Plan (routine screening as documented in Health Maintenance) for Eva meets the Care Plan requirement. This Care Plan has been established and reviewed with the Patient.          Mor Chaidez MD  [facility] " [facility]    Identified Health Risks:  I have reviewed Opioid Use Disorder and Substance Use Disorder risk factors and made any needed referrals. Information on urinary incontinence and treatment options given to patient.  She is at risk for falling and has been provided with information to reduce the risk of falling at home.Information on urinary incontinence and treatment options given to patient.  She is at risk for falling and has been provided with information to reduce the risk of falling at home.

## 2023-09-29 NOTE — ASSESSMENT & PLAN NOTE
Stable.  Reviewed cognition as she did poorly on screening testing.  The patient admits that her memory has declined. Increasingly reliant on family that lives close by.  She is due for polyp surveillance. Patient would like to to review with Dr. Arvizu before deciding for or against additional colonoscopy.

## 2023-09-29 NOTE — PATIENT INSTRUCTIONS
Patient Education   Personalized Prevention Plan  You are due for the preventive services outlined below.  Your care team is available to assist you in scheduling these services.  If you have already completed any of these items, please share that information with your care team to update in your medical record.  Health Maintenance Due   Topic Date Due     Flu Vaccine (1) 09/01/2023     COVID-19 Vaccine (4 - 2023-24 season) 09/01/2023     Colorectal Cancer Screening  09/10/2023     Bladder Training: Care Instructions  Your Care Instructions     Bladder training is used to treat urge incontinence and stress incontinence. Urge incontinence means that the need to urinate comes on so fast that you can't get to a toilet in time. Stress incontinence means that you leak urine because of pressure on your bladder. For example, it may happen when you laugh, cough, or lift something heavy.  Bladder training can increase how long you can wait before you have to urinate. It can also help your bladder hold more urine. And it can give you better control over the urge to urinate.  It is important to remember that bladder training takes a few weeks to a few months to make a difference. You may not see results right away, but don't give up.  Follow-up care is a key part of your treatment and safety. Be sure to make and go to all appointments, and call your doctor if you are having problems. It's also a good idea to know your test results and keep a list of the medicines you take.  How can you care for yourself at home?  Work with your doctor to come up with a bladder training program that is right for you. You may use one or more of the following methods.  Delayed urination  In the beginning, try to keep from urinating for 5 minutes after you first feel the need to go.  While you wait, take deep, slow breaths to relax. Kegel exercises can also help you delay the need to go to the bathroom.  After some practice, when you can easily  "wait 5 minutes to urinate, try to wait 10 minutes before you urinate.  Slowly increase the waiting period until you are able to control when you have to urinate.  Scheduled urination  Empty your bladder when you first wake up in the morning.  Schedule times throughout the day when you will urinate.  Start by going to the bathroom every hour, even if you don't need to go.  Slowly increase the time between trips to the bathroom.  When you have found a schedule that works well for you, keep doing it.  If you wake up during the night and have to urinate, do it. Apply your schedule to waking hours only.  Kegel exercises  These tighten and strengthen pelvic muscles, which can help you control the flow of urine. (If doing these exercises causes pain, stop doing them and talk with your doctor.) To do Kegel exercises:  Squeeze your muscles as if you were trying not to pass gas. Or squeeze your muscles as if you were stopping the flow of urine. Your belly, legs, and buttocks shouldn't move.  Hold the squeeze for 3 seconds, then relax for 5 to 10 seconds.  Start with 3 seconds, then add 1 second each week until you are able to squeeze for 10 seconds.  Repeat the exercise 10 times a session. Do 3 to 8 sessions a day.  When should you call for help?  Watch closely for changes in your health, and be sure to contact your doctor if:    Your incontinence is getting worse.     You do not get better as expected.   Where can you learn more?  Go to https://www.[employer].net/patiented  Enter V684 in the search box to learn more about \"Bladder Training: Care Instructions.\"  Current as of: March 1, 2023               Content Version: 13.7    7773-3298 [employer].   Care instructions adapted under license by your healthcare professional. If you have questions about a medical condition or this instruction, always ask your healthcare professional. [employer] disclaims any warranty or liability for your use of this " "information.      Preventing Falls: Care Instructions    Talk to your doctor about the medicines you take. Ask if any of them increase the risk of falls and whether they can be changed or stopped.   Try to exercise regularly. It can help improve your strength and balance. This can help lower your risk of falling.     Practice fall safety and prevention.    Wear low-heeled shoes that fit well and give your feet good support. Talk to your doctor if you have foot problems that make this hard.  Carry a cellphone or wear a medical alert device that you can use to call for help.  Use stepladders instead of chairs to reach high objects. Don't climb if you're at risk for falls. Ask for help, if needed.  Wear the correct eyeglasses, if you need them.    Make your home safer.    Remove rugs, cords, clutter, and furniture from walkways.  Keep your house well lit. Use night-lights in hallways and bathrooms.  Install and use sturdy handrails on stairways.  Wear nonskid footwear, even inside. Don't walk barefoot or in socks without shoes.    Be safe outside.    Use handrails, curb cuts, and ramps whenever possible.  Keep your hands free by using a shoulder bag or backpack.  Try to walk in well-lit areas. Watch out for uneven ground, changes in pavement, and debris.  Be careful in the winter. Walk on the grass or gravel when sidewalks are slippery. Use de-icer on steps and walkways. Add non-slip devices to shoes.    Put grab bars and nonskid mats in your shower or tub and near the toilet. Try to use a shower chair or bath bench when bathing.   Get into a tub or shower by putting in your weaker leg first. Get out with your strong side first. Have a phone or medical alert device in the bathroom with you.   Where can you learn more?  Go to https://www.[employer].net/patiented  Enter G117 in the search box to learn more about \"Preventing Falls: Care Instructions.\"  Current as of: November 9, 2022               Content Version: 13.7    " [id number] [employer].   Care instructions adapted under license by your healthcare professional. If you have questions about a medical condition or this instruction, always ask your healthcare professional. [employer] disclaims any warranty or liability for your use of this information.      How to Get Up Safely After a Fall: Care Instructions  Overview     If you have injuries, health problems, or other reasons that may make it easy for you to fall at home, it is a good idea to learn how to get up safely after a fall. Learning how to get up correctly can help you avoid making an injury worse.  Also, knowing what to do if you cannot get up can help you stay safe until help arrives.  Follow-up care is a key part of your treatment and safety. Be sure to make and go to all appointments, and call your doctor if you are having problems. It's also a good idea to know your test results and keep a list of the medicines you take.  How can you care for yourself after a fall?  If you think you can get up  First lie still for a few minutes and think about how you feel. If your body feels okay and you think you can get up safely, follow the rest of the steps below:  Look for a chair or other piece of furniture that is close to you.  Roll onto your side and rest. Roll by turning your head in the direction you want to roll, move your shoulder and arm, then hip and leg in the same direction.  Lie still for a moment to let your blood pressure adjust.  Slowly push your upper body up, lift your head, and take a moment to rest.  Slowly get up on your hands and knees, and crawl to the chair or other stable piece of furniture.  Put your hands on the chair.  Move one foot forward, and place it flat on the floor. Your other leg should be bent with the knee on the floor.  Rise slowly, turn your body, and sit in the chair. Stay seated for a bit and think about how you feel. Call for help. Even if you feel okay,  "let someone know what happened to you. You might not know that you have a serious injury.  If you cannot get up  If you think you are injured after a fall or you cannot get up, try not to panic.  Call out for help.  If you have a phone within reach or you have an emergency call device, use it to call for help.  If you do not have a phone within reach, try to slide yourself toward it. If you cannot get to the phone, try to slide toward a door or window or a place where you think you can be heard.  [place] or use an object to make noise so someone might hear you.  If you can reach something that you can use for a pillow, place it under your head. Try to stay warm by covering yourself with a blanket or clothing while you wait for help.  When should you call for help?   Call 911 anytime you think you may need emergency care. For example, call if:    You passed out (lost consciousness).     You cannot get up after a fall.     You have severe pain.   Call your doctor now or seek immediate medical care if:    You have new or worse pain.     You are dizzy or lightheaded.     You hit your head.   Watch closely for changes in your health, and be sure to contact your doctor if:    You do not get better as expected.   Where can you learn more?  Go to https://www.[employer].net/patiented  Enter G513 in the search box to learn more about \"How to Get Up Safely After a Fall: Care Instructions.\"  Current as of: November 14, 2022               Content Version: 13.7    6226-3622 [employer].   Care instructions adapted under license by your healthcare professional. If you have questions about a medical condition or this instruction, always ask your healthcare professional. [employer] disclaims any warranty or liability for your use of this information.         Patient Education   Personalized Prevention Plan  You are due for the preventive services outlined below.  Your care team is available to assist you in " scheduling these services.  If you have already completed any of these items, please share that information with your care team to update in your medical record.  Health Maintenance Due   Topic Date Due     Flu Vaccine (1) 09/01/2023     COVID-19 Vaccine (4 - 2023-24 season) 09/01/2023     Colorectal Cancer Screening  09/10/2023     Bladder Training: Care Instructions  Your Care Instructions     Bladder training is used to treat urge incontinence and stress incontinence. Urge incontinence means that the need to urinate comes on so fast that you can't get to a toilet in time. Stress incontinence means that you leak urine because of pressure on your bladder. For example, it may happen when you laugh, cough, or lift something heavy.  Bladder training can increase how long you can wait before you have to urinate. It can also help your bladder hold more urine. And it can give you better control over the urge to urinate.  It is important to remember that bladder training takes a few weeks to a few months to make a difference. You may not see results right away, but don't give up.  Follow-up care is a key part of your treatment and safety. Be sure to make and go to all appointments, and call your doctor if you are having problems. It's also a good idea to know your test results and keep a list of the medicines you take.  How can you care for yourself at home?  Work with your doctor to come up with a bladder training program that is right for you. You may use one or more of the following methods.  Delayed urination  In the beginning, try to keep from urinating for 5 minutes after you first feel the need to go.  While you wait, take deep, slow breaths to relax. Kegel exercises can also help you delay the need to go to the bathroom.  After some practice, when you can easily wait 5 minutes to urinate, try to wait 10 minutes before you urinate.  Slowly increase the waiting period until you are able to control when you have to  "urinate.  Scheduled urination  Empty your bladder when you first wake up in the morning.  Schedule times throughout the day when you will urinate.  Start by going to the bathroom every hour, even if you don't need to go.  Slowly increase the time between trips to the bathroom.  When you have found a schedule that works well for you, keep doing it.  If you wake up during the night and have to urinate, do it. Apply your schedule to waking hours only.  Kegel exercises  These tighten and strengthen pelvic muscles, which can help you control the flow of urine. (If doing these exercises causes pain, stop doing them and talk with your doctor.) To do Kegel exercises:  Squeeze your muscles as if you were trying not to pass gas. Or squeeze your muscles as if you were stopping the flow of urine. Your belly, legs, and buttocks shouldn't move.  Hold the squeeze for 3 seconds, then relax for 5 to 10 seconds.  Start with 3 seconds, then add 1 second each week until you are able to squeeze for 10 seconds.  Repeat the exercise 10 times a session. Do 3 to 8 sessions a day.  When should you call for help?  Watch closely for changes in your health, and be sure to contact your doctor if:    Your incontinence is getting worse.     You do not get better as expected.   Where can you learn more?  Go to https://www.[employer].net/patiented  Enter V684 in the search box to learn more about \"Bladder Training: Care Instructions.\"  Current as of: March 1, 2023               Content Version: 13.7    7602-7126 [employer].   Care instructions adapted under license by your healthcare professional. If you have questions about a medical condition or this instruction, always ask your healthcare professional. [employer] disclaims any warranty or liability for your use of this information.      Preventing Falls: Care Instructions    Talk to your doctor about the medicines you take. Ask if any of them increase the risk of falls " "and whether they can be changed or stopped.   Try to exercise regularly. It can help improve your strength and balance. This can help lower your risk of falling.     Practice fall safety and prevention.    Wear low-heeled shoes that fit well and give your feet good support. Talk to your doctor if you have foot problems that make this hard.  Carry a cellphone or wear a medical alert device that you can use to call for help.  Use stepladders instead of chairs to reach high objects. Don't climb if you're at risk for falls. Ask for help, if needed.  Wear the correct eyeglasses, if you need them.    Make your home safer.    Remove rugs, cords, clutter, and furniture from walkways.  Keep your house well lit. Use night-lights in hallways and bathrooms.  Install and use sturdy handrails on stairways.  Wear nonskid footwear, even inside. Don't walk barefoot or in socks without shoes.    Be safe outside.    Use handrails, curb cuts, and ramps whenever possible.  Keep your hands free by using a shoulder bag or backpack.  Try to walk in well-lit areas. Watch out for uneven ground, changes in pavement, and debris.  Be careful in the winter. Walk on the grass or gravel when sidewalks are slippery. Use de-icer on steps and walkways. Add non-slip devices to shoes.    Put grab bars and nonskid mats in your shower or tub and near the toilet. Try to use a shower chair or bath bench when bathing.   Get into a tub or shower by putting in your weaker leg first. Get out with your strong side first. Have a phone or medical alert device in the bathroom with you.   Where can you learn more?  Go to https://www.[employer].net/patiented  Enter G117 in the search box to learn more about \"Preventing Falls: Care Instructions.\"  Current as of: November 9, 2022               Content Version: 13.7    4013-6151 [employer]   Care instructions adapted under license by your healthcare professional. If you have questions about a medical " condition or this instruction, always ask your healthcare professional. [employer], [facility] disclaims any warranty or liability for your use of this information.      How to Get Up Safely After a Fall: Care Instructions  Overview     If you have injuries, health problems, or other reasons that may make it easy for you to fall at home, it is a good idea to learn how to get up safely after a fall. Learning how to get up correctly can help you avoid making an injury worse.  Also, knowing what to do if you cannot get up can help you stay safe until help arrives.  Follow-up care is a key part of your treatment and safety. Be sure to make and go to all appointments, and call your doctor if you are having problems. It's also a good idea to know your test results and keep a list of the medicines you take.  How can you care for yourself after a fall?  If you think you can get up  First lie still for a few minutes and think about how you feel. If your body feels okay and you think you can get up safely, follow the rest of the steps below:  Look for a chair or other piece of furniture that is close to you.  Roll onto your side and rest. Roll by turning your head in the direction you want to roll, move your shoulder and arm, then hip and leg in the same direction.  Lie still for a moment to let your blood pressure adjust.  Slowly push your upper body up, lift your head, and take a moment to rest.  Slowly get up on your hands and knees, and crawl to the chair or other stable piece of furniture.  Put your hands on the chair.  Move one foot forward, and place it flat on the floor. Your other leg should be bent with the knee on the floor.  Rise slowly, turn your body, and sit in the chair. Stay seated for a bit and think about how you feel. Call for help. Even if you feel okay, let someone know what happened to you. You might not know that you have a serious injury.  If you cannot get up  If you think you are injured after a  "fall or you cannot get up, try not to panic.  Call out for help.  If you have a phone within reach or you have an emergency call device, use it to call for help.  If you do not have a phone within reach, try to slide yourself toward it. If you cannot get to the phone, try to slide toward a door or window or a place where you think you can be heard.  [place] or use an object to make noise so someone might hear you.  If you can reach something that you can use for a pillow, place it under your head. Try to stay warm by covering yourself with a blanket or clothing while you wait for help.  When should you call for help?   Call 911 anytime you think you may need emergency care. For example, call if:    You passed out (lost consciousness).     You cannot get up after a fall.     You have severe pain.   Call your doctor now or seek immediate medical care if:    You have new or worse pain.     You are dizzy or lightheaded.     You hit your head.   Watch closely for changes in your health, and be sure to contact your doctor if:    You do not get better as expected.   Where can you learn more?  Go to https://www.[employer].net/patiented  Enter G513 in the search box to learn more about \"How to Get Up Safely After a Fall: Care Instructions.\"  Current as of: November 14, 2022               Content Version: 13.7    8516-7160 [employer].   Care instructions adapted under license by your healthcare professional. If you have questions about a medical condition or this instruction, always ask your healthcare professional. [employer] disclaims any warranty or liability for your use of this information.         "

## 2023-10-05 ENCOUNTER — DOCUMENTATION ONLY (OUTPATIENT)
Dept: OTHER | Facility: CLINIC | Age: 78
End: 2023-10-05
Payer: MEDICARE

## 2023-10-12 ENCOUNTER — ONCOLOGY VISIT (OUTPATIENT)
Dept: ONCOLOGY | Facility: CLINIC | Age: 78
End: 2023-10-12
Attending: INTERNAL MEDICINE
Payer: MEDICARE

## 2023-10-12 VITALS
SYSTOLIC BLOOD PRESSURE: 180 MMHG | HEIGHT: 63 IN | WEIGHT: 145.6 LBS | TEMPERATURE: 97.7 F | OXYGEN SATURATION: 96 % | BODY MASS INDEX: 25.8 KG/M2 | HEART RATE: 75 BPM | DIASTOLIC BLOOD PRESSURE: 85 MMHG | RESPIRATION RATE: 16 BRPM

## 2023-10-12 DIAGNOSIS — Z85.3 PERSONAL HISTORY OF MALIGNANT NEOPLASM OF BREAST: Primary | ICD-10-CM

## 2023-10-12 DIAGNOSIS — R59.1 LYMPHADENOPATHY: ICD-10-CM

## 2023-10-12 PROCEDURE — 99214 OFFICE O/P EST MOD 30 MIN: CPT | Performed by: INTERNAL MEDICINE

## 2023-10-12 PROCEDURE — G0463 HOSPITAL OUTPT CLINIC VISIT: HCPCS | Performed by: INTERNAL MEDICINE

## 2023-10-12 ASSESSMENT — PAIN SCALES - GENERAL: PAINLEVEL: NO PAIN (0)

## 2023-10-12 NOTE — LETTER
"    10/12/2023         RE: Sharon L Curran Schwab  [address]  [facility] [id number]        Dear Colleague,    Thank you for referring your patient, Sharon L Curran Schwab, to the [facility]. Please see a copy of my visit note below.    Oncology Rooming Note    October 12, 2023 10:36 AM   Sharon L Curran Schwab is a 78 year old female who presents for:    Chief Complaint   Patient presents with     Breast Cancer     Initial Vitals: BP (!) 180/85 (Patient Position: Sitting)   Pulse 75   Temp 97.7  F (36.5  C) (Oral)   Resp 16   Ht 1.61 m (5' 3.39\")   Wt 66 kg (145 lb 9.6 oz)   LMP  (LMP Unknown)   SpO2 96%   BMI 25.48 kg/m   Estimated body mass index is 25.48 kg/m  as calculated from the following:    Height as of this encounter: 1.61 m (5' 3.39\").    Weight as of this encounter: 66 kg (145 lb 9.6 oz). Body surface area is 1.72 meters squared.  No Pain (0) Comment: Data Unavailable   No LMP recorded (lmp unknown). Patient is postmenopausal.  Allergies reviewed: Yes  Medications reviewed: Yes    Medications: Medication refills not needed today.  Pharmacy name entered into AVIS: [id number] [place], [place] - [address]    Clinical concerns: No concerns today.       Margot Rosado LPN              [facility] Hematology and Oncology Progress Note    Patient: Sharon L Curran Schwab  MRN: [id number]  Date of Service: Oct 12, 2023         Reason for Visit    Chief Complaint   Patient presents with     Breast Cancer       Assessment and Plan     Cancer Staging   No matching staging information was found for the patient.      ECOG Performance    0 - Independent     Pain  Pain Score: No Pain (0)    #.  History of right breast DCIS in 1989, hormone receptor positive.  #.  History of left breast invasive lobular carcinoma in 2002, hormone receptor positive.   She is clinically well without any signs of symptoms suggestive of breast cancer recurrence.  She is " feeling very good that she does not have any concerns.  She wanted to continue annual follow-up clinical exam here.  She is feeling comfortable with clinical surveillance only and labs and imaging with symptoms concerning for cancer recurrence.    Follow-up in 1 year for clinical exam for surveillance.  She is advised to call me sooner with any concerns.    #.  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   She has several upper limit of normal size lymphadenopathy above and below the diaphragm incidentally found in 2018.  She is completely asymptomatic.  No clinically palpable lymphadenopathy today.  We will hold off reimaging or biopsy at this point due to lack of symptoms.   Will continue with clinical surveillance.    #.  Hypertension, uncontrolled.   She reported that she started antihypertensive.  Her blood pressure remains elevated in our clinic today.  She will continue to work with her primary care provider regarding hypertension management.    #.  History of mechanical fall x1   She is continuing physical therapy.    Encounter Diagnoses:    Problem List Items Addressed This Visit          Oncology Diagnoses    Personal history of malignant neoplasm of breast - Primary       Other    Lymphadenopathy            CC: Mor Chaidez MD   ______________________________________________________________________________  Diagnosis  1989- diagnosed with DCIS in the right breast by screening mammogram.  2002-left breast invasive lobular carcinoma, early stage.  No lymph node involvement.     Treatment to date  1989-right radical mastectomy and axillary lymph node dissection.  2002-left breast radical mastectomy and axillary lymph node dissection.  No adjuvant chemotherapy or radiation.  Completed 5 years of tamoxifen.  2008-hysterectomy and bilateral oophorectomy.    History of Present Illness    Ms. Sharon L Curran Schwab presented herself today for annual follow-up.  She reported she is doing  "physical therapy once a week for stability and gait after she felt.  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  She does not have headaches, or bone pain.  No other concerns.    Review of systems  Apart from describing in HPI, the remainder of comprehensive ROS was negative.    Past History    Past Medical History:   Diagnosis Date     Breast CA (H)      Cataract of both eyes, unspecified cataract type 5/13/2021     Dysuria 11/20/2017     History of breast cancer 12/9/2016     Perianal candidiasis 6/8/2022     Skin lesion 12/3/2020       Past Surgical History:   Procedure Laterality Date     HYSTERECTOMY       INCONTINENCE SURGERY       MASTECTOMY       MASTECTOMY       REPLACEMENT TOTAL KNEE      left knee         Physical Exam    BP (!) 180/85 (Patient Position: Sitting)   Pulse 75   Temp 97.7  F (36.5  C) (Oral)   Resp 16   Ht 1.61 m (5' 3.39\")   Wt 66 kg (145 lb 9.6 oz)   LMP  (LMP Unknown)   SpO2 96%   BMI 25.48 kg/m      General: alert, awake, not in acute distress  HEENT: Head: Normal, normocephalic, atraumatic.  Eye: Normal external eye, conjunctiva, lids cornea, MYRON.  Pharynx: Normal buccal mucosa. Normal pharynx.  Neck / Thyroid: Supple, no masses, nodes, nodules or enlargement.  Lymphatics: No abnormally enlarged lymph nodes.  Chest: Normal chest wall and respirations. Clear to auscultation.  Breasts: bilateral reconstructed breast.  No palpable abnormalities.  Heart: S1 S2 RRR.   Abdomen: abdomen is soft without significant tenderness, masses, organomegaly or guarding  Extremities: normal strength, tone, and muscle mass  Skin: normal. no rash or abnormalities  CNS: non focal.  Wide-based gait.  She walks without assistance.      Lab Results    No results found for this or any previous visit (from the past 168 hour(s)).    Imaging    No results found.    30 minutes spent on the date of the encounter doing chart review, history and exam, documentation, communication of the treatment plan with the care " team and further activities as noted above.    Signed by: August Arvizu MD      Again, thank you for allowing me to participate in the care of your patient.        Sincerely,        August Arvizu MD

## 2023-10-12 NOTE — PROGRESS NOTES
[facility] Hematology and Oncology Progress Note    Patient: Sharon L Curran Schwab  MRN: [id number]  Date of Service: Oct 12, 2023         Reason for Visit    Chief Complaint   Patient presents with    Breast Cancer       Assessment and Plan     Cancer Staging   No matching staging information was found for the patient.      ECOG Performance    0 - Independent     Pain  Pain Score: No Pain (0)    #.  History of right breast DCIS in 1989, hormone receptor positive.  #.  History of left breast invasive lobular carcinoma in 2002, hormone receptor positive.   She is clinically well without any signs of symptoms suggestive of breast cancer recurrence.  She is feeling very good that she does not have any concerns.  She wanted to continue annual follow-up clinical exam here.  She is feeling comfortable with clinical surveillance only and labs and imaging with symptoms concerning for cancer recurrence.    Follow-up in 1 year for clinical exam for surveillance.  She is advised to call me sooner with any concerns.    #.  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   She has several upper limit of normal size lymphadenopathy above and below the diaphragm incidentally found in 2018.  She is completely asymptomatic.  No clinically palpable lymphadenopathy today.  We will hold off reimaging or biopsy at this point due to lack of symptoms.   Will continue with clinical surveillance.    #.  Hypertension, uncontrolled.   She reported that she started antihypertensive.  Her blood pressure remains elevated in our clinic today.  She will continue to work with her primary care provider regarding hypertension management.    #.  History of mechanical fall x1   She is continuing physical therapy.    Encounter Diagnoses:    Problem List Items Addressed This Visit          Oncology Diagnoses    Personal history of malignant neoplasm of breast - Primary       Other    Lymphadenopathy            CC: Mor Chaidez,  "MD   ______________________________________________________________________________  Diagnosis  1989- diagnosed with DCIS in the right breast by screening mammogram.  2002-left breast invasive lobular carcinoma, early stage.  No lymph node involvement.     Treatment to date  1989-right radical mastectomy and axillary lymph node dissection.  2002-left breast radical mastectomy and axillary lymph node dissection.  No adjuvant chemotherapy or radiation.  Completed 5 years of tamoxifen.  2008-hysterectomy and bilateral oophorectomy.    History of Present Illness    Ms. Sharon L Curran Schwab presented herself today for annual follow-up.  She reported she is doing physical therapy once a week for stability and gait after she felt.  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  She does not have headaches, or bone pain.  No other concerns.    Review of systems  Apart from describing in HPI, the remainder of comprehensive ROS was negative.    Past History    Past Medical History:   Diagnosis Date    Breast CA (H)     Cataract of both eyes, unspecified cataract type 5/13/2021    Dysuria 11/20/2017    History of breast cancer 12/9/2016    Perianal candidiasis 6/8/2022    Skin lesion 12/3/2020       Past Surgical History:   Procedure Laterality Date    HYSTERECTOMY      INCONTINENCE SURGERY      MASTECTOMY      MASTECTOMY      REPLACEMENT TOTAL KNEE      left knee         Physical Exam    BP (!) 180/85 (Patient Position: Sitting)   Pulse 75   Temp 97.7  F (36.5  C) (Oral)   Resp 16   Ht 1.61 m (5' 3.39\")   Wt 66 kg (145 lb 9.6 oz)   LMP  (LMP Unknown)   SpO2 96%   BMI 25.48 kg/m      General: alert, awake, not in acute distress  HEENT: Head: Normal, normocephalic, atraumatic.  Eye: Normal external eye, conjunctiva, lids cornea, MYRON.  Pharynx: Normal buccal mucosa. Normal pharynx.  Neck / Thyroid: Supple, no masses, nodes, nodules or enlargement.  Lymphatics: No abnormally enlarged lymph nodes.  Chest: Normal chest wall and " respirations. Clear to auscultation.  Breasts: bilateral reconstructed breast.  No palpable abnormalities.  Heart: S1 S2 RRR.   Abdomen: abdomen is soft without significant tenderness, masses, organomegaly or guarding  Extremities: normal strength, tone, and muscle mass  Skin: normal. no rash or abnormalities  CNS: non focal.  Wide-based gait.  She walks without assistance.      Lab Results    No results found for this or any previous visit (from the past 168 hour(s)).    Imaging    No results found.    30 minutes spent on the date of the encounter doing chart review, history and exam, documentation, communication of the treatment plan with the care team and further activities as noted above.    Signed by: August Arvizu MD

## 2023-10-12 NOTE — PROGRESS NOTES
"Oncology Rooming Note    October 12, 2023 10:36 AM   Sharon L Curran Schwab is a 78 year old female who presents for:    Chief Complaint   Patient presents with    Breast Cancer     Initial Vitals: BP (!) 180/85 (Patient Position: Sitting)   Pulse 75   Temp 97.7  F (36.5  C) (Oral)   Resp 16   Ht 1.61 m (5' 3.39\")   Wt 66 kg (145 lb 9.6 oz)   LMP  (LMP Unknown)   SpO2 96%   BMI 25.48 kg/m   Estimated body mass index is 25.48 kg/m  as calculated from the following:    Height as of this encounter: 1.61 m (5' 3.39\").    Weight as of this encounter: 66 kg (145 lb 9.6 oz). Body surface area is 1.72 meters squared.  No Pain (0) Comment: Data Unavailable   No LMP recorded (lmp unknown). Patient is postmenopausal.  Allergies reviewed: Yes  Medications reviewed: Yes    Medications: Medication refills not needed today.  Pharmacy name entered into Virtual Paper: [id number] [address]    Clinical concerns: No concerns today.       Margot Rosado LPN            "

## 2023-10-19 ENCOUNTER — TELEPHONE (OUTPATIENT)
Dept: FAMILY MEDICINE | Facility: CLINIC | Age: 78
End: 2023-10-19
Payer: MEDICARE

## 2023-10-19 NOTE — TELEPHONE ENCOUNTER
Patient Quality Outreach    Patient is due for the following:   Hypertension -  BP check    Next Steps:   Schedule a nurse only visit for Bp check    Type of outreach:    Phone, left message for patient/parent to call back.      Questions for provider review:    None           Michlee Sin MA  Chart routed to Care Team.

## 2023-10-20 ENCOUNTER — ALLIED HEALTH/NURSE VISIT (OUTPATIENT)
Dept: FAMILY MEDICINE | Facility: CLINIC | Age: 78
End: 2023-10-20
Payer: COMMERCIAL

## 2023-10-20 VITALS — DIASTOLIC BLOOD PRESSURE: 72 MMHG | SYSTOLIC BLOOD PRESSURE: 163 MMHG

## 2023-10-20 DIAGNOSIS — I10 BENIGN ESSENTIAL HYPERTENSION: Primary | ICD-10-CM

## 2023-10-20 PROCEDURE — 99207 PR NO CHARGE NURSE ONLY: CPT

## 2023-10-20 NOTE — PROGRESS NOTES
I met with Sharon L Curran Schwab at the request of Dr. Hemphill  to recheck her blood pressure.  Blood pressure medications on the med list were reviewed with patient.    Patient has taken all medications as per usual regimen: Yes  Patient reports tolerating them without any issues or concerns: Yes    Vitals:    10/20/23 0753   BP: (!) 163/75   BP Location: Left arm   Patient Position: Sitting   Cuff Size: Adult Regular       After 5 minutes, the patient's blood pressure remained greater than or equal to 140/90.    Is the patient currently having any chest pain? No  Does the patient currently have a headache? No  Does the patient currently have any vision changes? No  Does the patient currently have any nausea? No  Does the patient currently have any abdominal pain? No    The previous encounter was reviewed.  The patient was discharged and the note will be sent to the provider for final review.  Patient will record BP at home and call weekly with numbers, she states numbers are lower at home

## 2023-10-27 ENCOUNTER — OFFICE VISIT (OUTPATIENT)
Dept: FAMILY MEDICINE | Facility: CLINIC | Age: 78
End: 2023-10-27
Payer: MEDICARE

## 2023-10-27 VITALS
BODY MASS INDEX: 25.78 KG/M2 | HEART RATE: 69 BPM | WEIGHT: 145.5 LBS | OXYGEN SATURATION: 98 % | SYSTOLIC BLOOD PRESSURE: 133 MMHG | TEMPERATURE: 97.8 F | DIASTOLIC BLOOD PRESSURE: 71 MMHG | RESPIRATION RATE: 16 BRPM | HEIGHT: 63 IN

## 2023-10-27 DIAGNOSIS — I10 BENIGN ESSENTIAL HYPERTENSION: Primary | ICD-10-CM

## 2023-10-27 PROCEDURE — 99213 OFFICE O/P EST LOW 20 MIN: CPT | Performed by: FAMILY MEDICINE

## 2023-10-27 RX ORDER — RESPIRATORY SYNCYTIAL VIRUS VACCINE 120MCG/0.5
0.5 KIT INTRAMUSCULAR ONCE
Qty: 1 EACH | Refills: 0 | Status: CANCELLED | OUTPATIENT
Start: 2023-10-27 | End: 2023-10-27

## 2023-10-27 NOTE — ASSESSMENT & PLAN NOTE
BP improved.  No side effects.  Goal for her given stability concerns is SBP <150s.   
Pt. is a 45 y.o. F w/ PMHx of HTN, pre-DM, implanted loop recorder w/ SVT, FHx of MIs p/w sharp, reproducible, pleuritic, non-exertional CP since a few hours ago.  Pt. was cooking dinner w/ her boyfriend when sxs started.  Pt. denies hx of MI, CVA, cardiac stents, stress tests.  Pain is a/w nausea and SOB.  Pt. states she also slight numbness in her left arm since yesterday.

## 2023-10-27 NOTE — PROGRESS NOTES
"  Assessment & Plan   Problem List Items Addressed This Visit       Benign essential hypertension - Primary     BP improved.  No side effects.  Goal for her given stability concerns is SBP <150s.           Mor Chaidez MD  [facility] GEOVANI Velasquez is a 78 year old, presenting for the following health issues:  Hypertension (Follow-up/)        10/27/2023     9:57 AM   Additional Questions   Roomed by xl       Feels well.  No lightheadedness.  No dizziness.  She been checking her blood pressure on a regular basis and majority of her measurements have been in the 120s.  She believes the elevated blood pressure was as a result of her walking in the clinic and immediately having a blood pressure check.  In general, she finds that she needs to sit and rest for 5 to 10 minutes before she can generated accurate blood pressure measurement.    History of Present Illness       Hypertension: She presents for follow up of hypertension.  She does check blood pressure  regularly outside of the clinic. Outside blood pressures have been over 140/90. She follows a low salt diet.     She eats 2-3 servings of fruits and vegetables daily.She consumes 0 sweetened beverage(s) daily.She exercises with enough effort to increase her heart rate 20 to 29 minutes per day.  She exercises with enough effort to increase her heart rate 7 days per week.   She is taking medications regularly.    Review of Systems         Objective    /71 (BP Location: Left arm, Patient Position: Sitting, Cuff Size: Adult Regular)   Pulse 69   Temp 97.8  F (36.6  C) (Oral)   Resp 16   Ht 1.61 m (5' 3.39\")   Wt 66 kg (145 lb 8 oz)   LMP  (LMP Unknown)   SpO2 98%   BMI 25.46 kg/m    Body mass index is 25.46 kg/m .  Physical Exam  Nursing note reviewed.   Constitutional:       General: She is not in acute distress.     Appearance: Normal appearance. She is not ill-appearing.   HENT:      Head: Normocephalic and " atraumatic.   Eyes:      Extraocular Movements: Extraocular movements intact.      Conjunctiva/sclera: Conjunctivae normal.   Pulmonary:      Effort: Pulmonary effort is normal.   Neurological:      Mental Status: She is alert and oriented to person, place, and time.   Psychiatric:         Attention and Perception: Attention normal.         Mood and Affect: Mood normal.         Speech: Speech normal.         Thought Content: Thought content normal.

## 2024-04-22 ENCOUNTER — OFFICE VISIT (OUTPATIENT)
Dept: FAMILY MEDICINE | Facility: CLINIC | Age: 79
End: 2024-04-22
Payer: MEDICARE

## 2024-04-22 VITALS
RESPIRATION RATE: 16 BRPM | BODY MASS INDEX: 24.81 KG/M2 | HEART RATE: 74 BPM | DIASTOLIC BLOOD PRESSURE: 74 MMHG | SYSTOLIC BLOOD PRESSURE: 179 MMHG | OXYGEN SATURATION: 98 % | TEMPERATURE: 97.9 F | HEIGHT: 64 IN | WEIGHT: 145.3 LBS

## 2024-04-22 DIAGNOSIS — I10 BENIGN ESSENTIAL HYPERTENSION: ICD-10-CM

## 2024-04-22 DIAGNOSIS — R07.89 ATYPICAL CHEST PAIN: Primary | ICD-10-CM

## 2024-04-22 LAB
ATRIAL RATE - MUSE: 67 BPM
DIASTOLIC BLOOD PRESSURE - MUSE: NORMAL MMHG
INTERPRETATION ECG - MUSE: NORMAL
P AXIS - MUSE: 62 DEGREES
PR INTERVAL - MUSE: 170 MS
QRS DURATION - MUSE: 70 MS
QT - MUSE: 382 MS
QTC - MUSE: 403 MS
R AXIS - MUSE: 25 DEGREES
SYSTOLIC BLOOD PRESSURE - MUSE: NORMAL MMHG
T AXIS - MUSE: 61 DEGREES
VENTRICULAR RATE- MUSE: 67 BPM

## 2024-04-22 PROCEDURE — 99214 OFFICE O/P EST MOD 30 MIN: CPT | Mod: 25 | Performed by: FAMILY MEDICINE

## 2024-04-22 PROCEDURE — 93010 ELECTROCARDIOGRAM REPORT: CPT | Mod: OFF | Performed by: INTERNAL MEDICINE

## 2024-04-22 PROCEDURE — 93005 ELECTROCARDIOGRAM TRACING: CPT | Performed by: FAMILY MEDICINE

## 2024-04-22 RX ORDER — RESPIRATORY SYNCYTIAL VIRUS VACCINE 120MCG/0.5
0.5 KIT INTRAMUSCULAR ONCE
Qty: 1 EACH | Refills: 0 | Status: CANCELLED | OUTPATIENT
Start: 2024-04-22 | End: 2024-04-22

## 2024-04-22 NOTE — ASSESSMENT & PLAN NOTE
Blood pressure elevated.  She checks her blood pressure on a regular basis and it has been within normal limits every day within the past week with the exception of today.  She will continue to monitor.  She does have a history of whitecoat hypertension and is often outside the normal range when in clinic.  No changes to medications today but consider for future if it remains elevated.

## 2024-04-22 NOTE — ASSESSMENT & PLAN NOTE
Presents with symptoms of paresthesia left shoulder and neck.  We discussed that this might represent atypical chest pain.  Risk factors for heart disease include age and hypertension.  EKG today without changes in comparison to study from 2018.  Exercise capacity has not changed since onset of the symptoms.  For now, no other evaluation recommended but if she has worsening symptoms we should consider additional workup.  Patient was in agreement.  We considered laboratory testing but this was not completed as the patient had to urgently leave clinic for another responsibility.

## 2024-04-22 NOTE — PROGRESS NOTES
Assessment & Plan   Problem List Items Addressed This Visit       Atypical chest pain - Primary     Presents with symptoms of paresthesia left shoulder and neck.  We discussed that this might represent atypical chest pain.  Risk factors for heart disease include age and hypertension.  EKG today without changes in comparison to study from 2018.  Exercise capacity has not changed since onset of the symptoms.  For now, no other evaluation recommended but if she has worsening symptoms we should consider additional workup.  Patient was in agreement.  We considered laboratory testing but this was not completed as the patient had to urgently leave clinic for another responsibility.         Relevant Orders    EKG 12-lead, tracing only (Completed)    Benign essential hypertension     Blood pressure elevated.  She checks her blood pressure on a regular basis and it has been within normal limits every day within the past week with the exception of today.  She will continue to monitor.  She does have a history of whitecoat hypertension and is often outside the normal range when in clinic.  No changes to medications today but consider for future if it remains elevated.         Relevant Orders    EKG 12-lead, tracing only (Completed)         Subjective   Eva is a 78 year old, presenting for the following health issues:  Hypertension (Follow-up/)        4/22/2024     3:00 PM   Additional Questions   Roomed by xl     Left arm tingling.  Left side and up to neck. Non-exertional including today.  This has been present for about a month.  She had it during PT last week.    BP at home has been 120s - 130s.  No lightheadedness.  No dizziness.   - no associated SOB. [place]amina [place] her [place].  Continues to do her balance and cardio exercises.      History of Present Illness       Hypertension: She presents for follow up of hypertension.  She does check blood pressure  regularly outside of the clinic. Outside blood pressures  "have been over 140/90. She follows a low salt diet.     She eats 2-3 servings of fruits and vegetables daily.She consumes 0 sweetened beverage(s) daily.She exercises with enough effort to increase her heart rate 20 to 29 minutes per day.  She exercises with enough effort to increase her heart rate 7 days per week.   She is taking medications regularly.         Objective    BP (!) 179/74 (BP Location: Left arm, Patient Position: Sitting, Cuff Size: Adult Regular)   Pulse 74   Temp 97.9  F (36.6  C) (Oral)   Resp 16   Ht 1.613 m (5' 3.5\")   Wt 65.9 kg (145 lb 4.8 oz)   LMP  (LMP Unknown)   SpO2 98%   BMI 25.34 kg/m    Body mass index is 25.34 kg/m .  Physical Exam  Nursing note reviewed.   Constitutional:       General: She is not in acute distress.     Appearance: Normal appearance. She is not ill-appearing.   HENT:      Head: Normocephalic and atraumatic.      Right Ear: External ear normal.      Left Ear: External ear normal.   Eyes:      General: No scleral icterus.     Extraocular Movements: Extraocular movements intact.      Conjunctiva/sclera: Conjunctivae normal.   Cardiovascular:      Rate and Rhythm: Normal rate and regular rhythm.      Heart sounds: Normal heart sounds. No murmur heard.     No friction rub. No gallop.   Pulmonary:      Effort: Pulmonary effort is normal. No respiratory distress.      Breath sounds: Normal breath sounds. No wheezing or rales.   Musculoskeletal:         General: No swelling. Normal range of motion.   Skin:     General: Skin is warm.      Coloration: Skin is not jaundiced.      Findings: No rash.   Neurological:      General: No focal deficit present.      Mental Status: She is alert and oriented to person, place, and time. Mental status is at baseline.   Psychiatric:         Attention and Perception: Attention normal.         Mood and Affect: Mood normal.         Speech: Speech normal.         Thought Content: Thought content normal.        EKG: My " interpretation-normal sinus rhythm.            Signed Electronically by: Mor Chaidez MD

## 2024-04-24 ENCOUNTER — PATIENT OUTREACH (OUTPATIENT)
Dept: ONCOLOGY | Facility: HOSPITAL | Age: 79
End: 2024-04-24
Payer: MEDICARE

## 2024-04-24 NOTE — PROGRESS NOTES
"  2:23 pm Eva returned call to clinic. Per Eva, she is having new left breast sensation she would like to be evaluated. Eva states sensation feels like tingling where left breast used to be. Denies pain or shortness of breath. States that she was seen by PCP about this issue, per Eva, PCP did EKG and EKG \"was good\" and PCP did not think she needed additional follow-up. Eva states she would like to be evaluated in case sensation is related to hx of breast cancer. Reviewed warning signs related abnormal chest sensations and when to seek emergency care. Eva verbalizes understanding. Eva encouraged to return call to clinic or seek medical care with changing or concerning symptoms.     Chitra Reyes RN...............................04/24/24  2:30 PM       11:27 am Received call from Eva on triage line. Eva reports concern with breast. Return call placed to Eva to further assess symptoms. Unable to contact, left voicemail for Eva to return call to clinic, contact information provided.     Chitra Reyes RN...............................11:29 AM 4/24/24  "

## 2024-04-26 ENCOUNTER — ONCOLOGY VISIT (OUTPATIENT)
Dept: ONCOLOGY | Facility: HOSPITAL | Age: 79
End: 2024-04-26
Attending: INTERNAL MEDICINE
Payer: MEDICARE

## 2024-04-26 VITALS
WEIGHT: 146 LBS | BODY MASS INDEX: 24.92 KG/M2 | TEMPERATURE: 97.9 F | HEART RATE: 66 BPM | DIASTOLIC BLOOD PRESSURE: 89 MMHG | RESPIRATION RATE: 16 BRPM | OXYGEN SATURATION: 95 % | SYSTOLIC BLOOD PRESSURE: 197 MMHG | HEIGHT: 64 IN

## 2024-04-26 DIAGNOSIS — R59.1 LYMPHADENOPATHY: ICD-10-CM

## 2024-04-26 DIAGNOSIS — Z85.3 PERSONAL HISTORY OF MALIGNANT NEOPLASM OF BREAST: Primary | ICD-10-CM

## 2024-04-26 DIAGNOSIS — R20.2 ARM PARESTHESIA, LEFT: ICD-10-CM

## 2024-04-26 PROCEDURE — G2211 COMPLEX E/M VISIT ADD ON: HCPCS | Performed by: NURSE PRACTITIONER

## 2024-04-26 PROCEDURE — 99214 OFFICE O/P EST MOD 30 MIN: CPT | Performed by: NURSE PRACTITIONER

## 2024-04-26 PROCEDURE — G0463 HOSPITAL OUTPT CLINIC VISIT: HCPCS | Performed by: NURSE PRACTITIONER

## 2024-04-26 ASSESSMENT — PAIN SCALES - GENERAL: PAINLEVEL: NO PAIN (0)

## 2024-04-26 NOTE — PROGRESS NOTES
"Oncology Rooming Note    April 26, 2024 2:55 PM   Sharon L Curran Schwab is a 78 year old female who presents for:    Chief Complaint   Patient presents with    Oncology Clinic Visit     Personal history of malignant neoplasm of breast     Initial Vitals: BP (!) 197/89 (BP Location: Left arm, Patient Position: Sitting, Cuff Size: Adult Regular)   Pulse 66   Temp 97.9  F (36.6  C) (Oral)   Resp 16   Ht 1.613 m (5' 3.5\")   Wt 66.2 kg (146 lb)   LMP  (LMP Unknown)   SpO2 95%   BMI 25.46 kg/m   Estimated body mass index is 25.46 kg/m  as calculated from the following:    Height as of this encounter: 1.613 m (5' 3.5\").    Weight as of this encounter: 66.2 kg (146 lb). Body surface area is 1.72 meters squared.  No Pain (0) Comment: Data Unavailable   No LMP recorded (lmp unknown). Patient is postmenopausal.  Allergies reviewed: Yes  Medications reviewed: Yes    Medications: Medication refills not needed today.  Pharmacy name entered into [employer]: [id number] [address] [facility] [address] [facility]    Frailty Screening:   Is the patient here for a new oncology consult visit in cancer care? 2. No      Clinical concerns: Follow up on tingling on left side   Camryn Cooper NP  was notified.      Christine Farrell LPN              "

## 2024-04-26 NOTE — LETTER
4/26/2024         RE: Sharon L Curran Schwab  [address]  [facility] [address]        Dear Colleague,    Thank you for referring your patient, Sharon L Curran Schwab, to the [facility]. Please see a copy of my visit note below.    [facility] Hematology and Oncology Progress Note    Patient: Sharon L Curran Schwab  MRN: [id number]  Date of Service: Apr 26, 2024         Reason for Visit    Left chest wall discomfort/tingling  Remote hx of R breast DCIS and L breast invasive cancer    Primary Oncologist: Dr. Arvizu    Assessment and Plan    #.  L chest wall/axillary and L arm parasthesia  Negative exam. No signs of shingles, masses, nodes on exam.     Recent EKG was normal. No cardiac symptoms.     Differentials are pre-shingles neuropathic discomfort, benign MSK etiology along chest wall/neuropathic pain related to thoracic spine vs latent recurrent breast cancer or change in her underlying low grade lymphoma.     Plan:  -Chest CT to rule out breast recurrence or progressive lymphadenopathy   -If Chest CT negative, but symptoms persist would suggest follow-up with PCP or Neurologist for further imaging(?MRI)/exam    #.  History of right breast DCIS in 1989, hormone receptor positive.  #.  History of left breast invasive lobular carcinoma in 2002, hormone receptor positive.  She is 20+ yrs out, so low risk for recurrence but not 0% in hormone+ breast cancer.   She prefers continued annual follow-up in Oncology and will return in October.   Post bilateral mastectomies, so not needing mammograms.    #.  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  She has several upper limit of normal size lymphadenopathy above and below the diaphragm incidentally found in 2018.  No clinically palpable lymphadenopathy today. No B symptoms.     Plan:  -Unlikely contributing to her left chest wall/arm tingling, but will get CT chest to rule  out    ______________________________________________________________________________  Diagnosis  1989- diagnosed with DCIS in the right breast by screening mammogram.  2002-left breast invasive lobular carcinoma, early stage.  No lymph node involvement.     Treatment to date  1989-right radical mastectomy and axillary lymph node dissection.  2002-left breast radical mastectomy and axillary lymph node dissection.  No adjuvant chemotherapy or radiation.  Completed 5 years of tamoxifen.  2008-hysterectomy and bilateral oophorectomy.    History of Present Illness    Eva has a remote hx of R DCIS and stage I ER/[place]+ L invasive lobular breast cancer, last diagnosis 2002 (22 yrs ago). She's had bilateral mastectomies and node dissections. Completed 5 yrs tamoxifen.     She usually comes annually, but came earlier with tingling sensation over left  neck/proximal arm/axilla and chest wall that is new. Intermittent and fleeting x 3 weeks. No HA, slurred speech, extremity weakness, rash, chest pain, swelling, masses/nodes, dyspnea, back pain. Her PCP did EKG that was unremarkable.     No night sweats nor weight loss.  She reported she is doing physical therapy once a week for stability and gait after she felt.  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  She does not have headaches, or bone pain.  No other concerns.    ECOG Performance  0 - Independent      Physical Exam    LMP  (LMP Unknown)     General: alert, awake, not in acute distress  HEENT: Head: Normal, normocephalic, atraumatic.  Neck / Thyroid: Supple, no masses, nodes, nodules or enlargement.  Lymphatics: No abnormally enlarged cervical nor axillary lymph nodes.  Chest: Normal chest wall and respirations. Clear to auscultation. No chest wall pain.  Breasts: bilateral reconstructed breast.  No palpable abnormalities.  Heart: RRR.   Abdomen: abdomen is soft without significant tenderness, masses, organomegaly or guarding  Extremities: normal strength, tone, and muscle  "mass  Skin: normal. no rash or abnormalities. Specifically, no rash along left neck/arm/chest wall  CNS: non focal.  Wide-based gait.  She walks without assistance.      Lab Results    Recent Results (from the past 168 hour(s))   EKG 12-lead, tracing only   Result Value Ref Range    Systolic Blood Pressure  mmHg    Diastolic Blood Pressure  mmHg    Ventricular Rate 67 BPM    Atrial Rate 67 BPM    KS Interval 170 ms    QRS Duration 70 ms     ms    QTc 403 ms    P Axis 62 degrees    R AXIS 25 degrees    T Axis 61 degrees    Interpretation ECG       Sinus rhythm  Normal ECG  When compared with ECG of 15-MEE-2018 11:29,  No significant change was found  Confirmed by JESS JAIMES MD LOC:JN ([id number]) on 4/22/2024 3:44:17 PM         Imaging    No results found.    Total time 30 minutes, to include face to face visit, review of EMR, ordering, documentation and coordination of care on date of service.    complexity modifier for longitudinal care.       Signed by: Camryn Cooper NP    Oncology Rooming Note    April 26, 2024 2:55 PM   Sharon L Curran Schwab is a 78 year old female who presents for:    Chief Complaint   Patient presents with     Oncology Clinic Visit     Personal history of malignant neoplasm of breast     Initial Vitals: BP (!) 197/89 (BP Location: Left arm, Patient Position: Sitting, Cuff Size: Adult Regular)   Pulse 66   Temp 97.9  F (36.6  C) (Oral)   Resp 16   Ht 1.613 m (5' 3.5\")   Wt 66.2 kg (146 lb)   LMP  (LMP Unknown)   SpO2 95%   BMI 25.46 kg/m   Estimated body mass index is 25.46 kg/m  as calculated from the following:    Height as of this encounter: 1.613 m (5' 3.5\").    Weight as of this encounter: 66.2 kg (146 lb). Body surface area is 1.72 meters squared.  No Pain (0) Comment: Data Unavailable   No LMP recorded (lmp unknown). Patient is postmenopausal.  Allergies reviewed: Yes  Medications reviewed: Yes    Medications: Medication refills not needed today.  Pharmacy name entered " into [facility]: [id number] [place], [place] - [address]    Frailty Screening:   Is the patient here for a new oncology consult visit in cancer care? 2. No      Clinical concerns: Follow up on tingling on left side   Camryn Cooper NP  was notified.      Christine Farrell LPN                Again, thank you for allowing me to participate in the care of your patient.        Sincerely,        Camryn oCoper NP

## 2024-04-26 NOTE — PROGRESS NOTES
[facility] Hematology and Oncology Progress Note    Patient: Sharon L Curran Schwab  MRN: [id number]  Date of Service: Apr 26, 2024         Reason for Visit    Left chest wall discomfort/tingling  Remote hx of R breast DCIS and L breast invasive cancer    Primary Oncologist: Dr. Arvizu    Assessment and Plan    #.  L chest wall/axillary and L arm parasthesia  Negative exam. No signs of shingles, masses, nodes on exam.     Recent EKG was normal. No cardiac symptoms.     Differentials are pre-shingles neuropathic discomfort, benign MSK etiology along chest wall/neuropathic pain related to thoracic spine vs latent recurrent breast cancer or change in her underlying low grade lymphoma.     Plan:  -Chest CT to rule out breast recurrence or progressive lymphadenopathy   -If Chest CT negative, but symptoms persist would suggest follow-up with PCP or Neurologist for further imaging(?MRI)/exam    #.  History of right breast DCIS in 1989, hormone receptor positive.  #.  History of left breast invasive lobular carcinoma in 2002, hormone receptor positive.  She is 20+ yrs out, so low risk for recurrence but not 0% in hormone+ breast cancer.   She prefers continued annual follow-up in Oncology and will return in October.   Post bilateral mastectomies, so not needing mammograms.    #.  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  She has several upper limit of normal size lymphadenopathy above and below the diaphragm incidentally found in 2018.  No clinically palpable lymphadenopathy today. No B symptoms.     Plan:  -Unlikely contributing to her left chest wall/arm tingling, but will get CT chest to rule out    ______________________________________________________________________________  Diagnosis  1989- diagnosed with DCIS in the right breast by screening mammogram.  2002-left breast invasive lobular carcinoma, early stage.  No lymph node involvement.     Treatment to date  1989-right radical mastectomy  and axillary lymph node dissection.  2002-left breast radical mastectomy and axillary lymph node dissection.  No adjuvant chemotherapy or radiation.  Completed 5 years of tamoxifen.  2008-hysterectomy and bilateral oophorectomy.    History of Present Illness    Eva has a remote hx of R DCIS and stage I ER/[place]+ L invasive lobular breast cancer, last diagnosis 2002 (22 yrs ago). She's had bilateral mastectomies and node dissections. Completed 5 yrs tamoxifen.     She usually comes annually, but came earlier with tingling sensation over left  neck/proximal arm/axilla and chest wall that is new. Intermittent and fleeting x 3 weeks. No HA, slurred speech, extremity weakness, rash, chest pain, swelling, masses/nodes, dyspnea, back pain. Her PCP did EKG that was unremarkable.     No night sweats nor weight loss.  She reported she is doing physical therapy once a week for stability and gait after she felt.  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  She does not have headaches, or bone pain.  No other concerns.    ECOG Performance  0 - Independent      Physical Exam    LMP  (LMP Unknown)     General: alert, awake, not in acute distress  HEENT: Head: Normal, normocephalic, atraumatic.  Neck / Thyroid: Supple, no masses, nodes, nodules or enlargement.  Lymphatics: No abnormally enlarged cervical nor axillary lymph nodes.  Chest: Normal chest wall and respirations. Clear to auscultation. No chest wall pain.  Breasts: bilateral reconstructed breast.  No palpable abnormalities.  Heart: RRR.   Abdomen: abdomen is soft without significant tenderness, masses, organomegaly or guarding  Extremities: normal strength, tone, and muscle mass  Skin: normal. no rash or abnormalities. Specifically, no rash along left neck/arm/chest wall  CNS: non focal.  Wide-based gait.  She walks without assistance.      Lab Results    Recent Results (from the past 168 hour(s))   EKG 12-lead, tracing only   Result Value Ref Range    Systolic Blood Pressure   mmHg    Diastolic Blood Pressure  mmHg    Ventricular Rate 67 BPM    Atrial Rate 67 BPM    NM Interval 170 ms    QRS Duration 70 ms     ms    QTc 403 ms    P Axis 62 degrees    R AXIS 25 degrees    T Axis 61 degrees    Interpretation ECG       Sinus rhythm  Normal ECG  When compared with ECG of 15-MEE-2018 11:29,  No significant change was found  Confirmed by THEODORE NDIAYE, JESS LOC:JN ([id number]) on 4/22/2024 3:44:17 PM         Imaging    No results found.    Total time 30 minutes, to include face to face visit, review of EMR, ordering, documentation and coordination of care on date of service.    complexity modifier for longitudinal care.       Signed by: Camryn Cooper NP

## 2024-05-06 ENCOUNTER — HOSPITAL ENCOUNTER (OUTPATIENT)
Dept: CT IMAGING | Facility: HOSPITAL | Age: 79
Discharge: HOME | End: 2024-05-06
Attending: NURSE PRACTITIONER | Admitting: NURSE PRACTITIONER
Payer: MEDICARE

## 2024-05-06 ENCOUNTER — PATIENT OUTREACH (OUTPATIENT)
Dept: ONCOLOGY | Facility: HOSPITAL | Age: 79
End: 2024-05-06

## 2024-05-06 DIAGNOSIS — Z85.3 PERSONAL HISTORY OF MALIGNANT NEOPLASM OF BREAST: ICD-10-CM

## 2024-05-06 DIAGNOSIS — R59.1 LYMPHADENOPATHY: ICD-10-CM

## 2024-05-06 LAB
CREAT BLD-MCNC: 0.5 MG/DL (ref 0.6–1.1)
EGFRCR SERPLBLD CKD-EPI 2021: >60 ML/MIN/1.73M2

## 2024-05-06 PROCEDURE — 82565 ASSAY OF CREATININE: CPT

## 2024-05-06 PROCEDURE — 250N000011 HC RX IP 250 OP 636: Performed by: NURSE PRACTITIONER

## 2024-05-06 PROCEDURE — 71260 CT THORAX DX C+: CPT | Mod: MG

## 2024-05-06 PROCEDURE — 250N000009 HC RX 250: Performed by: NURSE PRACTITIONER

## 2024-05-06 RX ORDER — IOPAMIDOL 755 MG/ML
90 INJECTION, SOLUTION INTRAVASCULAR ONCE
Status: COMPLETED | OUTPATIENT
Start: 2024-05-06 | End: 2024-05-06

## 2024-05-06 RX ADMIN — IOPAMIDOL 90 ML: 755 INJECTION, SOLUTION INTRAVENOUS at 11:51

## 2024-05-06 RX ADMIN — SODIUM CHLORIDE 90 ML: 9 INJECTION, SOLUTION INTRAVENOUS at 11:54

## 2024-05-06 NOTE — PROGRESS NOTES
"Call to Eva to review CT scan. Per Camryn Cooper CNP,     \"Chest CT negative for breast cancer recurrence or lymphoma. No findings to correlate.     If her symptoms persist, she should follow-up with PCP. May need Neurology assessment. \"    Eva expressed appreciation for the information and is agreeable to this plan. Encouraged Eva to return call to clinic with any further questions or concerns.     Chitra Reyes RN...............................05/06/24  2:30 PM     "

## 2024-07-17 ENCOUNTER — TELEPHONE (OUTPATIENT)
Dept: FAMILY MEDICINE | Facility: CLINIC | Age: 79
End: 2024-07-17

## 2024-07-17 NOTE — TELEPHONE ENCOUNTER
Patient Quality Outreach    Patient is due for the following:   Hypertension -  BP check      Next Steps:   Schedule a nurse only visit for BP    Type of outreach:    Phone, left message for patient/parent to call back.      Questions for provider review:    None           Michele Sin MA  Chart routed to Care Team.

## 2024-08-19 NOTE — TELEPHONE ENCOUNTER
Patient Quality Outreach    Patient is due for the following:   Hypertension -  BP check    Next Steps:   Patient has upcoming appointment, these items will be addressed at that time.  Next appt x 10/4/24  Type of outreach:    Chart review performed, no outreach needed.      Questions for provider review:    None           Michele Sin MA

## 2024-09-14 DIAGNOSIS — H65.92 FLUID LEVEL BEHIND TYMPANIC MEMBRANE OF LEFT EAR: ICD-10-CM

## 2024-09-16 RX ORDER — FLUTICASONE PROPIONATE 50 MCG
SPRAY, SUSPENSION (ML) NASAL
Qty: 16 G | Refills: 11 | Status: SHIPPED | OUTPATIENT
Start: 2024-09-16

## 2024-10-04 ENCOUNTER — OFFICE VISIT (OUTPATIENT)
Dept: FAMILY MEDICINE | Facility: CLINIC | Age: 79
End: 2024-10-04
Payer: MEDICARE

## 2024-10-04 VITALS
OXYGEN SATURATION: 99 % | BODY MASS INDEX: 24.24 KG/M2 | TEMPERATURE: 97.9 F | HEIGHT: 64 IN | WEIGHT: 142 LBS | DIASTOLIC BLOOD PRESSURE: 80 MMHG | RESPIRATION RATE: 16 BRPM | SYSTOLIC BLOOD PRESSURE: 134 MMHG | HEART RATE: 76 BPM

## 2024-10-04 DIAGNOSIS — I10 BENIGN ESSENTIAL HYPERTENSION: ICD-10-CM

## 2024-10-04 DIAGNOSIS — Z87.898 HISTORY OF URINARY INCONTINENCE: ICD-10-CM

## 2024-10-04 DIAGNOSIS — Z00.00 ENCOUNTER FOR MEDICARE ANNUAL WELLNESS EXAM: Primary | ICD-10-CM

## 2024-10-04 DIAGNOSIS — Z13.0 SCREENING FOR DEFICIENCY ANEMIA: ICD-10-CM

## 2024-10-04 DIAGNOSIS — H61.23 BILATERAL IMPACTED CERUMEN: ICD-10-CM

## 2024-10-04 DIAGNOSIS — E78.00 PURE HYPERCHOLESTEROLEMIA: ICD-10-CM

## 2024-10-04 DIAGNOSIS — R07.89 ATYPICAL CHEST PAIN: ICD-10-CM

## 2024-10-04 LAB — HGB BLD-MCNC: 12 G/DL (ref 11.7–15.7)

## 2024-10-04 PROCEDURE — 80048 BASIC METABOLIC PNL TOTAL CA: CPT | Performed by: FAMILY MEDICINE

## 2024-10-04 PROCEDURE — G0439 PPPS, SUBSEQ VISIT: HCPCS | Performed by: FAMILY MEDICINE

## 2024-10-04 PROCEDURE — 90662 IIV NO PRSV INCREASED AG IM: CPT | Performed by: FAMILY MEDICINE

## 2024-10-04 PROCEDURE — 36415 COLL VENOUS BLD VENIPUNCTURE: CPT | Performed by: FAMILY MEDICINE

## 2024-10-04 PROCEDURE — 85018 HEMOGLOBIN: CPT | Performed by: FAMILY MEDICINE

## 2024-10-04 PROCEDURE — 99213 OFFICE O/P EST LOW 20 MIN: CPT | Mod: 25 | Performed by: FAMILY MEDICINE

## 2024-10-04 PROCEDURE — 90471 IMMUNIZATION ADMIN: CPT | Performed by: FAMILY MEDICINE

## 2024-10-04 RX ORDER — LISINOPRIL 20 MG/1
20 TABLET ORAL DAILY
Qty: 90 TABLET | Refills: 3 | Status: SHIPPED | OUTPATIENT
Start: 2024-10-04

## 2024-10-04 SDOH — HEALTH STABILITY: PHYSICAL HEALTH: ON AVERAGE, HOW MANY DAYS PER WEEK DO YOU ENGAGE IN MODERATE TO STRENUOUS EXERCISE (LIKE A BRISK WALK)?: 7 DAYS

## 2024-10-04 ASSESSMENT — ENCOUNTER SYMPTOMS
BACK PAIN: 0
DYSURIA: 0
ABDOMINAL PAIN: 0
PALPITATIONS: 0
CHILLS: 0
VOMITING: 0
SORE THROAT: 0
COUGH: 0
ARTHRALGIAS: 0
SEIZURES: 0
EYE PAIN: 0
FEVER: 0
HEMATURIA: 0
SHORTNESS OF BREATH: 0
COLOR CHANGE: 0

## 2024-10-04 ASSESSMENT — SOCIAL DETERMINANTS OF HEALTH (SDOH): HOW OFTEN DO YOU GET TOGETHER WITH FRIENDS OR RELATIVES?: TWICE A WEEK

## 2024-10-04 NOTE — ASSESSMENT & PLAN NOTE
No hypotensive symptoms. Feels well/  manual BP measurement better today. Exercises on a regular basis.  Continue current therapies.  Check BMP.

## 2024-10-04 NOTE — ASSESSMENT & PLAN NOTE
Annual exam.  Reviewed memory decline.  She is limited in her activities.  Family is involved and keeps track of her quite closely.  She declines vaccines except for influenza vaccine which was administered prior to departure.  We reviewed and decided discontinue lipids testing, colon cancer screening.  Continues to follow once a year with oncology given her history of breast cancer.

## 2024-10-04 NOTE — PATIENT INSTRUCTIONS
Patient Education   Preventive Care Advice   This is general advice given by our system to help you stay healthy. However, your care team may have specific advice just for you. Please talk to your care team about your preventive care needs.  Nutrition  Eat 5 or more servings of fruits and vegetables each day.  Try wheat bread, brown rice and whole grain pasta (instead of white bread, rice, and pasta).  Get enough calcium and vitamin D. Check the label on foods and aim for 100% of the RDA (recommended daily allowance).  Lifestyle  Exercise at least 150 minutes each week  (30 minutes a day, 5 days a week).  Do muscle strengthening activities 2 days a week. These help control your weight and prevent disease.  No smoking.  Wear sunscreen to prevent skin cancer.  Have a dental exam and cleaning every 6 months.  Yearly exams  See your health care team every year to talk about:  Any changes in your health.  Any medicines your care team has prescribed.  Preventive care, family planning, and ways to prevent chronic diseases.  Shots (vaccines)   HPV shots (up to age 26), if you've never had them before.  Hepatitis B shots (up to age 59), if you've never had them before.  COVID-19 shot: Get this shot when it's due.  Flu shot: Get a flu shot every year.  Tetanus shot: Get a tetanus shot every 10 years.  Pneumococcal, hepatitis A, and RSV shots: Ask your care team if you need these based on your risk.  Shingles shot (for age 50 and up)  General health tests  Diabetes screening:  Starting at age 35, Get screened for diabetes at least every 3 years.  If you are younger than age 35, ask your care team if you should be screened for diabetes.  Cholesterol test: At age 39, start having a cholesterol test every 5 years, or more often if advised.  Bone density scan (DEXA): At age 50, ask your care team if you should have this scan for osteoporosis (brittle bones).  Hepatitis C: Get tested at least once in your life.  STIs (sexually  transmitted infections)  Before age 24: Ask your care team if you should be screened for STIs.  After age 24: Get screened for STIs if you're at risk. You are at risk for STIs (including HIV) if:  You are sexually active with more than one person.  You don't use condoms every time.  You or a partner was diagnosed with a sexually transmitted infection.  If you are at risk for HIV, ask about PrEP medicine to prevent HIV.  Get tested for HIV at least once in your life, whether you are at risk for HIV or not.  Cancer screening tests  Cervical cancer screening: If you have a cervix, begin getting regular cervical cancer screening tests starting at age 21.  Breast cancer scan (mammogram): If you've ever had breasts, begin having regular mammograms starting at age 40. This is a scan to check for breast cancer.  Colon cancer screening: It is important to start screening for colon cancer at age 45.  Have a colonoscopy test every 10 years (or more often if you're at risk) Or, ask your provider about stool tests like a FIT test every year or Cologuard test every 3 years.  To learn more about your testing options, visit:   .  For help making a decision, visit:   https://bit.ly/wt17932.  Prostate cancer screening test: If you have a prostate, ask your care team if a prostate cancer screening test (PSA) at age 55 is right for you.  Lung cancer screening: If you are a current or former smoker ages 50 to 80, ask your care team if ongoing lung cancer screenings are right for you.  For informational purposes only. Not to replace the advice of your health care provider. Copyright   2023 [facility]. All rights reserved. Clinically reviewed by the [facility] Transitions Program. [employer] [id number] - REV 01/24.  Preventing Falls: Care Instructions  Injuries and health problems such as trouble walking or poor eyesight can increase your risk of falling. So can some medicines. But there are things you can do to help  "prevent falls. You can exercise to get stronger. You can also arrange your home to make it safer.    Talk to your doctor about the medicines you take. Ask if any of them increase the risk of falls and whether they can be changed or stopped.   Try to exercise regularly. It can help improve your strength and balance. This can help lower your risk of falling.         Practice fall safety and prevention.   Wear low-heeled shoes that fit well and give your feet good support. Talk to your doctor if you have foot problems that make this hard.  Carry a cellphone or wear a medical alert device that you can use to call for help.  Use stepladders instead of chairs to reach high objects. Don't climb if you're at risk for falls. Ask for help, if needed.  Wear the correct eyeglasses, if you need them.        Make your home safer.   Remove rugs, cords, clutter, and furniture from walkways.  Keep your house well lit. Use night-lights in hallways and bathrooms.  Install and use sturdy handrails on stairways.  Wear nonskid footwear, even inside. Don't walk barefoot or in socks without shoes.        Be safe outside.   Use handrails, curb cuts, and ramps whenever possible.  Keep your hands free by using a shoulder bag or backpack.  Try to walk in well-lit areas. Watch out for uneven ground, changes in pavement, and debris.  Be careful in the winter. Walk on the grass or gravel when sidewalks are slippery. Use de-icer on steps and walkways. Add non-slip devices to shoes.    Put grab bars and nonskid mats in your shower or tub and near the toilet. Try to use a shower chair or bath bench when bathing.   Get into a tub or shower by putting in your weaker leg first. Get out with your strong side first. Have a phone or medical alert device in the bathroom with you.   Where can you learn more?  Go to https://www.[employer].net/patiented  Enter G117 in the search box to learn more about \"Preventing Falls: Care Instructions.\"  Current as of: " July 17, 2023  Content Version: 14.2 2024 [facility] [employer].   Care instructions adapted under license by your healthcare professional. If you have questions about a medical condition or this instruction, always ask your healthcare professional. [employer] disclaims any warranty or liability for your use of this information.    Hearing Loss: Care Instructions  Overview     Hearing loss is a sudden or slow decrease in how well you hear. It can range from slight to profound. Permanent hearing loss can occur with aging. It also can happen when you are exposed long-term to loud noise. Examples include listening to loud music, riding motorcycles, or being around other loud machines.  Hearing loss can affect your work and home life. It can make you feel lonely or depressed. You may feel that you have lost your independence. But hearing aids and other devices can help you hear better and feel connected to others.  Follow-up care is a key part of your treatment and safety. Be sure to make and go to all appointments, and call your doctor if you are having problems. It's also a good idea to know your test results and keep a list of the medicines you take.  How can you care for yourself at home?  Avoid loud noises whenever possible. This helps keep your hearing from getting worse.  Always wear hearing protection around loud noises.  Wear a hearing aid as directed.  A professional can help you pick a hearing aid that will work best for you.  You can also get hearing aids over the counter for mild to moderate hearing loss.  Have hearing tests as your doctor suggests. They can show whether your hearing has changed. Your hearing aid may need to be adjusted.  Use other devices as needed. These may include:  Telephone amplifiers and hearing aids that can connect to a television, stereo, radio, or microphone.  Devices that use lights or vibrations. These alert you to the doorbell, a ringing telephone, or a baby  "monitor.  Television closed-captioning. This shows the words at the bottom of the screen. Most new TVs can do this.  TTY (text telephone). This lets you type messages back and forth on the telephone instead of talking or listening. These devices are also called TDD. When messages are typed on the keyboard, they are sent over the phone line to a receiving TTY. The message is shown on a monitor.  Use text messaging, social media, and email if it is hard for you to communicate by telephone.  Try to learn a listening technique called speechreading. It is not lipreading. You pay attention to people's gestures, expressions, posture, and tone of voice. These clues can help you understand what a person is saying. Face the person you are talking to, and have them face you. Make sure the lighting is good. You need to see the other person's face clearly.  Think about counseling if you need help to adjust to your hearing loss.  When should you call for help?  Watch closely for changes in your health, and be sure to contact your doctor if:    You think your hearing is getting worse.     You have new symptoms, such as dizziness or nausea.   Where can you learn more?  Go to https://www.[employer].net/patiented  Enter R798 in the search box to learn more about \"Hearing Loss: Care Instructions.\"  Current as of: September 27, 2023  Content Version: 14.2 2024 [facility] [employer].   Care instructions adapted under license by your healthcare professional. If you have questions about a medical condition or this instruction, always ask your healthcare professional. [employer] disclaims any warranty or liability for your use of this information.       "

## 2024-10-04 NOTE — LETTER
October 6, 2024      Sharon L Curran Schwab  [address]  [place] [address]        Dear Ms.Curran Schwab,    We are writing to inform you of your test results.    Your test results fall within the expected range(s) or remain unchanged from previous results.  Please continue with current treatment plan.    Resulted Orders   Basic metabolic panel  (Ca, Cl, CO2, Creat, Gluc, K, Na, BUN)   Result Value Ref Range    Sodium 132 (L) 135 - 145 mmol/L    Potassium 4.5 3.4 - 5.3 mmol/L    Chloride 97 (L) 98 - 107 mmol/L    Carbon Dioxide (CO2) 25 22 - 29 mmol/L    Anion Gap 10 7 - 15 mmol/L    Urea Nitrogen 28.1 (H) 8.0 - 23.0 mg/dL    Creatinine 0.65 0.51 - 0.95 mg/dL    GFR Estimate 88 >60 mL/min/1.73m2      Comment:      eGFR calculated using 2021 CKD-EPI equation.    Calcium 9.4 8.8 - 10.4 mg/dL      Comment:      Reference intervals for this test were updated on 7/16/2024 to reflect our healthy population more accurately. There may be differences in the flagging of prior results with similar values performed with this method. Those prior results can be interpreted in the context of the updated reference intervals.    Glucose 102 (H) 70 - 99 mg/dL   Hemoglobin   Result Value Ref Range    Hemoglobin 12.0 11.7 - 15.7 g/dL       If you have any questions or concerns, please call the clinic at the number listed above.       Sincerely,      Mor Chaidez MD

## 2024-10-04 NOTE — ASSESSMENT & PLAN NOTE
Resolved.  She realizes this is from pulling apart horse feeding syringes.  When she discontinued this activity her symptoms resolved.

## 2024-10-04 NOTE — PROGRESS NOTES
Preventive Care Visit  [facility] GEOVANI Chaidez MD, Family Medicine  Oct 4, 2024      Assessment & Plan   Problem List Items Addressed This Visit       RESOLVED: Atypical chest pain     Resolved.  She realizes this is from pulling apart horse feeding syringes.  When she discontinued this activity her symptoms resolved.         Benign essential hypertension     No hypotensive symptoms. Feels well/  manual BP measurement better today. Exercises on a regular basis.  Continue current therapies.  Check BMP.          Relevant Medications    lisinopril (ZESTRIL) 20 MG tablet    Other Relevant Orders    BASIC METABOLIC PANEL    Basic metabolic panel  (Ca, Cl, CO2, Creat, Gluc, K, Na, BUN)    Bilateral impacted cerumen     Left ear cleaned today by nursing staff.  Patient tolerated the procedure without difficulty.         Relevant Orders    REMOVE IMPACTED CERUMEN (Completed)    Encounter for Medicare annual wellness exam - Primary     Annual exam.  Reviewed memory decline.  She is limited in her activities.  Family is involved and keeps track of her quite closely.  She declines vaccines except for influenza vaccine which was administered prior to departure.  We reviewed and decided discontinue lipids testing, colon cancer screening.  Continues to follow once a year with oncology given her history of breast cancer.         History of urinary incontinence    Pure hypercholesterolemia      Other Visit Diagnoses       Screening for deficiency anemia        Relevant Orders    Hemoglobin (Completed)             Patient has been advised of split billing requirements and indicates understanding: Yes       Counseling  Appropriate preventive services were addressed with this patient via screening, questionnaire, or discussion as appropriate for fall prevention, nutrition, physical activity, Tobacco-use cessation, social engagement, weight loss and cognition.  Checklist reviewing preventive services  "available has been given to the patient.  Reviewed patient's diet, addressing concerns and/or questions.   She is at risk for psychosocial distress and has been provided with information to reduce risk.   Discussed possible causes of fatigue. The patient reports drinking more than 3 alcoholic drinks per day and/or more than 7 drhnks per week. The patient was counseled and given information about possible harmful effects of excessive alcohol intake.The patient was provided with written information regarding signs of hearing loss.   Information on urinary incontinence and treatment options given to patient.     Vasu Velasquez is a 79 year old, presenting for the following:  Wellness Visit        10/4/2024    12:57 PM   Additional Questions   Roomed by xl         Health Care Directive  Patient has a Health Care Directive on file  Advance care planning document is on file and is current.    BP:   - requests refills   - home measurements are 150s at home.     Fall risk:   - ongoing physical therapy.  Recent fall on a wet floor.     - she has a puppy.    Requests ear cleaning.     Memory: she admits it is poor.  She drives to and from [place].  Family is around and helps. Family limits activities.  \"I am not allowed to go in with the horses.\"         10/4/2024   General Health   How would you rate your overall physical health? Good   Feel stress (tense, anxious, or unable to sleep) Only a little      (!) STRESS CONCERN      10/4/2024   Nutrition   Diet: Regular (no restrictions)            10/4/2024   Exercise   Days per week of moderate/strenous exercise 7 days            10/4/2024   Social Factors   Frequency of gathering with friends or relatives Twice a week   Worry food won't last until get money to buy more No   Food not last or not have enough money for food? No   Do you have housing? (Housing is defined as stable permanent housing and does not include staying ouside in a car, in a tent, in an abandoned " building, in an overnight shelter, or couch-surfing.) Yes   Are you worried about losing your housing? No   Lack of transportation? No   Unable to get utilities (heat,electricity)? No            10/4/2024   Fall Risk   Fallen 2 or more times in the past year? No    Yes   Trouble with walking or balance? Yes    Yes   Gait Speed Test (Document in seconds) 5.18       Multiple values from one day are sorted in reverse-chronological order          10/4/2024   Activities of Daily Living- Home Safety   Needs help with the following daily activites None of the above   Safety concerns in the home None of the above            10/4/2024   Dental   Dentist two times every year? Yes            10/4/2024   Hearing Screening   Hearing concerns? (!) TROUBLE UNDERSTANDING SOFT OR WHISPERED SPEECH.            10/4/2024   Driving Risk Screening   Patient/family members have concerns about driving No            10/4/2024   General Alertness/Fatigue Screening   Have you been more tired than usual lately? (!) YES            10/4/2024   Urinary Incontinence Screening   Bothered by leaking urine in past 6 months Yes            10/4/2024   TB Screening   Were you born outside of the [place]? No            Today's PHQ-2 Score:       10/4/2024     1:05 PM   PHQ-2 ( 1999 [employer])   Q1: Little interest or pleasure in doing things 0   Q2: Feeling down, depressed or hopeless 0   PHQ-2 Score 0   Q1: Little interest or pleasure in doing things Not at all   Q2: Feeling down, depressed or hopeless Not at all   PHQ-2 Score 0           10/4/2024   Substance Use   Alcohol more than 3/day or more than 7/wk Yes   How often do you have a drink containing alcohol 2 to 3 times a week   How many alcohol drinks on typical day 1 or 2   How often do you have 5+ drinks at one occasion Never   Audit 2/3 Score 0   How often not able to stop drinking once started Never   How often failed to do what normally expected Never   How often needed first drink in am after a heavy  drinking session Never   How often feeling of guilt or remorse after drinking Never   How often unable to remember what happened the night before Never   Have you or someone else been injured because of your drinking No   Has anyone been concerned or suggested you cut down on drinking No   TOTAL SCORE - AUDIT 3   Do you have a current opioid prescription? No   How severe/bad is pain from 1 to 10? 0/10 (No Pain)   Do you use any other substances recreationally? No        Social History     Tobacco Use     Smoking status: Never     Passive exposure: Past     Smokeless tobacco: Never   Vaping Use     Vaping status: Never Used   Substance Use Topics     Alcohol use: Yes     Comment: 1 drink every PM     Drug use: Never             Reviewed and updated as needed this visit by Provider                    Patient Active Problem List   Diagnosis     Arthritis     History of urinary incontinence     Pure hypercholesterolemia      Osteopenia     Unexplained night sweats     Polyarthritis of hand     Primary osteoarthritis of right knee     Lymphadenopathy     Personal history of malignant neoplasm of breast     Benign essential hypertension     Encounter for Medicare annual wellness exam     Fluid level behind tympanic membrane of left ear     Bilateral impacted cerumen     Past Surgical History:   Procedure Laterality Date     HYSTERECTOMY       INCONTINENCE SURGERY       MASTECTOMY       MASTECTOMY       REPLACEMENT TOTAL KNEE      left knee       Social History     Tobacco Use     Smoking status: Never     Passive exposure: Past     Smokeless tobacco: Never   Substance Use Topics     Alcohol use: Yes     Comment: 1 drink every PM     Family History   Problem Relation Age of Onset     Cerebrovascular Disease Mother      No Known Problems Father      Heart Disease Brother      Arthritis Sister      Breast Cancer Paternal Grandmother      No Known Problems Daughter      No Known Problems Daughter      Colon Cancer No family  hx of      Prostate Cancer No family hx of          Current providers sharing in care for this patient include:  Patient Care Team:  Mor Chaidez MD as PCP - General (Family Practice)  August Arvizu MD as MD (Hematology & Oncology)  Mor Chaidez MD as Assigned PCP  Leslie Heller, RN as Specialty Care Coordinator (Hematology & Oncology)  Camryn Cooper, NP as Assigned Cancer Care Provider    The following health maintenance items are reviewed in Epic and correct as of today:  Health Maintenance   Topic Date Due     RSV VACCINE (1 - 1-dose 75+ series) Never done     BMP  04/07/2024     COVID-19 Vaccine (4 - 2024-25 season) 09/01/2024     MEDICARE ANNUAL WELLNESS VISIT  10/04/2025     ANNUAL REVIEW OF HM ORDERS  10/04/2025     FALL RISK ASSESSMENT  10/04/2025     DTAP/TDAP/TD IMMUNIZATION (2 - Td or Tdap) 03/04/2029     ADVANCE CARE PLANNING  10/04/2029     DEXA  01/09/2032     PHQ-2 (once per calendar year)  Completed     INFLUENZA VACCINE  Completed     Pneumococcal Vaccine: 65+ Years  Completed     ZOSTER IMMUNIZATION  Completed     HPV IMMUNIZATION  Aged Out     MENINGITIS IMMUNIZATION  Aged Out     RSV MONOCLONAL ANTIBODY  Aged Out     LIPID  Discontinued     COLORECTAL CANCER SCREENING  Discontinued     Review of Systems   Constitutional:  Negative for chills and fever.   HENT:  Negative for ear pain and sore throat.    Eyes:  Negative for pain and visual disturbance.   Respiratory:  Negative for cough and shortness of breath.    Cardiovascular:  Negative for chest pain and palpitations.   Gastrointestinal:  Negative for abdominal pain and vomiting.   Genitourinary:  Negative for dysuria and hematuria.   Musculoskeletal:  Negative for arthralgias and back pain.   Skin:  Negative for color change and rash.   Neurological:  Negative for seizures and syncope.   All other systems reviewed and are negative.         Objective    Exam  /80   Pulse 76   Temp 97.9  F (36.6  C) (Oral)   Resp 16    "Ht 1.613 m (5' 3.5\")   Wt 64.4 kg (142 lb)   LMP  (LMP Unknown)   SpO2 99%   BMI 24.76 kg/m     Estimated body mass index is 24.76 kg/m  as calculated from the following:    Height as of this encounter: 1.613 m (5' 3.5\").    Weight as of this encounter: 64.4 kg (142 lb).    Physical Exam  Vitals reviewed.   Constitutional:       General: She is not in acute distress.     Appearance: Normal appearance. She is not ill-appearing.   HENT:      Head: Normocephalic and atraumatic.      Right Ear: External ear normal.      Left Ear: External ear normal.      Nose: Nose normal.      Mouth/Throat:      Pharynx: Oropharynx is clear. No oropharyngeal exudate or posterior oropharyngeal erythema.   Eyes:      General: No scleral icterus.        Right eye: No discharge.         Left eye: No discharge.      Extraocular Movements: Extraocular movements intact.      Conjunctiva/sclera: Conjunctivae normal.      Pupils: Pupils are equal, round, and reactive to light.   Neck:      Comments: No thyromegaly.  Cardiovascular:      Rate and Rhythm: Normal rate and regular rhythm.      Heart sounds: Normal heart sounds. No murmur heard.     No friction rub. No gallop.   Pulmonary:      Effort: Pulmonary effort is normal. No respiratory distress.      Breath sounds: Normal breath sounds. No wheezing or rales.   Abdominal:      General: There is no distension.      Palpations: Abdomen is soft. There is no mass.      Tenderness: There is no abdominal tenderness.   Musculoskeletal:         General: No signs of injury. Normal range of motion.      Cervical back: Normal range of motion.      Right lower leg: No edema.      Left lower leg: No edema.   Lymphadenopathy:      Cervical: No cervical adenopathy.   Skin:     General: Skin is warm.      Coloration: Skin is not jaundiced.      Findings: No rash.   Neurological:      General: No focal deficit present.      Mental Status: She is alert and oriented to person, place, and time.      " Cranial Nerves: No cranial nerve deficit.      Deep Tendon Reflexes: Reflexes normal.   Psychiatric:         Mood and Affect: Mood normal.           10/4/2024   Mini Cog   Clock Draw Score 0 Abnormal   3 Item Recall 2 objects recalled   Mini Cog Total Score 2                 Signed Electronically by: Mor Chaidez MD

## 2024-10-05 LAB
ANION GAP SERPL CALCULATED.3IONS-SCNC: 10 MMOL/L (ref 7–15)
BUN SERPL-MCNC: 28.1 MG/DL (ref 8–23)
CALCIUM SERPL-MCNC: 9.4 MG/DL (ref 8.8–10.4)
CHLORIDE SERPL-SCNC: 97 MMOL/L (ref 98–107)
CREAT SERPL-MCNC: 0.65 MG/DL (ref 0.51–0.95)
EGFRCR SERPLBLD CKD-EPI 2021: 88 ML/MIN/1.73M2
GLUCOSE SERPL-MCNC: 102 MG/DL (ref 70–99)
HCO3 SERPL-SCNC: 25 MMOL/L (ref 22–29)
POTASSIUM SERPL-SCNC: 4.5 MMOL/L (ref 3.4–5.3)
SODIUM SERPL-SCNC: 132 MMOL/L (ref 135–145)

## 2024-10-07 DIAGNOSIS — I10 BENIGN ESSENTIAL HYPERTENSION: ICD-10-CM

## 2024-10-07 RX ORDER — AMLODIPINE BESYLATE 10 MG/1
10 TABLET ORAL DAILY
Qty: 90 TABLET | Refills: 2 | Status: SHIPPED | OUTPATIENT
Start: 2024-10-07

## 2024-10-29 ENCOUNTER — ONCOLOGY VISIT (OUTPATIENT)
Dept: ONCOLOGY | Facility: HOSPITAL | Age: 79
End: 2024-10-29
Attending: INTERNAL MEDICINE
Payer: MEDICARE

## 2024-10-29 VITALS
HEIGHT: 64 IN | OXYGEN SATURATION: 98 % | TEMPERATURE: 98.1 F | BODY MASS INDEX: 24.59 KG/M2 | SYSTOLIC BLOOD PRESSURE: 189 MMHG | RESPIRATION RATE: 16 BRPM | DIASTOLIC BLOOD PRESSURE: 90 MMHG | HEART RATE: 71 BPM | WEIGHT: 144 LBS

## 2024-10-29 DIAGNOSIS — Z85.3 PERSONAL HISTORY OF MALIGNANT NEOPLASM OF BREAST: Primary | ICD-10-CM

## 2024-10-29 DIAGNOSIS — R59.1 LYMPHADENOPATHY: ICD-10-CM

## 2024-10-29 PROCEDURE — 99213 OFFICE O/P EST LOW 20 MIN: CPT | Performed by: INTERNAL MEDICINE

## 2024-10-29 PROCEDURE — G0463 HOSPITAL OUTPT CLINIC VISIT: HCPCS | Performed by: INTERNAL MEDICINE

## 2024-10-29 PROCEDURE — G2211 COMPLEX E/M VISIT ADD ON: HCPCS | Performed by: INTERNAL MEDICINE

## 2024-10-29 ASSESSMENT — PAIN SCALES - GENERAL: PAINLEVEL_OUTOF10: NO PAIN (0)

## 2024-10-29 NOTE — LETTER
"10/29/2024      Sharon L Curran Schwab  [address]  [place] [place] [facility] [address]      Dear Colleague,    Thank you for referring your patient, Sharon L Curran Schwab, to the [facility]. Please see a copy of my visit note below.    Oncology Rooming Note    October 29, 2024 10:29 AM   Sharon L Curran Schwab is a 79 year old female who presents for:    Chief Complaint   Patient presents with     Oncology Clinic Visit     Personal history of malignant neoplasm of breast     Initial Vitals: BP (!) 189/90 (BP Location: Left arm, Patient Position: Sitting, Cuff Size: Adult Regular)   Pulse 71   Temp 98.1  F (36.7  C) (Oral)   Resp 16   Ht 1.613 m (5' 3.5\")   Wt 65.3 kg (144 lb)   LMP  (LMP Unknown)   SpO2 98%   BMI 25.11 kg/m   Estimated body mass index is 25.11 kg/m  as calculated from the following:    Height as of this encounter: 1.613 m (5' 3.5\").    Weight as of this encounter: 65.3 kg (144 lb). Body surface area is 1.71 meters squared.  No Pain (0) Comment: Data Unavailable   No LMP recorded (lmp unknown). Patient is postmenopausal.  Allergies reviewed: Yes  Medications reviewed: Yes    Medications: Medication refills not needed today.  Pharmacy name entered into [employer]: [id number] [address]    Frailty Screening:   Is the patient here for a new oncology consult visit in cancer care? 2. No      Clinical concerns: MD follow up        Christine Farrell LPN              [facility] Hematology and Oncology Progress Note    Patient: Sharon L Curran Schwab  MRN: [id number]  Date of Service: Oct 29, 2024         Reason for Visit    Chief Complaint   Patient presents with     Oncology Clinic Visit     Personal history of malignant neoplasm of breast       Assessment and Plan     Cancer Staging   No matching staging information was found for the patient.      ECOG Performance    0 - Independent     Pain  Pain Score: No Pain (0)    #.  History of right " breast DCIS in 1989, hormone receptor positive.  #.  History of left breast invasive lobular carcinoma in 2002, hormone receptor positive.   She is clinically well without signs and symptoms suggestive for breast cancer recurrence. She does not have any indication for systemic imaging.  I discussed that we will obtain imaging with clinical signs and symptoms concerning for breast cancer recurrence, but not routinely.   She no longer needs annual screening mammogram.   She wishes to continue annual follow-up clinical exam here.   Follow-up in 1 year for clinical exam for surveillance.  She is advised to call me sooner with any concerns.    #.  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   She has several upper limit of normal size lymphadenopathy above and below the diaphragm incidentally found in 2018.  She is completely asymptomatic.  No clinically palpable lymphadenopathy today.  I recommended  to continue clinical surveillance.    #.  Hypertension, uncontrolled.   She reported that she takes antihypertensive regularly. She also monitored her BP on regular basis.  She will continue to work with her primary care provider regarding hypertension management.    Encounter Diagnoses:    Problem List Items Addressed This Visit          Oncology Diagnoses    Personal history of malignant neoplasm of breast - Primary       Other    Lymphadenopathy       CC: Mor Chaidez MD   ______________________________________________________________________________  Diagnosis  1989- diagnosed with DCIS in the right breast by screening mammogram.  2002-left breast invasive lobular carcinoma, early stage.  No lymph node involvement.     Treatment to date  1989-right radical mastectomy and axillary lymph node dissection.  2002-left breast radical mastectomy and axillary lymph node dissection.  No adjuvant chemotherapy or radiation.  Completed 5 years of tamoxifen.  2008-hysterectomy and bilateral  "oophorectomy.    History of Present Illness    Ms. Sharon L Curran Schwab presented herself today for annual follow-up.  She reported she continues with physical therapy once a week for stability and gait. She does not use any assisted device for walking. 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  She does not have headaches, or bone pain.  No other concerns.    Review of systems  Apart from describing in HPI, the remainder of comprehensive ROS was negative.    Past History    Past Medical History:   Diagnosis Date     Atypical chest pain 04/22/2024     Breast CA (H)      Cataract of both eyes, unspecified cataract type 05/13/2021     Dysuria 11/20/2017     History of breast cancer 12/09/2016     Perianal candidiasis 06/08/2022     Skin lesion 12/03/2020       Past Surgical History:   Procedure Laterality Date     HYSTERECTOMY       INCONTINENCE SURGERY       MASTECTOMY       MASTECTOMY       REPLACEMENT TOTAL KNEE      left knee         Physical Exam    BP (!) 189/90 (BP Location: Left arm, Patient Position: Sitting, Cuff Size: Adult Regular)   Pulse 71   Temp 98.1  F (36.7  C) (Oral)   Resp 16   Ht 1.613 m (5' 3.5\")   Wt 65.3 kg (144 lb)   LMP  (LMP Unknown)   SpO2 98%   BMI 25.11 kg/m      General: alert, awake, not in acute distress  HEENT: Head: Normal, normocephalic, atraumatic.  Eye: Normal external eye, conjunctiva, lids cornea, MYRON.  Pharynx: Normal buccal mucosa. Normal pharynx.  Neck / Thyroid: Supple, no masses, nodes, nodules or enlargement.  Lymphatics: No abnormally enlarged lymph nodes.  Chest: Normal chest wall and respirations. Clear to auscultation.  Breasts: reconstructed breasts bilaterally.   Heart: S1 S2 RRR.   Abdomen: abdomen is soft without significant tenderness, masses, organomegaly or guarding  Extremities: normal strength, tone, and muscle mass  Skin: normal. no rash or abnormalities  CNS: non focal.      Lab Results    No results found for this or any previous visit (from the past " week).    Imaging    No results found.    The longitudinal plan of care for the diagnosis(es)/condition(s) as documented were addressed during this visit. Due to the added complexity in care, I will continue to support Eva in the subsequent management and with ongoing continuity of care.     21 minutes spent by me on the date of the encounter doing chart review, history and exam, documentation and further activities as noted above.    Signed by: August Arvizu MD      Again, thank you for allowing me to participate in the care of your patient.        Sincerely,        August Arvizu MD

## 2024-10-29 NOTE — PROGRESS NOTES
"Oncology Rooming Note    October 29, 2024 10:29 AM   Sharon L Curran Schwab is a 79 year old female who presents for:    Chief Complaint   Patient presents with    Oncology Clinic Visit     Personal history of malignant neoplasm of breast     Initial Vitals: BP (!) 189/90 (BP Location: Left arm, Patient Position: Sitting, Cuff Size: Adult Regular)   Pulse 71   Temp 98.1  F (36.7  C) (Oral)   Resp 16   Ht 1.613 m (5' 3.5\")   Wt 65.3 kg (144 lb)   LMP  (LMP Unknown)   SpO2 98%   BMI 25.11 kg/m   Estimated body mass index is 25.11 kg/m  as calculated from the following:    Height as of this encounter: 1.613 m (5' 3.5\").    Weight as of this encounter: 65.3 kg (144 lb). Body surface area is 1.71 meters squared.  No Pain (0) Comment: Data Unavailable   No LMP recorded (lmp unknown). Patient is postmenopausal.  Allergies reviewed: Yes  Medications reviewed: Yes    Medications: Medication refills not needed today.  Pharmacy name entered into [employer]: [facility][id number] [facility] [address] [facility]    Frailty Screening:   Is the patient here for a new oncology consult visit in cancer care? 2. No      Clinical concerns: MD follow up        Christine Farrell LPN            "

## 2024-10-29 NOTE — PROGRESS NOTES
[facility] Hematology and Oncology Progress Note    Patient: Sharon L Curran Schwab  MRN: [id number]  Date of Service: Oct 29, 2024         Reason for Visit    Chief Complaint   Patient presents with    Oncology Clinic Visit     Personal history of malignant neoplasm of breast       Assessment and Plan     Cancer Staging   No matching staging information was found for the patient.      ECOG Performance    0 - Independent     Pain  Pain Score: No Pain (0)    #.  History of right breast DCIS in 1989, hormone receptor positive.  #.  History of left breast invasive lobular carcinoma in 2002, hormone receptor positive.   She is clinically well without signs and symptoms suggestive for breast cancer recurrence. She does not have any indication for systemic imaging.  I discussed that we will obtain imaging with clinical signs and symptoms concerning for breast cancer recurrence, but not routinely.   She no longer needs annual screening mammogram.   She wishes to continue annual follow-up clinical exam here.   Follow-up in 1 year for clinical exam for surveillance.  She is advised to call me sooner with any concerns.    #.  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   She has several upper limit of normal size lymphadenopathy above and below the diaphragm incidentally found in 2018.  She is completely asymptomatic.  No clinically palpable lymphadenopathy today.  I recommended  to continue clinical surveillance.    #.  Hypertension, uncontrolled.   She reported that she takes antihypertensive regularly. She also monitored her BP on regular basis.  She will continue to work with her primary care provider regarding hypertension management.    Encounter Diagnoses:    Problem List Items Addressed This Visit          Oncology Diagnoses    Personal history of malignant neoplasm of breast - Primary       Other    Lymphadenopathy       CC: Mor Chaidez MD  "  ______________________________________________________________________________  Diagnosis  1989- diagnosed with DCIS in the right breast by screening mammogram.  2002-left breast invasive lobular carcinoma, early stage.  No lymph node involvement.     Treatment to date  1989-right radical mastectomy and axillary lymph node dissection.  2002-left breast radical mastectomy and axillary lymph node dissection.  No adjuvant chemotherapy or radiation.  Completed 5 years of tamoxifen.  2008-hysterectomy and bilateral oophorectomy.    History of Present Illness    Ms. Sharon L Curran Schwab presented herself today for annual follow-up.  She reported she continues with physical therapy once a week for stability and gait. She does not use any assisted device for walking. 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  She does not have headaches, or bone pain.  No other concerns.    Review of systems  Apart from describing in HPI, the remainder of comprehensive ROS was negative.    Past History    Past Medical History:   Diagnosis Date    Atypical chest pain 04/22/2024    Breast CA (H)     Cataract of both eyes, unspecified cataract type 05/13/2021    Dysuria 11/20/2017    History of breast cancer 12/09/2016    Perianal candidiasis 06/08/2022    Skin lesion 12/03/2020       Past Surgical History:   Procedure Laterality Date    HYSTERECTOMY      INCONTINENCE SURGERY      MASTECTOMY      MASTECTOMY      REPLACEMENT TOTAL KNEE      left knee         Physical Exam    BP (!) 189/90 (BP Location: Left arm, Patient Position: Sitting, Cuff Size: Adult Regular)   Pulse 71   Temp 98.1  F (36.7  C) (Oral)   Resp 16   Ht 1.613 m (5' 3.5\")   Wt 65.3 kg (144 lb)   LMP  (LMP Unknown)   SpO2 98%   BMI 25.11 kg/m      General: alert, awake, not in acute distress  HEENT: Head: Normal, normocephalic, atraumatic.  Eye: Normal external eye, conjunctiva, lids cornea, MYRON.  Pharynx: Normal buccal mucosa. Normal pharynx.  Neck / Thyroid: Supple, " no masses, nodes, nodules or enlargement.  Lymphatics: No abnormally enlarged lymph nodes.  Chest: Normal chest wall and respirations. Clear to auscultation.  Breasts: reconstructed breasts bilaterally.   Heart: S1 S2 RRR.   Abdomen: abdomen is soft without significant tenderness, masses, organomegaly or guarding  Extremities: normal strength, tone, and muscle mass  Skin: normal. no rash or abnormalities  CNS: non focal.      Lab Results    No results found for this or any previous visit (from the past week).    Imaging    No results found.    The longitudinal plan of care for the diagnosis(es)/condition(s) as documented were addressed during this visit. Due to the added complexity in care, I will continue to support Eva in the subsequent management and with ongoing continuity of care.     21 minutes spent by me on the date of the encounter doing chart review, history and exam, documentation and further activities as noted above.    Signed by: August Arvizu MD

## 2024-11-22 ENCOUNTER — TELEPHONE (OUTPATIENT)
Dept: FAMILY MEDICINE | Facility: CLINIC | Age: 79
End: 2024-11-22

## 2024-11-22 NOTE — TELEPHONE ENCOUNTER
Patient Quality Outreach    Patient is due for the following:   Hypertension -  BP check    Action(s) Taken:   Schedule a nurse only visit for BP check    Type of outreach:    Sent letter.    Questions for provider review:    None           Michele Sin MA  Chart routed to Care Team.

## 2024-11-22 NOTE — LETTER
Eva Shepard Schwab     [id number] [place]    [facility] [address]           11/22/2024    Dear Eva,     In reviewing your records, we have determined a gap in your preventive services. Based on your age and health history, we recommend the follow service.       Blood pressure      If you have had the service elsewhere, please contact us so we can update our records. Please let us know if you have transferred your care to another clinic.    Please call [phone] to schedule this appointment.    We believe that a strong preventive care program, including regular physicals and follow-up care is an important part of a healthy lifestyle and we are committed to helping you maintain your health.    Thank you for choosing us as your health care provider.    Sincerely,     [facility]

## 2025-01-02 NOTE — ASSESSMENT & PLAN NOTE
Pressure is elevated today in the clinic at 168/89.  Reports that her blood pressures at home have been in the 50s and 160s systolic and in the 80s diastolic.  Currently managed with lisinopril 20 mg daily and amlodipine 10 mg daily.  BMP completed October 2024 demonstrated sodium slightly low 132.  Creatinine 0.65 GFR 88.    After discussion, we elected to start hydrochlorothiazide 12.5 mg daily.  She is to continue lisinopril 20 mg daily and amlodipine 10 mg daily.  I would like her to follow-up in the clinic in 2 weeks for a BMP and nurse blood pressure check.

## 2025-01-09 ENCOUNTER — OFFICE VISIT (OUTPATIENT)
Dept: FAMILY MEDICINE | Facility: CLINIC | Age: 80
End: 2025-01-09
Payer: COMMERCIAL

## 2025-01-09 VITALS
HEART RATE: 75 BPM | HEIGHT: 64 IN | DIASTOLIC BLOOD PRESSURE: 89 MMHG | BODY MASS INDEX: 23.9 KG/M2 | RESPIRATION RATE: 14 BRPM | SYSTOLIC BLOOD PRESSURE: 168 MMHG | TEMPERATURE: 98 F | OXYGEN SATURATION: 98 % | WEIGHT: 140 LBS

## 2025-01-09 DIAGNOSIS — I10 BENIGN ESSENTIAL HYPERTENSION: Primary | ICD-10-CM
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
## 2025-01-09 DIAGNOSIS — R61 NIGHT SWEATS: ICD-10-CM

## 2025-01-09 DIAGNOSIS — N30.00 ACUTE CYSTITIS WITHOUT HEMATURIA: ICD-10-CM

## 2025-01-09 LAB
ALBUMIN UR-MCNC: 30 MG/DL
APPEARANCE UR: CLEAR
BACTERIA #/AREA URNS HPF: ABNORMAL /HPF
BASOPHILS # BLD AUTO: 0 10E3/UL (ref 0–0.2)
BASOPHILS NFR BLD AUTO: 0 %
BILIRUB UR QL STRIP: NEGATIVE
COLOR UR AUTO: YELLOW
EOSINOPHIL # BLD AUTO: 0 10E3/UL (ref 0–0.7)
EOSINOPHIL NFR BLD AUTO: 1 %
ERYTHROCYTE [DISTWIDTH] IN BLOOD BY AUTOMATED COUNT: 11.9 % (ref 10–15)
GLUCOSE UR STRIP-MCNC: NEGATIVE MG/DL
HCT VFR BLD AUTO: 34.1 % (ref 35–47)
HGB BLD-MCNC: 12.2 G/DL (ref 11.7–15.7)
HGB UR QL STRIP: ABNORMAL
IMM GRANULOCYTES # BLD: 0 10E3/UL
IMM GRANULOCYTES NFR BLD: 0 %
KETONES UR STRIP-MCNC: NEGATIVE MG/DL
LEUKOCYTE ESTERASE UR QL STRIP: ABNORMAL
LYMPHOCYTES # BLD AUTO: 0.9 10E3/UL (ref 0.8–5.3)
LYMPHOCYTES NFR BLD AUTO: 19 %
MCH RBC QN AUTO: 30.5 PG (ref 26.5–33)
MCHC RBC AUTO-ENTMCNC: 35.8 G/DL (ref 31.5–36.5)
MCV RBC AUTO: 85 FL (ref 78–100)
MONOCYTES # BLD AUTO: 0.6 10E3/UL (ref 0–1.3)
MONOCYTES NFR BLD AUTO: 12 %
NEUTROPHILS # BLD AUTO: 3.2 10E3/UL (ref 1.6–8.3)
NEUTROPHILS NFR BLD AUTO: 68 %
NITRATE UR QL: POSITIVE
PH UR STRIP: 7 [PH] (ref 5–8)
PLATELET # BLD AUTO: 199 10E3/UL (ref 150–450)
RBC # BLD AUTO: 4 10E6/UL (ref 3.8–5.2)
RBC #/AREA URNS AUTO: ABNORMAL /HPF
RENAL EPI CELLS #/AREA URNS HPF: ABNORMAL /HPF
SP GR UR STRIP: 1.02 (ref 1–1.03)
SQUAMOUS #/AREA URNS AUTO: ABNORMAL /LPF
UROBILINOGEN UR STRIP-ACNC: 0.2 E.U./DL
WBC # BLD AUTO: 4.7 10E3/UL (ref 4–11)
WBC #/AREA URNS AUTO: ABNORMAL /HPF

## 2025-01-09 RX ORDER — NITROFURANTOIN 25; 75 MG/1; MG/1
100 CAPSULE ORAL 2 TIMES DAILY
Qty: 10 CAPSULE | Refills: 0 | Status: SHIPPED | OUTPATIENT
Start: 2025-01-09 | End: 2025-01-14

## 2025-01-09 RX ORDER — HYDROCHLOROTHIAZIDE 12.5 MG/1
12.5 TABLET ORAL DAILY
Qty: 90 TABLET | Refills: 0 | Status: SHIPPED | OUTPATIENT
Start: 2025-01-09

## 2025-01-09 ASSESSMENT — PAIN SCALES - GENERAL: PAINLEVEL_OUTOF10: NO PAIN (0)

## 2025-01-09 NOTE — PROGRESS NOTES
The longitudinal plan of care for the diagnosis(es)/condition(s) as documented were addressed during this visit. Due to the added complexity in care, I will continue to support Eva in the subsequent management and with ongoing continuity of care.    Assessment & Plan   Problem List Items Addressed This Visit       Night sweats     Presents today for evaluation of night sweats that began 2 weeks ago.  Of note, this started prior to the horse bite that occurred 11 days ago.  She states she had 1 episodes in which she woke up and the sheets were soaked.  She had to get up and change her pajamas on the sheets.  No associated symptoms.  No fevers.  No weight loss.  No symptoms of infection such as cough, sinus drainage or urinary symptoms.    Will proceed with a workup for night sweats including urinalysis, CBC, TSH, TB Gold, CRP, LFTs and BMP.  Will follow-up with Eva once the additional testing has been completed.         Relevant Orders    UA Macroscopic with reflex to Microscopic and Culture - Lab Collect    CBC with platelets and differential    TSH with free T4 reflex    Quantiferon TB Gold Plus    CRP, inflammation    Hepatic panel (Albumin, ALT, AST, Bili, Alk Phos, TP)    Basic metabolic panel  (Ca, Cl, CO2, Creat, Gluc, K, Na, BUN)    Benign essential hypertension - Primary     Pressure is elevated today in the clinic at 168/89.  Reports that her blood pressures at home have been in the 50s and 160s systolic and in the 80s diastolic.  Currently managed with lisinopril 20 mg daily and amlodipine 10 mg daily.  BMP completed October 2024 demonstrated sodium slightly low 132.  Creatinine 0.65 GFR 88.    After discussion, we elected to start hydrochlorothiazide 12.5 mg daily.  She is to continue lisinopril 20 mg daily and amlodipine 10 mg daily.  I would like her to follow-up in the clinic in 2 weeks for a BMP and nurse blood pressure check.         Relevant Medications    hydroCHLOROthiazide 12.5 MG tablet     "Other Relevant Orders    Basic metabolic panel  (Ca, Cl, CO2, Creat, Gluc, K, Na, BUN)    COVID-19 12+ (PFIZER) (Completed)    PRIMARY CARE FOLLOW-UP SCHEDULING    Horse bite, initial encounter     Presents today for evaluation of a horse bite that occurred 11 days ago.  She states that the horse bit through her coat and caused 2 puncture wounds and a large bruise.  On exam today there is no evidence of infection.  She states that the horses immunizations were up-to-date including the rabies.  There is no evidence of infection I do not feel antibiotics are warranted today.           BMI  Estimated body mass index is 24.41 kg/m  as calculated from the following:    Height as of this encounter: 1.613 m (5' 3.5\").    Weight as of this encounter: 63.5 kg (140 lb).       Vasu Velasquez is a 79 year old, presenting for the following health issues:  Night Sweats and Hypertension        1/9/2025     2:44 PM   Additional Questions   Roomed by Anca VANN   Accompanied by Self     History of Present Illness       Reason for visit:  Night sweat  Symptom onset:  3-7 days ago  Symptoms include:  Night sweat  Symptom intensity:  Mild  Symptom progression:  Improving  Had these symptoms before:  No  What makes it worse:  Holding my arm  What makes it better:  Leavinglone   She is taking medications regularly.         Objective    /89 (BP Location: Left arm, Patient Position: Sitting, Cuff Size: Adult Regular)   Pulse 75   Temp 98  F (36.7  C) (Oral)   Resp 14   Ht 1.613 m (5' 3.5\")   Wt 63.5 kg (140 lb)   LMP  (LMP Unknown)   SpO2 98%   BMI 24.41 kg/m    Body mass index is 24.41 kg/m .  Physical Exam  Vitals and nursing note reviewed.   Constitutional:       Appearance: Normal appearance.   Cardiovascular:      Rate and Rhythm: Normal rate and regular rhythm.      Heart sounds: Normal heart sounds.   Pulmonary:      Effort: Pulmonary effort is normal.      Breath sounds: Normal breath sounds.   Neurological:      " Mental Status: She is alert.             Signed Electronically by: Camila Calles PA-C

## 2025-01-09 NOTE — ASSESSMENT & PLAN NOTE
Presents today for evaluation of a horse bite that occurred 11 days ago.  She states that the horse bit through her coat and caused 2 puncture wounds and a large bruise.  On exam today there is no evidence of infection.  She states that the horses immunizations were up-to-date including the rabies.  There is no evidence of infection I do not feel antibiotics are warranted today.

## 2025-01-09 NOTE — ASSESSMENT & PLAN NOTE
Presents today for evaluation of night sweats that began 2 weeks ago.  Of note, this started prior to the horse bite that occurred 11 days ago.  She states she had 1 episodes in which she woke up and the sheets were soaked.  She had to get up and change her pajamas on the sheets.  No associated symptoms.  No fevers.  No weight loss.  No symptoms of infection such as cough, sinus drainage or urinary symptoms.    Will proceed with a workup for night sweats including urinalysis, CBC, TSH, TB Gold, CRP, LFTs and BMP.  Will follow-up with Eva once the additional testing has been completed.

## 2025-01-11 LAB
BACTERIA UR CULT: NORMAL
GAMMA INTERFERON BACKGROUND BLD IA-ACNC: 0.04 IU/ML
M TB IFN-G BLD-IMP: NEGATIVE
M TB IFN-G CD4+ BCKGRND COR BLD-ACNC: 4.15 IU/ML
MITOGEN IGNF BCKGRD COR BLD-ACNC: 0 IU/ML
MITOGEN IGNF BCKGRD COR BLD-ACNC: 0.01 IU/ML
QUANTIFERON MITOGEN: 4.19 IU/ML
QUANTIFERON NIL TUBE: 0.04 IU/ML
QUANTIFERON TB1 TUBE: 0.04 IU/ML
QUANTIFERON TB2 TUBE: 0.05

## 2025-01-13 ENCOUNTER — TELEPHONE (OUTPATIENT)
Dept: FAMILY MEDICINE | Facility: CLINIC | Age: 80
End: 2025-01-13
Payer: MEDICARE

## 2025-01-13 NOTE — TELEPHONE ENCOUNTER
"Called patient and relayed below message from Camila Calles PA-C as written.  Patient states a nurse she talked with told her it's most likely due to her hysterectomy and she's \"always had hot flashes\" but only ever one episode of night sweats, and none since then.      2 week follow up appointment scheduled with Camila Calles PA-C on 1/23/25 at 9:00am.  No further questions/concerns at this time.      Kerrie Mast RN  [facility] [place]     ============================================    ----- Message from Camila Calles sent at 1/12/2025 12:10 PM CST -----  Please let patient know that the TB Gold test was negative.  I would like her to follow-up in the clinic in a couple of weeks after treatment for urinary tract infection.  If her night sweats persist will need to consider additional evaluation.  Thank you.  "

## 2025-01-13 NOTE — ASSESSMENT & PLAN NOTE
She was initially seen 1/9/25 after one episode of night sweats.  Urinalysis demonstared WBD 25-50. Positive nitrates and leukocyte esterase.  Treated with macrobid twice daily for 5 days.  Final culture showed mixed urogenit nazia.  CBC, CRP, TSH, TB gold, LFT's, BMP were normal.  She presents today for follow-up.    Of note, she has a history of breast cancer and probable low-grade lymphoma or lymphoproliferative neoplasm. She has several upper limit of normal size lymphadenopathy above and below the diaphragm incidentally found in 2018.  The results of her previous CT completed in March 2024.    Since our last visit she has had no recurrent night sweats.  No weight loss.  Appetite has remained stable.  No lymphadenopathy noted on exam today.  We discussed that the next option would be to proceed with a CT of the chest abdomen and pelvis.  After discussion she elected to hold off on this at this time as she has had no recurrent symptoms of night sweats.  We discussed that if this recurs we will proceed with additional imaging.  She will continue to follow with oncology.    Previous chest CT 3/2024:  FINDINGS:   LUNGS AND PLEURA: Limited bands of atelectasis are present in the lower lobes along the posterior costal pleura. No interlobular septal thickening or focal airspace. The trachea and central airways are patent and normal caliber. Minimal secretions in the   peripheral airway at the left apex. 2 mm nodule in the right lower lobe posteriorly (series 3, image 209) and 2-3 mm right lower lobe nodule near the lateral major fissure (series 3, image 206).. No pleural space abnormality.     MEDIASTINUM: Cardiac chambers are normal in size. Mild calcification of aortic valve leaflets. Nonaneurysmal thoracic aorta. Conventional arch anatomy. Moderate prominently calcified plaque in the proximal left subclavian artery. The thyroid gland is   normal. No enlarged mediastinal or hilar lymph nodes. Minimal sliding-type  hiatus hernia.     CORONARY ARTERY CALCIFICATION: Mild.     UPPER ABDOMEN: No significant finding.     MUSCULOSKELETAL: Leftward convexity curvature of the lumbar spine including right lateral listhesis of T12 on L1. Small degenerative thoracic spine osteophytes. Bilateral breast implants. There is contained rupture of the right breast implant. There are   normal-sized lymph nodes in both axilla.                                                               IMPRESSION:      1.  No findings to suggest an active neoplastic process in the chest.  2.  Unchanged pulmonary micronodules in the right lower lobe. No new lung nodules.

## 2025-01-23 ENCOUNTER — OFFICE VISIT (OUTPATIENT)
Dept: FAMILY MEDICINE | Facility: CLINIC | Age: 80
End: 2025-01-23
Payer: COMMERCIAL

## 2025-01-23 VITALS
RESPIRATION RATE: 16 BRPM | OXYGEN SATURATION: 98 % | DIASTOLIC BLOOD PRESSURE: 87 MMHG | BODY MASS INDEX: 24.07 KG/M2 | HEART RATE: 63 BPM | HEIGHT: 64 IN | SYSTOLIC BLOOD PRESSURE: 202 MMHG | TEMPERATURE: 97.6 F | WEIGHT: 141 LBS

## 2025-01-23 DIAGNOSIS — Z85.3 PERSONAL HISTORY OF MALIGNANT NEOPLASM OF BREAST: ICD-10-CM

## 2025-01-23 DIAGNOSIS — I10 BENIGN ESSENTIAL HYPERTENSION: ICD-10-CM

## 2025-01-23 DIAGNOSIS — R61 NIGHT SWEATS: Primary | ICD-10-CM

## 2025-01-23 LAB
ANION GAP SERPL CALCULATED.3IONS-SCNC: 9 MMOL/L (ref 7–15)
BUN SERPL-MCNC: 28.7 MG/DL (ref 8–23)
CALCIUM SERPL-MCNC: 9.9 MG/DL (ref 8.8–10.4)
CHLORIDE SERPL-SCNC: 96 MMOL/L (ref 98–107)
CREAT SERPL-MCNC: 0.66 MG/DL (ref 0.51–0.95)
EGFRCR SERPLBLD CKD-EPI 2021: 88 ML/MIN/1.73M2
GLUCOSE SERPL-MCNC: 102 MG/DL (ref 70–99)
HCO3 SERPL-SCNC: 26 MMOL/L (ref 22–29)
POTASSIUM SERPL-SCNC: 4.3 MMOL/L (ref 3.4–5.3)
SODIUM SERPL-SCNC: 131 MMOL/L (ref 135–145)

## 2025-01-23 NOTE — PROGRESS NOTES
The longitudinal plan of care for the diagnosis(es)/condition(s) as documented were addressed during this visit. Due to the added complexity in care, I will continue to support Eva in the subsequent management and with ongoing continuity of care.    Assessment & Plan   Problem List Items Addressed This Visit       Night sweats - Primary     She was initially seen 1/9/25 after one episode of night sweats.  Urinalysis demonstared WBD 25-50. Positive nitrates and leukocyte esterase.  Treated with macrobid twice daily for 5 days.  Final culture showed mixed urogenit nazia.  CBC, CRP, TSH, TB gold, LFT's, BMP were normal.  She presents today for follow-up.    Of note, she has a history of breast cancer and probable low-grade lymphoma or lymphoproliferative neoplasm. She has several upper limit of normal size lymphadenopathy above and below the diaphragm incidentally found in 2018.  The results of her previous CT completed in March 2024.    Since our last visit she has had no recurrent night sweats.  No weight loss.  Appetite has remained stable.  No lymphadenopathy noted on exam today.  We discussed that the next option would be to proceed with a CT of the chest abdomen and pelvis.  After discussion she elected to hold off on this at this time as she has had no recurrent symptoms of night sweats.  We discussed that if this recurs we will proceed with additional imaging.  She will continue to follow with oncology.    Previous chest CT 3/2024:  FINDINGS:   LUNGS AND PLEURA: Limited bands of atelectasis are present in the lower lobes along the posterior costal pleura. No interlobular septal thickening or focal airspace. The trachea and central airways are patent and normal caliber. Minimal secretions in the   peripheral airway at the left apex. 2 mm nodule in the right lower lobe posteriorly (series 3, image 209) and 2-3 mm right lower lobe nodule near the lateral major fissure (series 3, image 206).. No pleural space  abnormality.     MEDIASTINUM: Cardiac chambers are normal in size. Mild calcification of aortic valve leaflets. Nonaneurysmal thoracic aorta. Conventional arch anatomy. Moderate prominently calcified plaque in the proximal left subclavian artery. The thyroid gland is   normal. No enlarged mediastinal or hilar lymph nodes. Minimal sliding-type hiatus hernia.     CORONARY ARTERY CALCIFICATION: Mild.     UPPER ABDOMEN: No significant finding.     MUSCULOSKELETAL: Leftward convexity curvature of the lumbar spine including right lateral listhesis of T12 on L1. Small degenerative thoracic spine osteophytes. Bilateral breast implants. There is contained rupture of the right breast implant. There are   normal-sized lymph nodes in both axilla.                                                               IMPRESSION:      1.  No findings to suggest an active neoplastic process in the chest.  2.  Unchanged pulmonary micronodules in the right lower lobe. No new lung nodules.            Personal history of malignant neoplasm of breast     Follow with oncology.  CT chest 3/2024:  FINDINGS:   LUNGS AND PLEURA: Limited bands of atelectasis are present in the lower lobes along the posterior costal pleura. No interlobular septal thickening or focal airspace. The trachea and central airways are patent and normal caliber. Minimal secretions in the   peripheral airway at the left apex. 2 mm nodule in the right lower lobe posteriorly (series 3, image 209) and 2-3 mm right lower lobe nodule near the lateral major fissure (series 3, image 206).. No pleural space abnormality.     MEDIASTINUM: Cardiac chambers are normal in size. Mild calcification of aortic valve leaflets. Nonaneurysmal thoracic aorta. Conventional arch anatomy. Moderate prominently calcified plaque in the proximal left subclavian artery. The thyroid gland is   normal. No enlarged mediastinal or hilar lymph nodes. Minimal sliding-type hiatus hernia.     CORONARY ARTERY  CALCIFICATION: Mild.     UPPER ABDOMEN: No significant finding.     MUSCULOSKELETAL: Leftward convexity curvature of the lumbar spine including right lateral listhesis of T12 on L1. Small degenerative thoracic spine osteophytes. Bilateral breast implants. There is contained rupture of the right breast implant. There are   normal-sized lymph nodes in both axilla.                                                                      IMPRESSION:      1.  No findings to suggest an active neoplastic process in the chest.  2.  Unchanged pulmonary micronodules in the right lower lobe. No new lung nodules.         Benign essential hypertension     Blood pressure is elevated today at 200/81 initially.  She is currently on lisinopril 20 mg daily and amlodipine 10 mg daily.  She was previously started on hydrochlorothiazide 12.5 mg daily on 1/9/2025.  Recheck of blood pressure remains elevated at 202/87.  Pulse is 63 today.  I am going to review this with her PCP to determine if a low-dose metoprolol would be appropriate at this time.  I will inform patient of recommendation once I hear back.  She was comfortable with this plan.         Relevant Orders    Basic metabolic panel  (Ca, Cl, CO2, Creat, Gluc, K, Na, BUN)        Vasu Velasquez is a 79 year old, presenting for the following health issues:  Follow up UTI        1/23/2025     8:49 AM   Additional Questions   Roomed by as   Accompanied by self         1/23/2025     8:49 AM   Patient Reported Additional Medications   Patient reports taking the following new medications no     History of Present Illness       Reason for visit:  Night sweat  Symptom onset:  3-7 days ago  Symptoms include:  Night sweat  Symptom intensity:  Mild  Symptom progression:  Improving  Had these symptoms before:  No  What makes it worse:  Holding my arm  What makes it better:  Leavinglone   She is taking medications regularly.             Objective    BP (!) 202/87 (BP Location: Left arm,  "Patient Position: Left side, Cuff Size: Adult Regular)   Pulse 63   Temp 97.6  F (36.4  C) (Oral)   Resp 16   Ht 1.613 m (5' 3.5\")   Wt 64 kg (141 lb)   LMP  (LMP Unknown)   SpO2 98%   BMI 24.59 kg/m    Body mass index is 24.59 kg/m .  Physical Exam  Vitals and nursing note reviewed.   Constitutional:       Appearance: Normal appearance.   HENT:      Head: Normocephalic.   Neck:      Comments: No cervical, clavicular or axillary lymphadenopathy noted on exam.  Lymphadenopathy:      Cervical: No cervical adenopathy.   Neurological:      Mental Status: She is alert.   Psychiatric:         Mood and Affect: Mood normal.         Behavior: Behavior normal.         Thought Content: Thought content normal.         Judgment: Judgment normal.                Signed Electronically by: Camila Calles PA-C    "

## 2025-01-23 NOTE — Clinical Note
I saw Eva today in follow-up to night sweats.  Her blood pressure remains elevated after adding hydrochlorothiazide at her previous visit.  She is currently on lisinopril 20 mg, amlodipine 10 mg hydrochlorothiazide 12.5 mg daily.  Pulse is 63 today.  How would you recommend proceeding?  I was wondering if a low-dose metoprolol would be appropriate given her pulse?  Please advise.  Thank you, Reyna

## 2025-01-23 NOTE — ASSESSMENT & PLAN NOTE
Blood pressure is elevated today at 200/81 initially.  She is currently on lisinopril 20 mg daily and amlodipine 10 mg daily.  She was previously started on hydrochlorothiazide 12.5 mg daily on 1/9/2025.  Recheck of blood pressure remains elevated at 202/87.  Pulse is 63 today.  I am going to review this with her PCP to determine if a low-dose metoprolol would be appropriate at this time.  I will inform patient of recommendation once I hear back.  She was comfortable with this plan.

## 2025-01-23 NOTE — ASSESSMENT & PLAN NOTE
Follow with oncology.  CT chest 3/2024:  FINDINGS:   LUNGS AND PLEURA: Limited bands of atelectasis are present in the lower lobes along the posterior costal pleura. No interlobular septal thickening or focal airspace. The trachea and central airways are patent and normal caliber. Minimal secretions in the   peripheral airway at the left apex. 2 mm nodule in the right lower lobe posteriorly (series 3, image 209) and 2-3 mm right lower lobe nodule near the lateral major fissure (series 3, image 206).. No pleural space abnormality.     MEDIASTINUM: Cardiac chambers are normal in size. Mild calcification of aortic valve leaflets. Nonaneurysmal thoracic aorta. Conventional arch anatomy. Moderate prominently calcified plaque in the proximal left subclavian artery. The thyroid gland is   normal. No enlarged mediastinal or hilar lymph nodes. Minimal sliding-type hiatus hernia.     CORONARY ARTERY CALCIFICATION: Mild.     UPPER ABDOMEN: No significant finding.     MUSCULOSKELETAL: Leftward convexity curvature of the lumbar spine including right lateral listhesis of T12 on L1. Small degenerative thoracic spine osteophytes. Bilateral breast implants. There is contained rupture of the right breast implant. There are   normal-sized lymph nodes in both axilla.                                                                      IMPRESSION:      1.  No findings to suggest an active neoplastic process in the chest.  2.  Unchanged pulmonary micronodules in the right lower lobe. No new lung nodules.

## 2025-01-24 ENCOUNTER — TELEPHONE (OUTPATIENT)
Dept: FAMILY MEDICINE | Facility: CLINIC | Age: 80
End: 2025-01-24
Payer: MEDICARE

## 2025-01-24 NOTE — TELEPHONE ENCOUNTER
"Left message to call back for:  Eva  Information to relay to patient:  Please transfer patient to [facility] RN to discuss below provider message.      Kerrie Mast, DIONNA  [facility]     ===========================================    Provider message was copied/pasted below from routing comments:    \"Camila Calles PA-C to [facility]   1/24/25  8:20 AM  Please let patient know that I reviewed elevated blood pressure with Dr. Hemphill.  We would like her to increase lisinopril to 40 mg daily.  She is to follow-up in 1 week for a nurse blood pressure check.  I have sent the new prescription to her pharmacy.  Thank you.\"          "

## 2025-01-28 NOTE — TELEPHONE ENCOUNTER
Called Eva and relayed Camila Calles' message below.  Eva understands.  Appointment scheduled next week for BP check.

## 2025-02-04 ENCOUNTER — ALLIED HEALTH/NURSE VISIT (OUTPATIENT)
Dept: FAMILY MEDICINE | Facility: CLINIC | Age: 80
End: 2025-02-04
Payer: COMMERCIAL

## 2025-02-04 VITALS — SYSTOLIC BLOOD PRESSURE: 183 MMHG | DIASTOLIC BLOOD PRESSURE: 84 MMHG

## 2025-02-04 DIAGNOSIS — I10 BENIGN ESSENTIAL HYPERTENSION: Primary | ICD-10-CM

## 2025-02-04 PROCEDURE — 99207 PR NO CHARGE NURSE ONLY: CPT

## 2025-02-04 NOTE — PROGRESS NOTES
I met with Sharon L Curran Schwab at the request of Camila Calles PA-C  to recheck her blood pressure.  Blood pressure medications on the med list were reviewed with patient.    Patient has taken all medications as per usual regimen: Yes  Patient reports tolerating them without any issues or concerns: Yes    Vitals:    02/04/25 0916 02/04/25 0925   BP: (!) 204/83 (!) 183/84   BP Location: Left arm Left arm   Patient Position: Sitting Sitting   Cuff Size: Adult Regular Adult Regular       After 5 minutes, the patient's blood pressure remained greater than or equal to 140/90.    Is the patient currently having any chest pain? No  Does the patient currently have a headache? No  Does the patient currently have any vision changes? No  Does the patient currently have any nausea? No  Does the patient currently have any abdominal pain? No    The previous encounter was reviewed.  The patient was discharged and the note will be sent to the provider for final review.

## 2025-02-05 RX ORDER — HYDROCHLOROTHIAZIDE 25 MG/1
25 TABLET ORAL DAILY
Qty: 90 TABLET | Refills: 0 | Status: SHIPPED | OUTPATIENT
Start: 2025-02-05

## 2025-02-05 NOTE — PROGRESS NOTES
Blood pressure recheck remains extremely high.  Increase hydrochlorothiazide to 25 mg.  Dose adjusted with pharmacy.  I will see the patient on 2/21 to review progress.

## 2025-02-21 PROBLEM — R61 NIGHT SWEATS: Status: RESOLVED | Noted: 2017-04-03 | Resolved: 2025-02-21

## 2025-02-27 ENCOUNTER — ALLIED HEALTH/NURSE VISIT (OUTPATIENT)
Dept: FAMILY MEDICINE | Facility: CLINIC | Age: 80
End: 2025-02-27
Payer: COMMERCIAL

## 2025-02-27 ENCOUNTER — NURSE TRIAGE (OUTPATIENT)
Dept: FAMILY MEDICINE | Facility: CLINIC | Age: 80
End: 2025-02-27

## 2025-02-27 VITALS — OXYGEN SATURATION: 99 % | HEART RATE: 71 BPM | DIASTOLIC BLOOD PRESSURE: 69 MMHG | SYSTOLIC BLOOD PRESSURE: 131 MMHG

## 2025-02-27 DIAGNOSIS — I10 BENIGN ESSENTIAL HYPERTENSION: Primary | ICD-10-CM

## 2025-02-27 NOTE — PROGRESS NOTES
I met with Sharon L Curran Schwab at the request of (self-directed) to recheck her blood pressure.  Blood pressure medications on the med list were reviewed with patient.    Patient has taken all medications as per usual regimen: Yes  Patient reports tolerating them without any issues or concerns: Yes    Vitals:    02/27/25 0810   BP: 131/69   BP Location: Left arm   Patient Position: Sitting   Cuff Size: Adult Regular   Pulse: 71   SpO2: 99%       Blood pressure was taken, previous encounter was reviewed, recorded blood pressure below 140/90.  Patient was discharged and the note will be sent to the provider for final review.

## 2025-02-27 NOTE — Clinical Note
Fyi only- this was a self directed BP check after 2hr nosebleed yesterday. See triage for more info prn.

## 2025-02-27 NOTE — TELEPHONE ENCOUNTER
Nurse Triage SBAR    Is this a 2nd Level Triage? NO    Situation: Patient with nosebleed yesterday that lasted approximately 2hrs    Background: History HTN with recent adjustments to medications. On ASA 81, no other blood thinners    Assessment: Patient reports nosebleed occurred yesterday morning and lasted about 2hrs. She did take an advil yesterday morning but wonders if nose bleed was due to increased dose of hydrochlorothiazide.   She denies any associated symptoms such as lightheadedness/dizziness, headaches, visual changes. Did not take a home BP measurement yesterday or today.       Protocol Recommended Disposition:   Home Care    Recommendation: Reassurance given, stated this could be due to combination of ASA and advil and dry air. Also encouraged patient to take home BP reading at time of call, she states she would prefer to just come into clinic for BP check. Appointment scheduled  for 0800 this morning. Will discuss prevention of nosebleeds and proper ways to stop a nosebleed at time of nurse only visit.       Does the patient meet one of the following criteria for ADS visit consideration? 16+ years old, with an [facility] PCP     TIP  Providers, please consider if this condition is appropriate for management at one of our Acute and Diagnostic Services sites.     If patient is a good candidate, please use dotphrase <dot>triageresponse and select Refer to ADS to document.      Reason for Disposition   Mild-moderate nosebleed and bleeding has stopped now    Additional Information   Negative: Fainted (passed out), or too weak to stand following large blood loss   Negative: Sounds like a life-threatening emergency to the triager   Negative: Nosebleed followed nose injury   Negative: Bleeding present > 30 minutes and using correct method of direct pressure   Negative: Bleeding now and second call after being instructed in correct technique of direct pressure   Negative: Feeling weak or lightheaded (e.g., woozy,  "feeling like they might faint)   Negative: Pale skin (pallor) of new-onset or getting worse   Negative: Has nasal packing (inserted by health care provider to control bleeding) and now has new rash   Negative: Has nasal packing and now has bleeding around the packing  (Exception: Few drops or ooze.)   Negative: Patient sounds very sick or weak to the triager   Negative: Large amount of blood has been lost (e.g., one cup)   Negative: Bleeding recurs 3 or more times in 24 hours despite direct pressure   Negative: Taking Coumadin (warfarin) or other strong blood thinner, or known bleeding disorder (e.g., thrombocytopenia)   Negative: Has skin bruises or bleeding gums that are not caused by an injury   Negative: Has nasal packing and now has fever > 100.4 F (38.0 C)   Negative: Patient wants to be seen   Negative: Has nasal packing (inserted by health care provider to control bleeding)   Negative: Hard-to-stop nosebleeds are a chronic symptom (recurrent or ongoing AND lasting > 4 weeks)   Negative: Easy bleeding present in other family members    Answer Assessment - Initial Assessment Questions  1. AMOUNT OF BLEEDING: \"How bad is the bleeding?\" \"How much blood was lost?\" \"Has the bleeding stopped?\"      Nose bleed occurred yesterday, has stopped. Lasted approximately 2hrs  2. ONSET: \"When did the nosebleed start?\"       *No Answer*  3. FREQUENCY: \"How many nosebleeds have you had in the last 24 hours?\"       1  4. RECURRENT SYMPTOMS: \"Have there been other recent nosebleeds?\" If Yes, ask: \"How long did it take you to stop the bleeding?\" \"What worked best?\"       no  5. CAUSE: \"What do you think caused this nosebleed?\"      Unsure, wonders if related to increase dose of hydrochlorothiazide   6. LOCAL FACTORS: \"Do you have any cold symptoms?\", \"Have you been rubbing or picking at your nose?\"      No   7. SYSTEMIC FACTORS: \"Do you have high blood pressure or any bleeding problems?\"      Recent issues with HTN, has not " "taken any blood pressure readings at home yesterday or today   8. BLOOD THINNERS: \"Do you take any blood thinners?\" (e.g., aspirin, clopidogrel / Plavix, coumadin, heparin). Notes: Other strong blood thinners include: Arixtra (fondaparinux), Eliquis (apixaban), Pradaxa (dabigatran), and Xarelto (rivaroxaban).      ASA 81 daily, took an advil yesterday morning  9. OTHER SYMPTOMS: \"Do you have any other symptoms?\" (e.g., lightheadedness)      No   10. PREGNANCY: \"Is there any chance you are pregnant?\" \"When was your last menstrual period?\"        no    Protocols used: Nosebleed-A-OH    "

## 2025-03-04 ENCOUNTER — MYC MEDICAL ADVICE (OUTPATIENT)
Dept: FAMILY MEDICINE | Facility: CLINIC | Age: 80
End: 2025-03-04
Payer: MEDICARE

## 2025-03-05 NOTE — TELEPHONE ENCOUNTER
Called Eva and relayed message from Dr Chaidez.  Eva understands and has no questions at this time.

## 2025-03-05 NOTE — CONFIDENTIAL NOTE
The patient sodium is stable.  It is low, not from a nutritional perspective but as a result of one of her medicines; hydrochlorothiazide.    I do not recommend electrolyte drink for this individual.  Please call and review with patient.  I sent her a [facility] message but it looks as though she does not consistently review MyChart.

## 2025-04-13 ENCOUNTER — MYC MEDICAL ADVICE (OUTPATIENT)
Dept: FAMILY MEDICINE | Facility: CLINIC | Age: 80
End: 2025-04-13
Payer: MEDICARE

## 2025-04-14 NOTE — TELEPHONE ENCOUNTER
Incoming call from patient, she has been experiencing sx of bladder prolapse for approximately 1 month. It does not cause her pain. She has noticed cloudy urine but denies burning or pain with urination, blood in urine, fever, low back pain, etc.    Appointment scheduled for 4/21 and patient encouraged to contact clinic sooner if she begins to experience other s/s of UTI as mentioned above. Encouraged patient to push fluids.

## 2025-04-14 NOTE — TELEPHONE ENCOUNTER
Left message to call back for: pt  Information to relay to patient: transfer to [facility] RN line to discuss symptoms.

## 2025-04-21 ENCOUNTER — OFFICE VISIT (OUTPATIENT)
Dept: FAMILY MEDICINE | Facility: CLINIC | Age: 80
End: 2025-04-21
Payer: COMMERCIAL

## 2025-04-21 VITALS
OXYGEN SATURATION: 97 % | HEIGHT: 63 IN | SYSTOLIC BLOOD PRESSURE: 143 MMHG | BODY MASS INDEX: 24.11 KG/M2 | RESPIRATION RATE: 16 BRPM | WEIGHT: 136.1 LBS | DIASTOLIC BLOOD PRESSURE: 70 MMHG | HEART RATE: 66 BPM | TEMPERATURE: 97.8 F

## 2025-04-21 DIAGNOSIS — N81.6 RECTOCELE: ICD-10-CM

## 2025-04-21 DIAGNOSIS — I10 BENIGN ESSENTIAL HYPERTENSION: ICD-10-CM

## 2025-04-21 DIAGNOSIS — R30.0 DYSURIA: Primary | ICD-10-CM

## 2025-04-21 PROCEDURE — 3078F DIAST BP <80 MM HG: CPT | Performed by: FAMILY MEDICINE

## 2025-04-21 PROCEDURE — 3077F SYST BP >= 140 MM HG: CPT | Performed by: FAMILY MEDICINE

## 2025-04-21 PROCEDURE — G2211 COMPLEX E/M VISIT ADD ON: HCPCS | Performed by: FAMILY MEDICINE

## 2025-04-21 PROCEDURE — 99214 OFFICE O/P EST MOD 30 MIN: CPT | Performed by: FAMILY MEDICINE

## 2025-04-21 RX ORDER — HYDROCHLOROTHIAZIDE 25 MG/1
25 TABLET ORAL DAILY
Qty: 90 TABLET | Refills: 3 | Status: SHIPPED | OUTPATIENT
Start: 2025-04-21

## 2025-04-21 RX ORDER — LISINOPRIL 40 MG/1
40 TABLET ORAL DAILY
Qty: 90 TABLET | Refills: 3 | Status: SHIPPED | OUTPATIENT
Start: 2025-04-21

## 2025-04-21 RX ORDER — HYDROCHLOROTHIAZIDE 25 MG/1
25 TABLET ORAL DAILY
Qty: 90 TABLET | Refills: 0 | Status: CANCELLED | OUTPATIENT
Start: 2025-04-21

## 2025-04-21 RX ORDER — LISINOPRIL 40 MG/1
40 TABLET ORAL DAILY
Qty: 90 TABLET | Refills: 0 | Status: SHIPPED | OUTPATIENT
Start: 2025-04-21 | End: 2025-04-21

## 2025-04-21 NOTE — ASSESSMENT & PLAN NOTE
Working diagnosis for today is rectocele (vs vaginal vault prolapse).  I suspect that she is experiencing a combination of mass effect from the rectocele on the bladder and pelvic floor dysfunction as result of her surgeries.  We will confirm that she does not currently have an infection.  She did have a infection that was treated back in January although at that time it was part of a broader workup and not for any specific urologic concerns.  Agree with referral to gynecology.

## 2025-04-21 NOTE — PROGRESS NOTES
"  Assessment & Plan   Problem List Items Addressed This Visit       Benign essential hypertension     BP okay today.  Refill sent to pharmacy.  Recent labs reviewed.           Relevant Medications    lisinopril (ZESTRIL) 40 MG tablet    hydrochlorothiazide (HYDRODIURIL) 25 MG tablet    Rectocele     Working diagnosis for today is rectocele (vs vaginal vault prolapse).  I suspect that she is experiencing a combination of mass effect from the rectocele on the bladder and pelvic floor dysfunction as result of her surgeries.  We will confirm that she does not currently have an infection.  She did have a infection that was treated back in January although at that time it was part of a broader workup and not for any specific urologic concerns.  Agree with referral to gynecology.          Other Visit Diagnoses       Dysuria    -  Primary    Relevant Orders    UA Macroscopic with reflex to Microscopic and Culture - Lab Collect           The longitudinal plan of care for the diagnosis(es)/condition(s) as documented were addressed during this visit. Due to the added complexity in care, I will continue to support Eva in the subsequent management and with ongoing continuity of care.    Subjective   Eva is a 79 year old, presenting for the following health issues:  Bladder prolapse and Urinary Frequency (Cloudy urine x 4 or 5 weeks ago)        4/21/2025    11:29 AM   Additional Questions   Roomed by xl     Bladder prolapse.   - urine has been cloudy,   - vaginal outlet mass.  \"I just shove it back up there.\"   - no pain   - leakiness has worsened.     History of Present Illness       Reason for visit:  Bladder  Symptom onset:  3-4 weeks ago  Symptom intensity:  Moderate  Symptom progression:  Staying the same  Had these symptoms before:  No  What makes it worse:  No  What makes it better:  Go to  the bathroom   She is taking medications regularly.              Objective    BP (!) 143/70 (BP Location: Left arm, Patient " "Position: Sitting, Cuff Size: Adult Regular)   Pulse 66   Temp 97.8  F (36.6  C) (Oral)   Resp 16   Ht 1.607 m (5' 3.25\")   Wt 61.7 kg (136 lb 1.6 oz)   LMP  (LMP Unknown)   SpO2 97%   BMI 23.92 kg/m    Body mass index is 23.92 kg/m .  Physical Exam   General: No acute distress  GYN: Labia majora and adjacent skin without abnormalities.  No specific lesions.  Posterior wall of the vaginal vault seems to be protruding through the introitus.  Nontender.  I am able to relocate it.          Signed Electronically by: Mor Chaidez MD    "

## 2025-05-04 ENCOUNTER — MYC MEDICAL ADVICE (OUTPATIENT)
Dept: FAMILY MEDICINE | Facility: CLINIC | Age: 80
End: 2025-05-04
Payer: MEDICARE

## 2025-05-04 DIAGNOSIS — I10 BENIGN ESSENTIAL HYPERTENSION: Primary | ICD-10-CM

## 2025-05-04 DIAGNOSIS — N81.6 RECTOCELE: Primary | ICD-10-CM

## 2025-05-05 NOTE — TELEPHONE ENCOUNTER
OV 4/21/25 noted:  Benign essential hypertension        BP okay today.  Refill sent to pharmacy.  Recent labs reviewed.             Relevant Medications     lisinopril (ZESTRIL) 40 MG tablet     hydrochlorothiazide (HYDRODIURIL) 25 MG tablet   See telephone encounter on 1/24/25

## 2025-05-06 ENCOUNTER — PATIENT OUTREACH (OUTPATIENT)
Dept: CARE COORDINATION | Facility: CLINIC | Age: 80
End: 2025-05-06
Payer: MEDICARE

## 2025-05-06 RX ORDER — LISINOPRIL 20 MG/1
20 TABLET ORAL DAILY
Qty: 90 TABLET | Refills: 3 | Status: SHIPPED | OUTPATIENT
Start: 2025-05-06

## 2025-05-06 NOTE — CONFIDENTIAL NOTE
The patient is correct that her blood pressure dose has fluctuated.  However, her most recent fill from the pharmacy was for the 40 mg/day dosing.  Her visit on 4/21 did not focus on blood pressure.  We had loaded a 40 mg dose of lisinopril at that time.    We should continue which ever dose she is currently taking.  It sounds like she is on 20 but please call and clarify with a phone call.

## 2025-05-06 NOTE — CONFIDENTIAL NOTE
My understanding is the patient had already self-referred to gynecology when I spoke with her on 4/21.  Obviously, that is not the case.  Referral placed today.

## 2025-05-06 NOTE — TELEPHONE ENCOUNTER
Spoke with patient. She confirms that she is taking 20mg of lisinopril daily. She is having difficulty cutting tablets in half and is requesting new prescription be sent to pharmacy.

## 2025-05-06 NOTE — TELEPHONE ENCOUNTER
Incoming call from patient requesting referral be sent to [facility] in [place]. Referral faxed to facility - confirmation received.

## 2025-05-08 ENCOUNTER — PATIENT OUTREACH (OUTPATIENT)
Dept: CARE COORDINATION | Facility: CLINIC | Age: 80
End: 2025-05-08
Payer: MEDICARE

## 2025-05-12 ENCOUNTER — TRANSFERRED RECORDS (OUTPATIENT)
Dept: HEALTH INFORMATION MANAGEMENT | Facility: CLINIC | Age: 80
End: 2025-05-12

## 2025-05-12 ENCOUNTER — LAB REQUISITION (OUTPATIENT)
Dept: LAB | Facility: CLINIC | Age: 80
End: 2025-05-12
Payer: MEDICARE

## 2025-05-12 DIAGNOSIS — R35.0 FREQUENCY OF MICTURITION: ICD-10-CM

## 2025-05-12 PROCEDURE — 87086 URINE CULTURE/COLONY COUNT: CPT | Mod: ORL | Performed by: OBSTETRICS & GYNECOLOGY

## 2025-05-13 LAB — BACTERIA UR CULT: NORMAL
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
## 2025-07-21 NOTE — ASSESSMENT & PLAN NOTE
Presents today for ER follow-up.  He was seen in the emergency room on 7/16/2025 following a fall.  She tripped over a bench and lost her balance hitting her left side of her head on the ground.  She is not on any blood thinners.  She is unsure if she lost consciousness.  She has some mild swelling over the left side of her forehead.  Following the fall she was able to get up and walk approximately 3 blocks to her home.  She underwent a CT of her head which showed no fracture or intracranial hemorrhage.  She also underwent CT of the spine which showed no fracture.    Overall, she is improving.  She has not had any visual changes or headaches.  No vomiting.  The bruising on her left side of face and forehead is improving.  She wears a good supportive shoe.  She is receiving physical therapy to help improve her balance.  She is not currently using a cane or walker but [place]  No additional testing is needed today.      CT head:  1.  Left frontal and lateral periorbital soft tissue swelling. No underlying calvarial fracture. No acute intracranial hemorrhage.

## 2025-07-22 ENCOUNTER — OFFICE VISIT (OUTPATIENT)
Dept: FAMILY MEDICINE | Facility: CLINIC | Age: 80
End: 2025-07-22
Payer: MEDICARE

## 2025-07-22 VITALS
WEIGHT: 128.8 LBS | HEART RATE: 72 BPM | HEIGHT: 63 IN | RESPIRATION RATE: 18 BRPM | OXYGEN SATURATION: 97 % | TEMPERATURE: 97.5 F | DIASTOLIC BLOOD PRESSURE: 66 MMHG | BODY MASS INDEX: 22.82 KG/M2 | SYSTOLIC BLOOD PRESSURE: 127 MMHG

## 2025-07-22 DIAGNOSIS — S09.90XD INJURY OF HEAD, SUBSEQUENT ENCOUNTER: ICD-10-CM
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
## 2025-07-22 PROCEDURE — 3074F SYST BP LT 130 MM HG: CPT | Performed by: PHYSICIAN ASSISTANT

## 2025-07-22 PROCEDURE — 3078F DIAST BP <80 MM HG: CPT | Performed by: PHYSICIAN ASSISTANT

## 2025-07-22 PROCEDURE — 99213 OFFICE O/P EST LOW 20 MIN: CPT | Performed by: PHYSICIAN ASSISTANT

## 2025-07-22 NOTE — PROGRESS NOTES
"  Assessment & Plan   Problem List Items Addressed This Visit       Fall - Primary    Presents today for ER follow-up.  He was seen in the emergency room on 7/16/2025 following a fall.  She tripped over a bench and lost her balance hitting her left side of her head on the ground.  She is not on any blood thinners.  She is unsure if she lost consciousness.  She has some mild swelling over the left side of her forehead.  Following the fall she was able to get up and walk approximately 3 blocks to her home.  She underwent a CT of her head which showed no fracture or intracranial hemorrhage.  She also underwent CT of the spine which showed no fracture.    Overall, she is improving.  She has not had any visual changes or headaches.  No vomiting.  The bruising on her left side of face and forehead is improving.  She wears a good supportive shoe.  She is receiving physical therapy to help improve her balance.  She is not currently using a cane or walker but does use walking sticks [place].  No additional testing is needed today.      CT head:  1.  Left frontal and lateral periorbital soft tissue swelling. No underlying calvarial fracture. No acute intracranial hemorrhage.           Other Visit Diagnoses         Injury of head, subsequent encounter               MED REC REQUIRED{    Subjective   Eva is a 79 year old, presenting for the following health issues:  Follow Up (ED 07-16)        7/22/2025     9:41 AM   Additional Questions   Roomed by Lilian Young         7/22/2025   Forms   Any forms needing to be completed Yes     HPI            Objective    /66 (BP Location: Left arm, Patient Position: Left side, Cuff Size: Adult Small)   Pulse 72   Temp 97.5  F (36.4  C)   Resp 18   Ht 1.6 m (5' 3\")   Wt 58.4 kg (128 lb 12.8 oz)   LMP  (LMP Unknown)   SpO2 97%   BMI 22.82 kg/m    Body mass index is 22.82 kg/m .  Physical Exam  Vitals and nursing note reviewed.   Constitutional:       " Appearance: Normal appearance.   HENT:      Head: Normocephalic.      Comments: Bruising which has faded to a purpleish green is noted on her forehead on the left side and left side of her face in the periorbital area.  Eyes:      Conjunctiva/sclera: Conjunctivae normal.      Pupils: Pupils are equal, round, and reactive to light.   Cardiovascular:      Rate and Rhythm: Normal rate and regular rhythm.      Heart sounds: Normal heart sounds.   Pulmonary:      Effort: Pulmonary effort is normal.      Breath sounds: Normal breath sounds.   Skin:     Comments: She has a large ecchymotic area on her left hip as well as a small ecchymotic area on the left lateral knee.   Neurological:      Mental Status: She is alert.   Psychiatric:         Mood and Affect: Mood normal.         Behavior: Behavior normal.         Thought Content: Thought content normal.         Judgment: Judgment normal.                Signed Electronically by: Camila Calles PA-C